# Patient Record
Sex: MALE | Race: WHITE | NOT HISPANIC OR LATINO | Employment: FULL TIME | URBAN - METROPOLITAN AREA
[De-identification: names, ages, dates, MRNs, and addresses within clinical notes are randomized per-mention and may not be internally consistent; named-entity substitution may affect disease eponyms.]

---

## 2023-08-10 ENCOUNTER — HOSPITAL ENCOUNTER (INPATIENT)
Facility: CLINIC | Age: 43
LOS: 14 days | Discharge: NURSING, REHAB OR LONG-TERM CARE | End: 2023-08-24
Attending: STUDENT IN AN ORGANIZED HEALTH CARE EDUCATION/TRAINING PROGRAM | Admitting: STUDENT IN AN ORGANIZED HEALTH CARE EDUCATION/TRAINING PROGRAM
Payer: COMMERCIAL

## 2023-08-10 DIAGNOSIS — K21.9 GASTROESOPHAGEAL REFLUX DISEASE WITHOUT ESOPHAGITIS: ICD-10-CM

## 2023-08-10 DIAGNOSIS — F51.01 PRIMARY INSOMNIA: ICD-10-CM

## 2023-08-10 DIAGNOSIS — S72.002A LEFT DISPLACED FEMORAL NECK FRACTURE (H): ICD-10-CM

## 2023-08-10 DIAGNOSIS — C40.20 OSTEOSARCOMA OF TIBIA, UNSPECIFIED LATERALITY (H): Primary | ICD-10-CM

## 2023-08-10 LAB
ABO/RH(D): NORMAL
ANION GAP SERPL CALCULATED.3IONS-SCNC: 9 MMOL/L (ref 7–15)
ANTIBODY SCREEN: NEGATIVE
BUN SERPL-MCNC: 21.3 MG/DL (ref 6–20)
CALCIUM SERPL-MCNC: 9.5 MG/DL (ref 8.6–10)
CHLORIDE SERPL-SCNC: 99 MMOL/L (ref 98–107)
CREAT SERPL-MCNC: 1.05 MG/DL (ref 0.67–1.17)
DEPRECATED HCO3 PLAS-SCNC: 30 MMOL/L (ref 22–29)
ERYTHROCYTE [DISTWIDTH] IN BLOOD BY AUTOMATED COUNT: 13.7 % (ref 10–15)
GFR SERPL CREATININE-BSD FRML MDRD: 89 ML/MIN/1.73M2
GLUCOSE SERPL-MCNC: 93 MG/DL (ref 70–99)
HCT VFR BLD AUTO: 43.9 % (ref 40–53)
HGB BLD-MCNC: 14.6 G/DL (ref 13.3–17.7)
INR PPP: 1.1 (ref 0.85–1.15)
MCH RBC QN AUTO: 28.6 PG (ref 26.5–33)
MCHC RBC AUTO-ENTMCNC: 33.3 G/DL (ref 31.5–36.5)
MCV RBC AUTO: 86 FL (ref 78–100)
PLATELET # BLD AUTO: 212 10E3/UL (ref 150–450)
POTASSIUM SERPL-SCNC: 4.1 MMOL/L (ref 3.4–5.3)
RBC # BLD AUTO: 5.1 10E6/UL (ref 4.4–5.9)
SODIUM SERPL-SCNC: 138 MMOL/L (ref 136–145)
SPECIMEN EXPIRATION DATE: NORMAL
WBC # BLD AUTO: 11.1 10E3/UL (ref 4–11)

## 2023-08-10 PROCEDURE — 36415 COLL VENOUS BLD VENIPUNCTURE: CPT | Performed by: STUDENT IN AN ORGANIZED HEALTH CARE EDUCATION/TRAINING PROGRAM

## 2023-08-10 PROCEDURE — 86850 RBC ANTIBODY SCREEN: CPT | Performed by: STUDENT IN AN ORGANIZED HEALTH CARE EDUCATION/TRAINING PROGRAM

## 2023-08-10 PROCEDURE — 120N000002 HC R&B MED SURG/OB UMMC

## 2023-08-10 PROCEDURE — 250N000011 HC RX IP 250 OP 636: Performed by: STUDENT IN AN ORGANIZED HEALTH CARE EDUCATION/TRAINING PROGRAM

## 2023-08-10 PROCEDURE — 82310 ASSAY OF CALCIUM: CPT | Performed by: STUDENT IN AN ORGANIZED HEALTH CARE EDUCATION/TRAINING PROGRAM

## 2023-08-10 PROCEDURE — 99254 IP/OBS CNSLTJ NEW/EST MOD 60: CPT | Performed by: PHYSICIAN ASSISTANT

## 2023-08-10 PROCEDURE — 85027 COMPLETE CBC AUTOMATED: CPT | Performed by: STUDENT IN AN ORGANIZED HEALTH CARE EDUCATION/TRAINING PROGRAM

## 2023-08-10 PROCEDURE — 85610 PROTHROMBIN TIME: CPT | Performed by: STUDENT IN AN ORGANIZED HEALTH CARE EDUCATION/TRAINING PROGRAM

## 2023-08-10 PROCEDURE — 250N000013 HC RX MED GY IP 250 OP 250 PS 637: Performed by: STUDENT IN AN ORGANIZED HEALTH CARE EDUCATION/TRAINING PROGRAM

## 2023-08-10 PROCEDURE — 86901 BLOOD TYPING SEROLOGIC RH(D): CPT | Performed by: STUDENT IN AN ORGANIZED HEALTH CARE EDUCATION/TRAINING PROGRAM

## 2023-08-10 RX ORDER — FENTANYL 50 UG/1
50 PATCH TRANSDERMAL
Status: DISCONTINUED | OUTPATIENT
Start: 2023-08-09 | End: 2023-08-24 | Stop reason: HOSPADM

## 2023-08-10 RX ORDER — PANTOPRAZOLE SODIUM 40 MG/1
40 TABLET, DELAYED RELEASE ORAL DAILY
Status: DISCONTINUED | OUTPATIENT
Start: 2023-08-11 | End: 2023-08-24 | Stop reason: HOSPADM

## 2023-08-10 RX ORDER — ACETAMINOPHEN 325 MG/1
650 TABLET ORAL EVERY 4 HOURS PRN
Status: DISCONTINUED | OUTPATIENT
Start: 2023-08-13 | End: 2023-08-24 | Stop reason: HOSPADM

## 2023-08-10 RX ORDER — LORATADINE 10 MG/1
10 TABLET ORAL DAILY PRN
COMMUNITY

## 2023-08-10 RX ORDER — ONDANSETRON 2 MG/ML
4 INJECTION INTRAMUSCULAR; INTRAVENOUS EVERY 6 HOURS PRN
Status: DISCONTINUED | OUTPATIENT
Start: 2023-08-10 | End: 2023-08-24 | Stop reason: HOSPADM

## 2023-08-10 RX ORDER — METHOCARBAMOL 750 MG/1
750 TABLET, FILM COATED ORAL EVERY 6 HOURS PRN
Status: DISCONTINUED | OUTPATIENT
Start: 2023-08-10 | End: 2023-08-24 | Stop reason: HOSPADM

## 2023-08-10 RX ORDER — OXYCODONE AND ACETAMINOPHEN 10; 325 MG/1; MG/1
1 TABLET ORAL EVERY 4 HOURS PRN
Status: ON HOLD | COMMUNITY
End: 2023-08-24

## 2023-08-10 RX ORDER — FENTANYL 50 UG/1
1 PATCH TRANSDERMAL
Status: ON HOLD | COMMUNITY
End: 2023-08-24

## 2023-08-10 RX ORDER — NALOXONE HYDROCHLORIDE 0.4 MG/ML
0.4 INJECTION, SOLUTION INTRAMUSCULAR; INTRAVENOUS; SUBCUTANEOUS
Status: DISCONTINUED | OUTPATIENT
Start: 2023-08-10 | End: 2023-08-24 | Stop reason: HOSPADM

## 2023-08-10 RX ORDER — NALOXONE HYDROCHLORIDE 0.4 MG/ML
0.2 INJECTION, SOLUTION INTRAMUSCULAR; INTRAVENOUS; SUBCUTANEOUS
Status: DISCONTINUED | OUTPATIENT
Start: 2023-08-10 | End: 2023-08-24 | Stop reason: HOSPADM

## 2023-08-10 RX ORDER — DEXAMETHASONE 4 MG/1
8 TABLET ORAL
Status: ON HOLD | COMMUNITY
End: 2023-08-24

## 2023-08-10 RX ORDER — LIDOCAINE 40 MG/G
CREAM TOPICAL
Status: DISCONTINUED | OUTPATIENT
Start: 2023-08-10 | End: 2023-08-24 | Stop reason: HOSPADM

## 2023-08-10 RX ORDER — AMOXICILLIN 250 MG
1 CAPSULE ORAL 2 TIMES DAILY
Status: DISCONTINUED | OUTPATIENT
Start: 2023-08-10 | End: 2023-08-24 | Stop reason: HOSPADM

## 2023-08-10 RX ORDER — OXYCODONE HYDROCHLORIDE 10 MG/1
10 TABLET ORAL EVERY 4 HOURS PRN
Status: DISCONTINUED | OUTPATIENT
Start: 2023-08-10 | End: 2023-08-24 | Stop reason: HOSPADM

## 2023-08-10 RX ORDER — ONDANSETRON 4 MG/1
4 TABLET, ORALLY DISINTEGRATING ORAL EVERY 6 HOURS PRN
Status: DISCONTINUED | OUTPATIENT
Start: 2023-08-10 | End: 2023-08-24 | Stop reason: HOSPADM

## 2023-08-10 RX ORDER — PROCHLORPERAZINE MALEATE 5 MG
10 TABLET ORAL EVERY 6 HOURS PRN
Status: DISCONTINUED | OUTPATIENT
Start: 2023-08-10 | End: 2023-08-24 | Stop reason: HOSPADM

## 2023-08-10 RX ORDER — POLYETHYLENE GLYCOL 3350 17 G/17G
17 POWDER, FOR SOLUTION ORAL DAILY
Status: DISCONTINUED | OUTPATIENT
Start: 2023-08-11 | End: 2023-08-24 | Stop reason: HOSPADM

## 2023-08-10 RX ORDER — BISACODYL 10 MG
10 SUPPOSITORY, RECTAL RECTAL DAILY PRN
Status: DISCONTINUED | OUTPATIENT
Start: 2023-08-10 | End: 2023-08-24 | Stop reason: HOSPADM

## 2023-08-10 RX ORDER — CALCIUM CARBONATE 500 MG/1
500 TABLET, CHEWABLE ORAL 4 TIMES DAILY PRN
Status: DISCONTINUED | OUTPATIENT
Start: 2023-08-10 | End: 2023-08-24 | Stop reason: HOSPADM

## 2023-08-10 RX ORDER — ACETAMINOPHEN 325 MG/1
325-650 TABLET ORAL EVERY 6 HOURS PRN
Status: ON HOLD | COMMUNITY
End: 2023-08-24

## 2023-08-10 RX ORDER — ACETAMINOPHEN 325 MG/1
975 TABLET ORAL EVERY 8 HOURS
Status: COMPLETED | OUTPATIENT
Start: 2023-08-10 | End: 2023-08-13

## 2023-08-10 RX ORDER — HYDROMORPHONE HYDROCHLORIDE 1 MG/ML
0.5 INJECTION, SOLUTION INTRAMUSCULAR; INTRAVENOUS; SUBCUTANEOUS
Status: DISCONTINUED | OUTPATIENT
Start: 2023-08-10 | End: 2023-08-16

## 2023-08-10 RX ORDER — HYDROMORPHONE HCL IN WATER/PF 6 MG/30 ML
0.2 PATIENT CONTROLLED ANALGESIA SYRINGE INTRAVENOUS
Status: DISCONTINUED | OUTPATIENT
Start: 2023-08-10 | End: 2023-08-16

## 2023-08-10 RX ADMIN — ACETAMINOPHEN 975 MG: 325 TABLET, FILM COATED ORAL at 20:01

## 2023-08-10 RX ADMIN — HYDROMORPHONE HYDROCHLORIDE 0.5 MG: 1 INJECTION, SOLUTION INTRAMUSCULAR; INTRAVENOUS; SUBCUTANEOUS at 20:00

## 2023-08-10 RX ADMIN — SENNOSIDES AND DOCUSATE SODIUM 1 TABLET: 8.6; 5 TABLET ORAL at 20:14

## 2023-08-10 RX ADMIN — METHOCARBAMOL 750 MG: 750 TABLET ORAL at 20:02

## 2023-08-10 ASSESSMENT — ACTIVITIES OF DAILY LIVING (ADL)
ADLS_ACUITY_SCORE: 35

## 2023-08-10 NOTE — PROGRESS NOTES
Transfer Type: [facility]  Transfer Triage Note    Patient is being admitted to [facility] under Ortho Primary     Date of call: 08/09/23  Time of call: 8:46 PM    Current Patient Location:  [facility] ED  Current Level of Care: Med Surg    Vitals: afebrile and HDS  Diagnosis: Pathologic Femoral Neck fracture  Is COVID-19 a concern? No  Reason for requested transfer: Procedure can be done here and not at referring hospital   Isolation Needs: None    Outside Records: Not available  Additional records may be faxed to [phone].    Transfer accepted: Yes  Stability of Patient: Patient is vitally stable, with no critical labs, and will likely remain stable throughout the transfer process  Level of Care Needed: Med Surg  Telemetry Needed:  None  Expected Time of Arrival for Transfer: 8-24 hours  Arrival Location:  [facility]    Recommendations for Management and Stabilization: Not needed    Additional Comments: Patient with rectal cancer, brain cancer who is in ED for pathologic framoralp neck fracture. Ortho would like the patient to come to [facility] [place] [place] under their care.     Shubham Mclaughlin MD

## 2023-08-10 NOTE — PHARMACY-ADMISSION MEDICATION HISTORY
Pharmacist Admission Medication History    Admission medication history is complete. The information provided in this note is only as accurate as the sources available at the time of the update.    Medication reconciliation/reorder completed by provider prior to medication history? No    Information Source(s): Patient and CareEverywhere/SureScripts via in-person    Pertinent Information:   Spoke with patient and utilized chart review to update patient's medication list.   Fentanyl patch - prescribed 50 mcg transdermal patch every 72 hours - patient states that he placed one on yesterday (is currently wearing).  Tramadol - patient did not mention this when asked if he takes any additional prescription medications to the medications listed below, however, appears there was a recent fill for this. Upon further chart review, patient indicates that tramadol was not working for pain management. Did not add this to prior to admission medication list.    Changes made to PTA medication list:  Added: All medications below were added   Deleted: None  Changed: None    Allergies reviewed with patient and updates made in EHR: no    Medication History Completed By: Taras Rivas RPH 8/10/2023 4:27 PM    Prior to Admission medications    Medication Sig Last Dose Taking? Auth Provider Long Term End Date   acetaminophen (TYLENOL) 325 MG tablet Take 325-650 mg by mouth every 6 hours as needed for mild pain PRN at PRN Yes Unknown, Entered By History     dexAMETHasone (DECADRON) 4 MG tablet Take 8 mg by mouth daily (with breakfast) 8/9/2023 at 0800 Yes Unknown, Entered By History Yes    fentaNYL (DURAGESIC) 50 mcg/hr 72 hr patch Place 1 patch onto the skin every 72 hours remove old patch. 8/9/2023 at 0800 Yes Unknown, Entered By History     loratadine (CLARITIN) 10 MG tablet Take 10 mg by mouth daily as needed for allergies PRN at PRN Yes Unknown, Entered By History     omeprazole (PRILOSEC) 20 MG DR capsule Take 20 mg by mouth daily  8/9/2023 at 0800 Yes Unknown, Entered By History     oxyCODONE-acetaminophen (PERCOCET)  MG per tablet Take 1 tablet by mouth every 4 hours as needed for severe pain 8/9/2023 at 2000 Yes Unknown, Entered By History

## 2023-08-10 NOTE — PROVIDER NOTIFICATION
Also paged orthopedics, [place], regarding lack of orders at this time. Pt requesting stool softeners and pain medications.

## 2023-08-10 NOTE — CONSULTS
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  He was accepted for transfer by Dr. Harley last night from [place].    He notes that he is had a left sided hip pain starting from April.  He was initially seen by a general orthopedist and diagnosed with trochanteric bursitis, prescribed steroids.  When this did not improve, he was referred for an MRI of the left hip that showed very likely metastasis.  He was referred to orthopedic oncologist Dr. Sr at [place], has received that CT and subsequent needle biopsy of the left ilium.  They recommended that he maintain nonweightbearing left lower extremity.    His treatment team includes:  Medical oncologist (Kee Palma)  Orthopedic oncologist (Nikhil Sr), [facility] orthopedics provider (Ludin Ibarra PA)    His vitals are stable, on exam he is neurovascular intact distally in the bilateral lower extremities.  There is no obvious bruising over his left hip area.  He notes some tenderness on the lateral aspect that feels deep.    Overall, this is a complicated fracture pattern with underlying tumor. We are in discussions with our treatment team to find the best surgical plan for this individual case.     RECOMMENDATIONS:   -  Admit to: Ortho, appreciate medical comanagement  - Plan for OR: Possibly tomorrow 8/11/2023, TBD   -Consent: TBD   -Pre-op labs: CBC, BMP, INR, type and screen   -Medicine clearance: Appreciated  - Anticoagulation/DVT Prophylaxis: Not currently taking, hold for OR  - Antibiotics/Tetanus: Ancef perioperatively  - X-rays/Imaging: Tried to have all images pushed from [facility], only received CT scan, plain films pending  - Bracing/Splinting: None  - Elevation: None  - Activity: Bedbound  - Weight bearing: Nonweightbearing left lower extremity  - Pain control: Use all oral meds first then escalate to IV, as ordered  - Diet: NPO at midnight for OR possible OR on 8/11/2023  - Cultures: None  - Consults Appreciated: Medicine  - Disposition: TBD  - Follow-up: TBD    HISTORY OF PRESENT ILLNESS:   Sarbjit Swain is a 43 year old male with PMH significant for medulloblastoma at age 4 (treated here at [facility]), BRCA1 genotype, rectal cancer diagnosed around 2 years ago (treated with hemicolectomy and Capecitabine chemotherapy, which was discontinued due to a chest tightness reaction) and new lesion 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  He was accepted for transfer by Dr. Harley last night from [place].    He notes that he is had a left sided hip pain starting from April.  He first went to a chiropractor, and when that did not provide much relief, he was seen by a general orthopedist PA and diagnosed with trochanteric bursitis, prescribed steroids.  When this did not improve, he was referred for an MRI of the left hip that showed very likely metastasis.  He was referred to orthopedic oncologist Dr. Sr at [place], has received that CT and subsequent needle biopsy of the left ilium.  They recommended that he maintain nonweightbearing left lower  extremity.    Given that he lives in a split-level home, this was difficult for him to maintain.  He had used a wheelchair that his wife ordered online.  However in order to get around, he was trying to crawl around on the ground when this pathologic fracture occurred.    He is under the impression that he needs to have his hip replaced, and is understanding that we need to take a team approach to find the best treatment approach for his specific condition. Pain is controlled.    Denies numbness, tingling, or weakness to the affected extremities.  Denies fevers, chills, nausea, vomiting, diarrhea, constipation, chest pain, shortness of breath.    PAST MEDICAL HISTORY:  No past medical history on file.  [Patient denies any personal history of bleeding disorders, clotting disorders, or adverse reactions to anesthesia].    PAST SURGICAL HISTORY:   No past surgical history on file.    MEDICATIONS:   Prior to Admission medications    Medication Sig Last Dose Taking? Auth Provider Long Term End Date   acetaminophen (TYLENOL) 325 MG tablet Take 325-650 mg by mouth every 6 hours as needed for mild pain PRN at PRN Yes Unknown, Entered By History     dexAMETHasone (DECADRON) 4 MG tablet Take 8 mg by mouth daily (with breakfast) 8/9/2023 at 0800 Yes Unknown, Entered By History Yes    fentaNYL (DURAGESIC) 50 mcg/hr 72 hr patch Place 1 patch onto the skin every 72 hours remove old patch. 8/9/2023 at 0800 Yes Unknown, Entered By History     loratadine (CLARITIN) 10 MG tablet Take 10 mg by mouth daily as needed for allergies PRN at PRN Yes Unknown, Entered By History     omeprazole (PRILOSEC) 20 MG DR capsule Take 20 mg by mouth daily 8/9/2023 at 0800 Yes Unknown, Entered By History     oxyCODONE-acetaminophen (PERCOCET)  MG per tablet Take 1 tablet by mouth every 4 hours as needed for severe pain 8/9/2023 at 2000 Yes Unknown, Entered By History       ALLERGIES:   Believes he has an allergy to Capecitabine where he felt his  whole chest tighten  Patient has no known allergies.    SOCIAL HISTORY:   Social History     Socioeconomic History    Marital status: Not on file     Spouse name: Not on file    Number of children: Not on file    Years of education: Not on file    Highest education level: Not on file   Occupational History    Not on file   Tobacco Use    Smoking status: Not on file    Smokeless tobacco: Not on file   Substance and Sexual Activity    Alcohol use: Not on file    Drug use: Not on file    Sexual activity: Not on file   Other Topics Concern    Not on file   Social History Narrative    Not on file     Social Determinants of Health     Financial Resource Strain: Not on file   Food Insecurity: Not on file   Transportation Needs: Not on file   Physical Activity: Not on file   Stress: Not on file   Social Connections: Not on file   Intimate Partner Violence: Not on file   Housing Stability: Not on file     Living situation: Patient lives in a 3 level split house in [place], [place], which is near [place].  Occupation: Unable to work currently, was daytime  at local school  Tobacco: Never smoker  Alcohol: Rare  Illicit Drugs: None    FAMILY HISTORY:  No family history on file.    Patient denies known family history of bleeding, clotting, or anesthesia related complications.     REVIEW OF SYSTEMS:   Otherwise a 10-point reviews of systems was negative except as noted above in the HPI.     PHYSICAL EXAM:   Vitals:    08/10/23 1536   BP: 120/76   BP Location: Left arm   Pulse: 103   Resp: 16   Temp: 98.1  F (36.7  C)   TempSrc: Oral   SpO2: 95%     General: Awake, alert, appropriate, following commands, NAD.  Lungs: Breathing comfortably and nonlabored, no wheezes or stridor noted.  Nasal cannula is in place when examined  Heart/Cardiovascular: Regular pulse, no peripheral cyanosis.  Right Lower Extremity: No deformity, skin intact. No significant tenderness to palpation over thigh, knee, leg, ankle/foot. No pain with  "ROM hip/knee/ankle. Motor intact distally TA/GSC/EHL/FHL with 5/5 strength. SILT sp/dp/tibial/saph/sural nerves. DP/PT pulses palpable, 2+, toes warm and well perfused.   Left Lower Extremity: No deformity, skin intact.  Tender to palpation over the lateral hip, patient pressed on himself to find the tender area.  No significant tenderness to palpation over knee, leg, ankle/foot. Motor intact distally TA/GSC/EHL/FHL with 5/5 strength. SILT sp/dp/tibial/saph/sural nerves. DP/PT pulses palpable, 2+, toes warm and well perfused.     LABS:  No lab results found in last 7 days.    Invalid input(s): SEDRATE  No results found for: INR    No results found for: CR  No results found for: GLC    No results found for: SED  No results found for: CRPI       7-Day Micro Results       No results found for the last 168 hours.           IMAGING:  Imaging reports are visible in epic for a PET CT scan, MRI left hip, MRI brain, CT needle biopsy, CT left hip.  Discussed with [facility] to send all records to [facility].  Unfortunately, only CT hip is viewable at this time.  We have reordered plain films here.  Will continue to work on obtaining full imaging during the day.    CT of the left hip independently reviewed showing subcapital femoral neck fracture with acetabular fracture and soft tissue prominence.  Recent Results (from the past 48 hour(s))   CT HIP LEFT WO IV CONTRAST    Narrative    This document is currently in Final Status    Exam Accession# [id number]    CT SCAN OF THE LEFT HIP 08/09/2023 1454    INDICATION: \"Fracture, hip\"    TECHNIQUE: MDCT with MPR.    FINDINGS: There is a pathologic subcapital fracture of the left femur with some anterior angulation at the fracture along with slight superior displacement of the lateral fracture fragment. Permeative destructive change involves the femoral head and neck and also involves the acetabular side of the joint especially the left lateral superior pubic ramus and medial " acetabulum. Soft tissue prominence is also seen in this area.    IMPRESSION: Pathologic subcapital fracture of the left femur likely due to metastatic disease. Acetabular involvement is also noted.     Dictated By: Eyal Kim MD 8/9/2023 3:26 PM  Edited By: LEDA 8/9/2023 3:32 PM    Electronically Signed: Eyal Kim MD 8/9/2023 5:01 PM     --  Avila Daniel MD, PhD  Orthopedic Surgery PGY-1  [phone]     I signed evaluated this patient on 8/14/2023 and reviewed his imaging showing large tumor involving the hip joint and causing pathologic femoral neck fracture.  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  I counseled with the patient and family about the best next test and talk with medical oncology service.  Plan will be to do a limited femoral neck fixation of the patient may need a hemipelvectomy or other major surgery to status local control of the pelvis tumor in addition to chemotherapy.  Hopefully this measurable for him pain control he undergoes neoadjuvant chemotherapy.    Dann Avila MD

## 2023-08-11 ENCOUNTER — APPOINTMENT (OUTPATIENT)
Dept: GENERAL RADIOLOGY | Facility: CLINIC | Age: 43
End: 2023-08-11
Attending: STUDENT IN AN ORGANIZED HEALTH CARE EDUCATION/TRAINING PROGRAM
Payer: COMMERCIAL

## 2023-08-11 DIAGNOSIS — M84.454S PATHOLOGICAL FRACTURE, PELVIS, SEQUELA: Primary | ICD-10-CM

## 2023-08-11 LAB
ALBUMIN SERPL BCG-MCNC: 3.6 G/DL (ref 3.5–5.2)
ALP SERPL-CCNC: 573 U/L (ref 40–129)
ALT SERPL W P-5'-P-CCNC: 18 U/L (ref 0–70)
ANION GAP SERPL CALCULATED.3IONS-SCNC: 11 MMOL/L (ref 7–15)
AST SERPL W P-5'-P-CCNC: 65 U/L (ref 0–45)
BILIRUB SERPL-MCNC: 1.5 MG/DL
BUN SERPL-MCNC: 19.5 MG/DL (ref 6–20)
CALCIUM SERPL-MCNC: 9.7 MG/DL (ref 8.6–10)
CHLORIDE SERPL-SCNC: 99 MMOL/L (ref 98–107)
CREAT SERPL-MCNC: 1 MG/DL (ref 0.67–1.17)
DEPRECATED HCO3 PLAS-SCNC: 25 MMOL/L (ref 22–29)
ERYTHROCYTE [DISTWIDTH] IN BLOOD BY AUTOMATED COUNT: 13.7 % (ref 10–15)
GFR SERPL CREATININE-BSD FRML MDRD: >90 ML/MIN/1.73M2
GLUCOSE SERPL-MCNC: 105 MG/DL (ref 70–99)
HCT VFR BLD AUTO: 42.6 % (ref 40–53)
HGB BLD-MCNC: 14.6 G/DL (ref 13.3–17.7)
MCH RBC QN AUTO: 29.4 PG (ref 26.5–33)
MCHC RBC AUTO-ENTMCNC: 34.3 G/DL (ref 31.5–36.5)
MCV RBC AUTO: 86 FL (ref 78–100)
PLATELET # BLD AUTO: 228 10E3/UL (ref 150–450)
POTASSIUM SERPL-SCNC: 4 MMOL/L (ref 3.4–5.3)
PROT SERPL-MCNC: 6.8 G/DL (ref 6.4–8.3)
RBC # BLD AUTO: 4.97 10E6/UL (ref 4.4–5.9)
SODIUM SERPL-SCNC: 135 MMOL/L (ref 136–145)
WBC # BLD AUTO: 14.1 10E3/UL (ref 4–11)

## 2023-08-11 PROCEDURE — 85027 COMPLETE CBC AUTOMATED: CPT | Performed by: PHYSICIAN ASSISTANT

## 2023-08-11 PROCEDURE — 250N000013 HC RX MED GY IP 250 OP 250 PS 637: Performed by: STUDENT IN AN ORGANIZED HEALTH CARE EDUCATION/TRAINING PROGRAM

## 2023-08-11 PROCEDURE — 80053 COMPREHEN METABOLIC PANEL: CPT | Performed by: PHYSICIAN ASSISTANT

## 2023-08-11 PROCEDURE — 250N000013 HC RX MED GY IP 250 OP 250 PS 637: Performed by: PHYSICIAN ASSISTANT

## 2023-08-11 PROCEDURE — 73502 X-RAY EXAM HIP UNI 2-3 VIEWS: CPT

## 2023-08-11 PROCEDURE — 250N000011 HC RX IP 250 OP 636: Performed by: STUDENT IN AN ORGANIZED HEALTH CARE EDUCATION/TRAINING PROGRAM

## 2023-08-11 PROCEDURE — 99232 SBSQ HOSP IP/OBS MODERATE 35: CPT | Performed by: INTERNAL MEDICINE

## 2023-08-11 PROCEDURE — 36415 COLL VENOUS BLD VENIPUNCTURE: CPT | Performed by: PHYSICIAN ASSISTANT

## 2023-08-11 PROCEDURE — 120N000002 HC R&B MED SURG/OB UMMC

## 2023-08-11 PROCEDURE — 250N000011 HC RX IP 250 OP 636: Mod: JZ

## 2023-08-11 RX ORDER — ENOXAPARIN SODIUM 100 MG/ML
40 INJECTION SUBCUTANEOUS EVERY 24 HOURS
Status: DISCONTINUED | OUTPATIENT
Start: 2023-08-11 | End: 2023-08-24 | Stop reason: HOSPADM

## 2023-08-11 RX ADMIN — MAGNESIUM HYDROXIDE 30 ML: 400 SUSPENSION ORAL at 12:55

## 2023-08-11 RX ADMIN — METHOCARBAMOL 750 MG: 750 TABLET ORAL at 12:16

## 2023-08-11 RX ADMIN — PANTOPRAZOLE SODIUM 40 MG: 40 TABLET, DELAYED RELEASE ORAL at 08:45

## 2023-08-11 RX ADMIN — ACETAMINOPHEN 975 MG: 325 TABLET, FILM COATED ORAL at 04:09

## 2023-08-11 RX ADMIN — HYDROMORPHONE HYDROCHLORIDE 0.5 MG: 1 INJECTION, SOLUTION INTRAMUSCULAR; INTRAVENOUS; SUBCUTANEOUS at 19:52

## 2023-08-11 RX ADMIN — ENOXAPARIN SODIUM 40 MG: 40 INJECTION SUBCUTANEOUS at 14:02

## 2023-08-11 RX ADMIN — HYDROMORPHONE HYDROCHLORIDE 0.5 MG: 1 INJECTION, SOLUTION INTRAMUSCULAR; INTRAVENOUS; SUBCUTANEOUS at 17:07

## 2023-08-11 RX ADMIN — ACETAMINOPHEN 975 MG: 325 TABLET, FILM COATED ORAL at 12:16

## 2023-08-11 RX ADMIN — SENNOSIDES AND DOCUSATE SODIUM 1 TABLET: 8.6; 5 TABLET ORAL at 08:45

## 2023-08-11 RX ADMIN — OXYCODONE HYDROCHLORIDE 15 MG: 10 TABLET ORAL at 01:01

## 2023-08-11 RX ADMIN — OXYCODONE HYDROCHLORIDE 15 MG: 10 TABLET ORAL at 22:17

## 2023-08-11 RX ADMIN — BISACODYL 10 MG: 10 SUPPOSITORY RECTAL at 17:29

## 2023-08-11 RX ADMIN — SENNOSIDES AND DOCUSATE SODIUM 1 TABLET: 8.6; 5 TABLET ORAL at 19:52

## 2023-08-11 RX ADMIN — ACETAMINOPHEN 975 MG: 325 TABLET, FILM COATED ORAL at 19:52

## 2023-08-11 RX ADMIN — OXYCODONE HYDROCHLORIDE 15 MG: 10 TABLET ORAL at 17:07

## 2023-08-11 RX ADMIN — OXYCODONE HYDROCHLORIDE 15 MG: 10 TABLET ORAL at 12:55

## 2023-08-11 RX ADMIN — HYDROMORPHONE HYDROCHLORIDE 0.5 MG: 1 INJECTION, SOLUTION INTRAMUSCULAR; INTRAVENOUS; SUBCUTANEOUS at 14:48

## 2023-08-11 RX ADMIN — OXYCODONE HYDROCHLORIDE 15 MG: 10 TABLET ORAL at 08:45

## 2023-08-11 RX ADMIN — HYDROMORPHONE HYDROCHLORIDE 0.5 MG: 1 INJECTION, SOLUTION INTRAMUSCULAR; INTRAVENOUS; SUBCUTANEOUS at 09:36

## 2023-08-11 ASSESSMENT — ACTIVITIES OF DAILY LIVING (ADL)
ADLS_ACUITY_SCORE: 35
ADLS_ACUITY_SCORE: 43
ADLS_ACUITY_SCORE: 35
ADLS_ACUITY_SCORE: 43
ADLS_ACUITY_SCORE: 35

## 2023-08-11 NOTE — PROGRESS NOTES
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    Interval Events:  -NAEON, AFVSS  -Still noting pain, but better than last night, please give PRNs    Subjective:  Pain 7/10 but better than last night. Denies any new numbness or tingling. IV/Oral meds. Has been in bed.    Objective:  Temp: 98.5  F (36.9  C) Temp src: Oral BP: 112/66 Pulse: 114   Resp: 16 SpO2: 95 % O2 Device: None (Room air)      Exam:  Gen: No acute distress, resting comfortably in bed. Alert and oriented, responds to questions appropriately.  Resp: Non-labored on RA.    MSK:  Left Lower Extremity: No deformity, skin intact. Compartments soft. No significant tenderness to palpation over knee, leg, ankle/foot. Motor intact distally TA/GSC/EHL/FHL with 5/5 strength. SILT sp/dp/tibial/saph/sural nerves. DP/PT pulses palpable, 2+, toes warm and well perfused.     Recent Labs   Lab 08/11/23  0702 08/10/23  1903   WBC 14.1* 11.1*   HGB 14.6 14.6    212     No results found for: SED  No results found for: CRPI   7-Day Micro Results       No results found for the last 168 hours.          Recent Results (from the past 24 hour(s))   XR Hip Left 2-3 Views    Narrative    EXAM: XR HIP LEFT 2-3 VIEWS  LOCATION: [facility]  DATE: 8/11/2023    INDICATION: Fall, femoral neck fracture on CT scan.  COMPARISON: CT pelvis 08/09/2023 and MRI left hip 06/30/2023.      Impression    IMPRESSION: Redemonstration of a  mildly displaced left transcervical femoral neck fracture with proximal displacement of the distal femur. Ill-defined lucency involving the medial acetabular wall and the acetabular roof with obliteration of the   iliopectineal line, the pubic root, and the superior pubic ramus. This corresponds with an expansile lytic soft tissue process within the acetabulum and pubis on the prior CT, and the confluent T1 hypointense marrow replacing process demonstrated on the   prior MRI of 06/30/2023.     Mild degenerative arthritis both hips. Radiopaque suture material overlies the low pelvis.      Assessment: Sarbjit Swain is a 43 year old male with PMH significant for medulloblastoma at age 4 (treated here at [facility]), BRCA1 genotype, rectal cancer diagnosed around 2 years ago with known metastatic lesion who sustained a left pathologic femoral neck fracture and acetabulum fracture on 8/9/2023 while he was crawling at his home. Pending OR with Dr. Chand next week. Given that medulloblastoma is not expected to metastasize and that rectal cancer is not necessarily spindle celled, we will need a pathology review prior to OR.    Plan:  - Plan for OR: With Dr. Chand next week. Requires pathology review of biopsy prior to surgery.              -Consent: TBD              -Pre-op labs: CBC, BMP, INR, type and screen              -Medicine clearance: Appreciated  - Anticoagulation/DVT Prophylaxis: Lovenox q24hrs, hold for OR  - Antibiotics/Tetanus: Ancef perioperatively  - X-rays/Imaging: CT, X-rays, complete  - Bracing/Splinting: None  - Elevation: None  - Activity: Bedbound  - Weight bearing: Nonweightbearing left lower extremity  - Pain control: Use all oral meds first then escalate to IV, as ordered  - Diet: Regular diet over weekend  - Cultures: None  - Consults Appreciated: Medicine, Pathology  - Disposition: Pending OR  - Follow-up: TBD    Orthopedic surgery staff for this patient is   [place].    --  Avila Daniel MD, PhD  Orthopedic Surgery PGY-1  [phone]    Please page me directly with any questions/concerns during regular weekday hours before 5pm. If there is no response, if it is a weekend, or if it is during evening hours, then please page the orthopedic surgery resident on call.    Future Appointments   Date Time Provider Department [place]   8/11/2023  7:00 PM UR PT WAITLIST [facility] [place]   8/11/2023  7:00 PM Yoly Navas, OTR [facility] [place]   8/12/2023  8:15 AM UR PT WAITLIST [facility] [place]

## 2023-08-11 NOTE — PLAN OF CARE
Goal Outcome Evaluation:         VS: Temp: 98.1  F (36.7  C) Temp src: Oral BP: 120/76 Pulse: 103   Resp: 16 SpO2: 95 % O2 Device: None (Room air)      O2: RA   Output: 200   Last BM: States a couple days ago. Requested stool softeners, provider notified.   Activity: Bedrest   Skin: Scattered bruising   Pain: Moderate to high, received dilaudid x2   Neuro: A&O x4   Dressing: NA   Diet: Regular, NPO at midnight   LDA: PIV L    Equipment:    Plan: Surgery likely tomorrow   Additional Info: Pt arrived at 1500. Fell, witnessed by spouse- see prior notes.

## 2023-08-11 NOTE — PLAN OF CARE
Goal Outcome Evaluation:      Plan of Care Reviewed With: patient    VS: VSS   O2: SpO2 > 90% and stable on RA. LS clear and equal bilaterally. Denies chest pain and SOB.    Output: Voids using beside urinal   Last BM: States a couple days ago, denies abdominal discomfort.     Activity: Bedrest   Skin: WDL except, scattered bruising    Pain: Pain managed with Oxycodone, scheduled tylenol, IV dilaudid available    CMS: AOx4. Denies numbness and tingling.   Dressing: None    Diet: NPO   LDA: L PIV SL    Equipment: IV pole, personal belongings,    Plan: Continue with plan of care. Call light within reach, pt able to make needs known.    Additional Info: L hip surgery likely today?

## 2023-08-11 NOTE — PROVIDER NOTIFICATION
Paged orthopedics regarding witnessed fall without injury. Pt was attempting to use urinal and sit on side of bed. Denies hitting head, additional pain.

## 2023-08-11 NOTE — PROGRESS NOTES
[facility]    Medicine Progress Note - Hospitalist Service, GOLD TEAM 19    Date of Admission:  8/10/2023    Assessment & Plan   Sarbjit Swain is a 43 year old male admitted on 8/10/2023. He has a past medical history significant for Greene's esophagus, BPH, medulloblastoma s/p  shunt, colorectal carcinoma. He has been following in [place] with Oncology with new finding of spindle cell tumor of left ilium and had been non-weightbearing on this extremity. Suffered pathologic fracture while crawling around his home. Internal Medicine consulted for medical co-management.      Left pathologic femoral neck fracture, acetabulum fracture  Spindle cell tumor of left ilium  ---   Recently diagnosed lesion in left ilium with biopsy showing spindle cell tumor.   ---   Suffered pathologic fracture on 8/9 while crawling around his home.   ---   Transferred from [place] for Orthopedics team consultation and likely procedure.   ---   From pre-operative risk standpoint, appears to be relatively low risk without underlying cardiac or pulmonary risk factors.   Perioperative Risk Assessment  Cardiovascular Risk. Surgery is not urgent/emergent and is not high risk. Patient does not have any active cardiac conditions. Functional capacity is >4 METs without symptoms. Patient without underlying cardiac risk factors.      RCRI:  3.9% risk of perioperative MI, pulmonary edema, VF, cardiac arrest, or complete AVB  Watson Risk Assessment:  0.1% of perioperative MI or cardiac arrest     Pulmonary Risk. does not have known underlying pulmonary disease. Risk for any post op pulmonary complications is low at 1.6% based on Canet Risk Index (age, pre op O2 sat, resp inf past month, pre op hgb < 10, surgical incision site, duration of surgery, emergency surgery).      Acute on chronic cancer related pain  ---   PTA on fentanyl patch plus oxycodone as needed.  ---   Continue fentanyl patch - would  remove prior to surgery  ---   Continue Tylenol, robaxin, oxycodone, IV dilaudid     Stage II colorectal cancer s/p hemicolectomy now s/p re-anastomosis   ---   Follows with Dr. Palma at , last seen 8/7. Dx in 7/2021.   ---   Underwent hemicolectomy 9/2021 followed by re-anastomosis in 12/2021.  ---   Follow up outpatient      Hx of medulloblastoma s/p  shunt   ---   At age 4.   ---   Underwent craniotomy with resection follow by radiation and chemotherapy.   ---   Has stable right sided brain masses c/w meningiomas being followed by Neurosurgery.      BRCA1 positive   ---   Being followed by Oncology for hx of cancer.      Hx of hypothyroidism  ---   Noted in chart. Per review, no abnormal TSH/T4 to fit with diagnosis.   ---   Not on pta medication        Diet: Regular Diet Adult    DVT Prophylaxis:  Enoxaparin  Bran Catheter: Not present  Lines: None     Cardiac Monitoring: None  Code Status: Full Code    Expected Discharge Date:  TBD                  Rebecca Killian MD  Hospitalist Service, GOLD TEAM [address] [place] [address]  Securely message with [employer] (more info)  Text page via [facility] Paging/Directory   See signed in provider for up to date coverage information  ______________________________________________________________________    Interval History   No complaints  Awaiting for ortho team input  Heading down for xray when seen  Wife at bedside    Physical Exam   Vital Signs: Temp: 98.5  F (36.9  C) Temp src: Oral BP: 112/66 Pulse: 114   Resp: 16 SpO2: 95 % O2 Device: None (Room air)    Weight: 175 lbs 0 oz  General Appearance: Awake. Alert and oriented x4. Laying in bed. Appears uncomfortable, no acute distress.  Eyes: Normal lids. Anicteric sclera. Pupils equal and round.  HEENT: Normocephalic. Nares patent. Mucous membranes moist.  Respiratory: Normal work of breathing on room air. Lungs CTAB. No wheezes.  Cardiovascular: RRR. S1, S2. No murmurs. Pedal  pulses 2+  GI: Non-distended abdomen. Normoactive. Soft.  Lymph/Hematologic: No abnormal or excessive bruising on exposed skin.  Skin: Warm, dry. No rashes on exposed skin.   Musculoskeletal: Moves upper extremities freely.  Neurologic: CN II-XII grossly intact.         Data     I have personally reviewed the following data over the past 24 hrs:    14.1 (H)  \   14.6   / 228     135 (L) 99 19.5 /  105 (H)   4.0 25 1.00 \     ALT: 18 AST: 65 (H) AP: 573 (H) TBILI: 1.5 (H)   ALB: 3.6 TOT PROTEIN: 6.8 LIPASE: N/A     INR:  1.10 PTT:  N/A   D-dimer:  N/A Fibrinogen:  N/A       Imaging results reviewed over the past 24 hrs:   Recent Results (from the past 24 hour(s))   XR Hip Left 2-3 Views    Narrative    EXAM: XR HIP LEFT 2-3 VIEWS  LOCATION: [facility]  DATE: 8/11/2023    INDICATION: Fall, femoral neck fracture on CT scan.  COMPARISON: CT pelvis 08/09/2023 and MRI left hip 06/30/2023.      Impression    IMPRESSION: Redemonstration of a mildly displaced left transcervical femoral neck fracture with proximal displacement of the distal femur. Ill-defined lucency involving the medial acetabular wall and the acetabular roof with obliteration of the   iliopectineal line, the pubic root, and the superior pubic ramus. This corresponds with an expansile lytic soft tissue process within the acetabulum and pubis on the prior CT, and the confluent T1 hypointense marrow replacing process demonstrated on the   prior MRI of 06/30/2023.     Mild degenerative arthritis both hips. Radiopaque suture material overlies the low pelvis.

## 2023-08-11 NOTE — PLAN OF CARE
VS: Temp: 98.1  F (36.7  C) Temp src: Oral BP: (!) 123/93 Pulse: (!) 128   Resp: 16 SpO2: 93 % O2 Device: None (Room air)       O2: Pt.'s O2 sats are above 90 on room air, denies shortness of breath, denies chest pain   Output: Voiding independently, adequate amounts in bedside urinal   Last BM: 8/11/2023   Activity: Strict bedrest, per order. Is able to reposition self and boost self up in bed.  pt. Has L hip fx    Skin: WDL except scattered bruising   Pain: Managed with IV dilaudid, oxy, tylenol   CMS: Intact, a&O x4, denies numbness and tingling   Dressing: none   Diet: Regular diet, tolerating well. Pt. Denies nausea/vomiting   LDA: L PIV SL, patent   Equipment: IV pole, personal belongings   Plan: Pain management, surgery next week on Monday or Tuesday, potentially Thursday   Additional Info:

## 2023-08-11 NOTE — PLAN OF CARE
Pt is A&Ox4. VSS. LS clear, on RA. BS active, LBM 8/8/2023. Voiding well. Pain managed with 15mg oxycodone. Possible surgery today. NPO since midnight. L PIV is patent and SL. Pt has not been oob. Continue to monitor.

## 2023-08-11 NOTE — PLAN OF CARE
Patient is alert and oriented and able to make needs and wants known, pain is managed with current interventions on hand  No definite surgery planned date as of yet, family is aware, Resident Ortho MD was able to speak with the patient and his wife today.   Pt states he is constipated and given Milk of Magnesia PRN, shortly assisted In using commode, unable to have a BM - offered Bisacodyl - refused at this time, agreed to take it at a later time   Has a Fentanyl Patch on Right Inner Arm due to be changed 8/12/2023 at 0800 AM  No further issues this shift

## 2023-08-11 NOTE — PROVIDER NOTIFICATION
"This fall was unwitnessed by staff but was witnessed by a visitor in the patient's room. Per the patient's visitor and the patient, the patient was trying to sit up and over closer to the edge of the bed to use the urinal. The patient then ended up falling forwards out of bed and landing on his hands and knees. Patient and visitor stated that the patient did not hit his head and he had no pain that was different from what he was experiencing before the fall. Staff found out about the fall when the visitor called and asked staff to help get the patient back into bed. The patient has orders for strict bedrest, and it is possible that the patient and visitor did not receive that education or have it explained well enough once the orders were placed by the provider. The patient and visitor also may not have been properly educated on call light use. Per report, the patient has been unable to walk at home for \"a while\" and gets around at home by crawling. Writer conducted a fall debrief with the patient's nurse, nursing assistance, and 2 RNs that came to assist getting the patient back into bed. Ortho resident on call (Bon Leach) was notified of patient's fall and given all information pertaining to the fall. Received a call back from the ortho resident that no orders are needed at this time, but to notify ortho if pt starts reporting new pain or develops any other new signs/symptoms.  "

## 2023-08-11 NOTE — CONSULTS
[facility]  Consult Note - Hospitalist Service, GOLD TEAM   Date of Admission:  8/10/2023  Consult Requested by: Dr. Harley  Reason for Consult: Pre-operative risk assessment     Assessment & Plan   Sarbjit Swain is a 43 year old male admitted on 8/10/2023. He has a past medical history significant for Greene's esophagus, BPH, medulloblastoma s/p  shunt, colorectal carcinoma. He has been following in [place] with Oncology with new finding of spindle cell tumor of left ilium and had been non-weightbearing on this extremity. Suffered pathologic fracture while crawling around his home. Internal Medicine consulted for medical co-management.     Left pathologic femoral neck fracture, acetabulum fracture  Spindle cell tumor of left ilium  Recently diagnosed lesion in left ilium with biopsy showing spindle cell tumor. Suffered pathologic fracture on 8/9 while crawling around his home. Transferred from [place] for Orthopedics team consultation and likely procedure. From pre-operative risk standpoint, appears to be relatively low risk without underlying cardiac or pulmonary risk factors.   - Further management per Orthopedics team    Perioperative Risk Assessment  Cardiovascular Risk. Surgery is not urgent/emergent and is not high risk. Patient does not have any active cardiac conditions. Functional capacity is >4 METs without symptoms. Patient without underlying cardiac risk factors.     RCRI:  3.9% risk of perioperative MI, pulmonary edema, VF, cardiac arrest, or complete AVB  Watson Risk Assessment:  0.1% of perioperative MI or cardiac arrest     Pulmonary Risk. does not have known underlying pulmonary disease. Risk for any post op pulmonary complications is low at 1.6% based on Canet Risk Index (age, pre op O2 sat, resp inf past month, pre op hgb < 10, surgical incision site, duration of surgery, emergency surgery).     Acute on chronic cancer related pain  PTA on  fentanyl patch plus oxycodone as needed. Struggling with pain control currently.  - Continue fentanyl patch - would remove prior to surgery  - Tylenol, robaxin, oxycodone, IV dilaudid    Stage II colorectal cancer s/p hemicolectomy now s/p re-anastomosis   Follows with Dr. Palma at [facility], last seen 8/7. Dx in 7/2021. Underwent hemicolectomy 9/2021 followed by re-anastomosis in 12/2021.  - Follow up outpatient     Hx of medulloblastoma s/p  shunt   At age 4. Underwent craniotomy with resection follow by radiation and chemotherapy. Has stable right sided brain masses c/w meningiomas being followed by Neurosurgery.     BRCA1 positive   Being followed by Oncology for hx of cancer.     Hx of hypothyroidism  Noted in chart. Per review, no abnormal TSH/T4 to fit with diagnosis. Not on pta medications.      The patient's care was discussed with the Patient, Patient's Family, and Primary team via this note .    Clinically Significant Risk Factors Present on Admission                                Marylou Rivas PA-C  Hospitalist Service, GOLD TEAM   Securely message with [employer] info)  Text page via [facility] Paging/Directory   See signed in provider for up to date coverage information  ______________________________________________________________________    Chief Complaint   Hip pain    History is obtained from the patient    History of Present Illness   Sarbjit Swain is a 43 year old male who is [place] [facility] [place] [facility] evaluation after he suffered a pathologic fracture while crawling around his home yesterday. Had been non-weight bearing on this leg due to findings concerning for a malignancy with biopsy showing spindle cell tumor. Had been started on dexamethasone in addition to fentanyl patch and oxycodone for pain. Still struggling with pain. Recently underwent pre-operative risk assessment with no changes in his history. Verified the following information:    Cardiovascular: does  not have underlying cardiac disease. At baseline, patient is able to perform daily activity without limitations due to chest pain or dyspnea. Currently, denies any chest pain or palpitations.      Pulmonary: does not have underlying lung disease. Currently, denies any cough or SOB.      Prior Anesthesia: Yes, without complications.   History of DVT/PE: No     VIKY: No     History of stroke - No, seizure - No, kidney disease - No, liver disease - No, thyroid disease - Yes, in chart but not on medication, will verify TSH level. diabetes - No    Past Medical History    No past medical history on file.    Past Surgical History   No past surgical history on file.    Medications   I have reviewed this patient's current medications       Review of Systems    The 10 point Review of Systems is negative other than noted in the HPI or here.      Physical Exam   Vital Signs: Temp: 98.1  F (36.7  C) Temp src: Oral BP: 120/76 Pulse: 103   Resp: 16 SpO2: 95 % O2 Device: None (Room air)    Weight: 0 lbs 0 oz    General Appearance: Awake. Alert and oriented x4. Laying in bed. Appears uncomfortable, no acute distress.  Eyes: Normal lids. Anicteric sclera. Pupils equal and round.  HEENT: Normocephalic. Nares patent. Mucous membranes moist.  Respiratory: Normal work of breathing on room air. Lungs CTAB. No wheezes.  Cardiovascular: RRR. S1, S2. No murmurs. Pedal pulses 2+  GI: Non-distended abdomen. Normoactive. Soft.  Lymph/Hematologic: No abnormal or excessive bruising on exposed skin.  Skin: Warm, dry. No rashes on exposed skin.   Musculoskeletal: Moves upper extremities freely.  Neurologic: CN II-XII grossly intact.     Medical Decision Making       65 MINUTES SPENT BY ME on the date of service doing chart review, history, exam, documentation & further activities per the note.      Data     I have personally reviewed the following data over the past 24 hrs:    11.1 (H)  \   14.6   / 212     138 99 21.3 (H) /  93   4.1 30 (H) 1.05 \      INR:  1.10 PTT:  N/A   D-dimer:  N/A Fibrinogen:  N/A       Imaging results reviewed over the past 24 hrs:   No results found for this or any previous visit (from the past 24 hour(s)).

## 2023-08-12 LAB
ERYTHROCYTE [DISTWIDTH] IN BLOOD BY AUTOMATED COUNT: 13.4 % (ref 10–15)
HCT VFR BLD AUTO: 43 % (ref 40–53)
HGB BLD-MCNC: 14.6 G/DL (ref 13.3–17.7)
MCH RBC QN AUTO: 29.3 PG (ref 26.5–33)
MCHC RBC AUTO-ENTMCNC: 34 G/DL (ref 31.5–36.5)
MCV RBC AUTO: 86 FL (ref 78–100)
PLATELET # BLD AUTO: 218 10E3/UL (ref 150–450)
RBC # BLD AUTO: 4.99 10E6/UL (ref 4.4–5.9)
WBC # BLD AUTO: 13.5 10E3/UL (ref 4–11)

## 2023-08-12 PROCEDURE — 99231 SBSQ HOSP IP/OBS SF/LOW 25: CPT | Performed by: INTERNAL MEDICINE

## 2023-08-12 PROCEDURE — 93010 ELECTROCARDIOGRAM REPORT: CPT | Performed by: INTERNAL MEDICINE

## 2023-08-12 PROCEDURE — 250N000013 HC RX MED GY IP 250 OP 250 PS 637: Performed by: STUDENT IN AN ORGANIZED HEALTH CARE EDUCATION/TRAINING PROGRAM

## 2023-08-12 PROCEDURE — 36415 COLL VENOUS BLD VENIPUNCTURE: CPT | Performed by: PHYSICIAN ASSISTANT

## 2023-08-12 PROCEDURE — 250N000011 HC RX IP 250 OP 636: Mod: JZ

## 2023-08-12 PROCEDURE — 85027 COMPLETE CBC AUTOMATED: CPT | Performed by: PHYSICIAN ASSISTANT

## 2023-08-12 PROCEDURE — 93005 ELECTROCARDIOGRAM TRACING: CPT

## 2023-08-12 PROCEDURE — 258N000003 HC RX IP 258 OP 636: Performed by: PHYSICIAN ASSISTANT

## 2023-08-12 PROCEDURE — 250N000011 HC RX IP 250 OP 636: Mod: JZ | Performed by: STUDENT IN AN ORGANIZED HEALTH CARE EDUCATION/TRAINING PROGRAM

## 2023-08-12 PROCEDURE — 250N000013 HC RX MED GY IP 250 OP 250 PS 637: Performed by: PHYSICIAN ASSISTANT

## 2023-08-12 PROCEDURE — 120N000002 HC R&B MED SURG/OB UMMC

## 2023-08-12 RX ADMIN — HYDROMORPHONE HYDROCHLORIDE 0.5 MG: 1 INJECTION, SOLUTION INTRAMUSCULAR; INTRAVENOUS; SUBCUTANEOUS at 21:47

## 2023-08-12 RX ADMIN — OXYCODONE HYDROCHLORIDE 15 MG: 10 TABLET ORAL at 08:29

## 2023-08-12 RX ADMIN — ACETAMINOPHEN 975 MG: 325 TABLET, FILM COATED ORAL at 19:51

## 2023-08-12 RX ADMIN — OXYCODONE HYDROCHLORIDE 15 MG: 10 TABLET ORAL at 20:11

## 2023-08-12 RX ADMIN — OXYCODONE HYDROCHLORIDE 15 MG: 10 TABLET ORAL at 04:36

## 2023-08-12 RX ADMIN — FENTANYL 1 PATCH: 50 PATCH TRANSDERMAL at 08:02

## 2023-08-12 RX ADMIN — HYDROMORPHONE HYDROCHLORIDE 0.2 MG: 0.2 INJECTION, SOLUTION INTRAMUSCULAR; INTRAVENOUS; SUBCUTANEOUS at 17:31

## 2023-08-12 RX ADMIN — SODIUM CHLORIDE, POTASSIUM CHLORIDE, SODIUM LACTATE AND CALCIUM CHLORIDE 1000 ML: 600; 310; 30; 20 INJECTION, SOLUTION INTRAVENOUS at 21:47

## 2023-08-12 RX ADMIN — PANTOPRAZOLE SODIUM 40 MG: 40 TABLET, DELAYED RELEASE ORAL at 08:02

## 2023-08-12 RX ADMIN — SENNOSIDES AND DOCUSATE SODIUM 1 TABLET: 8.6; 5 TABLET ORAL at 08:02

## 2023-08-12 RX ADMIN — OXYCODONE HYDROCHLORIDE 15 MG: 10 TABLET ORAL at 12:24

## 2023-08-12 RX ADMIN — ENOXAPARIN SODIUM 40 MG: 40 INJECTION SUBCUTANEOUS at 11:52

## 2023-08-12 RX ADMIN — ACETAMINOPHEN 975 MG: 325 TABLET, FILM COATED ORAL at 11:52

## 2023-08-12 RX ADMIN — OXYCODONE HYDROCHLORIDE 15 MG: 10 TABLET ORAL at 16:35

## 2023-08-12 RX ADMIN — SENNOSIDES AND DOCUSATE SODIUM 1 TABLET: 8.6; 5 TABLET ORAL at 19:51

## 2023-08-12 RX ADMIN — METHOCARBAMOL 750 MG: 750 TABLET ORAL at 11:52

## 2023-08-12 RX ADMIN — ACETAMINOPHEN 975 MG: 325 TABLET, FILM COATED ORAL at 04:36

## 2023-08-12 ASSESSMENT — ACTIVITIES OF DAILY LIVING (ADL)
ADLS_ACUITY_SCORE: 43

## 2023-08-12 NOTE — PROGRESS NOTES
Orthopedic Surgery Progress Note 08/12/2023    Sarbjit Swain is a 43 year old male with PMH significant for medulloblastoma at age 4 (treated here at [facility]), BRCA1 genotype, rectal cancer diagnosed around 2 years ago (treated with hemicolectomy and Capecitabine chemotherapy, which was discontinued due to an chest tightness reaction) and new lesion 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    Interval Events:  -NAEON, AFVSS  -Still noting pain, but better than last night, please give PRNs    Subjective:  Pain 7/10 but better than last night. Denies any new numbness or tingling. IV/Oral meds. Has been in bed.    Objective:  Temp: 98  F (36.7  C) Temp src: Oral BP: 101/62 Pulse: 102   Resp: 16 SpO2: 97 % O2 Device: None (Room air)      Exam:  Gen: No acute distress, resting comfortably in bed. Alert and oriented, responds to questions appropriately.  Resp: Non-labored on RA.    MSK:  Left Lower Extremity: SILT sp/dp/tibial/saph/sural nerves. DP/PT pulses palpable, 2+, toes warm and well perfused.     Recent Labs   Lab 08/12/23  0643 08/11/23  0702 08/10/23  1903   WBC 13.5* 14.1* 11.1*   HGB 14.6 14.6 14.6    228 212        Assessment: Sarbjit Swain is a 43 year old male with PMH significant for medulloblastoma at age 4 (treated here at [facility]), BRCA1 genotype, rectal cancer diagnosed around 2 years ago with known metastatic lesion who sustained a left pathologic femoral neck fracture and acetabulum fracture on 8/9/2023 while he was crawling at his home. Pending OR with Dr. Chand next week. Given that medulloblastoma is not expected to metastasize and that rectal cancer is not necessarily spindle celled, we will need a pathology review prior to OR.    Plan:  - Plan for OR: With Dr. Chand next week. Requires pathology  review of biopsy prior to surgery.              -Consent: TBD              -Pre-op labs: CBC, BMP, INR, type and screen              -Medicine clearance: Appreciated  - Anticoagulation/DVT Prophylaxis: Lovenox q24hrs, hold for OR once surgical timing determined   - Antibiotics/Tetanus: Ancef perioperatively  - X-rays/Imaging: CT, X-rays, complete  - Bracing/Splinting: None  - Elevation: None  - Activity: Bedbound  - Weight bearing: Nonweightbearing left lower extremity  - Pain control: Use all oral meds first then escalate to IV, as ordered  - Diet: Regular diet over weekend  - Cultures: None  - Consults Appreciated: Medicine, Pathology  - Disposition: Pending OR  - Follow-up: D    Orthopedic surgery staff for this patient is Dr. Avila.    --  Zhanna Alvarado MD     Future Appointments   Date Time Provider Department [place]   8/11/2023  7:00 PM [facility]   8/11/2023  7:00 PM Yoly Navas, OTR [facility] [place]   8/12/2023  8:15 AM [facility]

## 2023-08-12 NOTE — PLAN OF CARE
"  VS: BP (!) 147/82 (BP Location: Left arm, Patient Position: Supine)   Pulse 117   Temp 98.6  F (37  C) (Oral)   Resp 16   Ht 1.6 m (5' 3\")   Wt 79.4 kg (175 lb)   SpO2 93%   BMI 31.00 kg/m    -tachycardic, EKG ordered awaiting result   O2: Stable at room air    Output: Voids without difficulty using urinal    Last BM: 08/11/23   Activity: Strict Bedrest. Able to repositioned self in bed.    Skin: WDL with scattered bruising.   Pain: Managed by prn oxycodone and scheduled tylenol.   CMS: Intact. Denies numbness and tingling.   Dressing: none   Diet: Regular diet. Denies nausea and vomiting    LDA: PIV on left, saline locked.   Equipment: IV pole and personal belongings.   Plan: TBD   Additional Info: Call light within reach, able to make needs known.                         "

## 2023-08-12 NOTE — PROGRESS NOTES
[facility]    Medicine Progress Note - Hospitalist Service, GOLD TEAM 19    Date of Admission:  8/10/2023    Assessment & Plan   Sarbjit Swain is a 43 year old male admitted on 8/10/2023. He has a past medical history significant for Greene's esophagus, BPH, medulloblastoma s/p  shunt, colorectal carcinoma. He has been following in [place] with Oncology with new finding of spindle cell tumor of left ilium and had been non-weightbearing on this extremity. Suffered pathologic fracture while crawling around his home. Internal Medicine consulted for medical co-management.      Pathologic left femoral neck and acetabulum fracture  Spindle cell tumor of left ilium  ---   Recently diagnosed lesion in left ilium with biopsy showing spindle cell tumor.   ---   Suffered pathologic fracture on 8/9 while crawling around his home.   ---   Transferred from [place] for Orthopedics team consultation and likely procedure.   ---   From pre-operative risk standpoint, appears to be relatively low risk without underlying cardiac or pulmonary risk factors.   Perioperative Risk Assessment  Cardiovascular Risk. Surgery is not urgent/emergent and is not high risk. Patient does not have any active cardiac conditions. Functional capacity is >4 METs without symptoms. Patient without underlying cardiac risk factors.      RCRI:  3.9% risk of perioperative MI, pulmonary edema, VF, cardiac arrest, or complete AVB  Watson Risk Assessment:  0.1% of perioperative MI or cardiac arrest     Pulmonary Risk. does not have known underlying pulmonary disease. Risk for any post op pulmonary complications is low at 1.6% based on Canet Risk Index (age, pre op O2 sat, resp inf past month, pre op hgb < 10, surgical incision site, duration of surgery, emergency surgery).      Acute on chronic cancer related pain  ---   PTA on fentanyl patch plus oxycodone as needed.  ---   Continue fentanyl patch - would remove  prior to surgery  ---   Continue Tylenol, robaxin, oxycodone, IV dilaudid     Stage II colorectal cancer s/p hemicolectomy now s/p re-anastomosis   ---   Follows with Dr. Palma at [facility], last seen 8/7. Dx in 7/2021.   ---   Underwent hemicolectomy 9/2021 followed by re-anastomosis in 12/2021.  ---   Follow up outpatient      Hx of medulloblastoma s/p  shunt   ---   At age 4.   ---   Underwent craniotomy with resection follow by radiation and chemotherapy.   ---   Has stable right sided brain masses c/w meningiomas being followed by Neurosurgery.      BRCA1 positive   ---   Being followed by Oncology for hx of cancer.      Hx of hypothyroidism  ---   Noted in chart. Per review, no abnormal TSH/T4 to fit with diagnosis.   ---   Not on pta medication        Diet: Regular Diet Adult    DVT Prophylaxis:  Enoxaparin  Bran Catheter: Not present  Lines: None     Cardiac Monitoring: None  Code Status: Full Code    Expected Discharge Date:  TBD                  Rebecca Killian MD  Hospitalist Service, GOLD TEAM [address] [place] [address]  Securely message with [employer] (more info)  Text page via [facility] Paging/Directory   See signed in provider for up to date coverage information  ______________________________________________________________________    Interval History   No complaints  Awaiting to go to OR   Pain is well controlled    Physical Exam   Vital Signs: Temp: 98  F (36.7  C) Temp src: Oral BP: 101/62 Pulse: 102   Resp: 16 SpO2: 97 % O2 Device: None (Room air)    Weight: 175 lbs 0 oz  General Appearance: Awake. Alert and oriented x4. Laying in bed. Appears uncomfortable, no acute distress.  Eyes: Normal lids. Anicteric sclera. Pupils equal and round.  HEENT: Normocephalic. Nares patent. Mucous membranes moist.  Respiratory: Normal work of breathing on room air. Lungs CTAB. No wheezes.  Cardiovascular: RRR. S1, S2. No murmurs. Pedal pulses 2+  GI: Non-distended abdomen.  Normoactive. Soft.  Lymph/Hematologic: No abnormal or excessive bruising on exposed skin.  Skin: Warm, dry. No rashes on exposed skin.   Musculoskeletal: Moves upper extremities freely.  Neurologic: CN II-XII grossly intact.         Data     I have personally reviewed the following data over the past 24 hrs:    13.5 (H)  \   14.6   / 218     N/A N/A N/A /  N/A   N/A N/A N/A \       Imaging results reviewed over the past 24 hrs:   No results found for this or any previous visit (from the past 24 hour(s)).

## 2023-08-12 NOTE — PLAN OF CARE
Text paged send to hospitalist re: patient tachycardic. Asymptomatic, no further complain except for pain which is managed by prn pain medication, HR ranging from 112-133, irregular. HR increases with movement. Awaiting order from the doctor.    Update:   EKG has been ordered.

## 2023-08-13 LAB
ERYTHROCYTE [DISTWIDTH] IN BLOOD BY AUTOMATED COUNT: 13.4 % (ref 10–15)
HCT VFR BLD AUTO: 38.5 % (ref 40–53)
HGB BLD-MCNC: 13.1 G/DL (ref 13.3–17.7)
MCH RBC QN AUTO: 28.6 PG (ref 26.5–33)
MCHC RBC AUTO-ENTMCNC: 34 G/DL (ref 31.5–36.5)
MCV RBC AUTO: 84 FL (ref 78–100)
PLATELET # BLD AUTO: 198 10E3/UL (ref 150–450)
RBC # BLD AUTO: 4.58 10E6/UL (ref 4.4–5.9)
WBC # BLD AUTO: 10.3 10E3/UL (ref 4–11)

## 2023-08-13 PROCEDURE — 250N000011 HC RX IP 250 OP 636: Mod: JZ

## 2023-08-13 PROCEDURE — 85027 COMPLETE CBC AUTOMATED: CPT | Performed by: PHYSICIAN ASSISTANT

## 2023-08-13 PROCEDURE — 250N000013 HC RX MED GY IP 250 OP 250 PS 637: Performed by: PHYSICIAN ASSISTANT

## 2023-08-13 PROCEDURE — 36415 COLL VENOUS BLD VENIPUNCTURE: CPT | Performed by: PHYSICIAN ASSISTANT

## 2023-08-13 PROCEDURE — 250N000011 HC RX IP 250 OP 636: Performed by: STUDENT IN AN ORGANIZED HEALTH CARE EDUCATION/TRAINING PROGRAM

## 2023-08-13 PROCEDURE — 99231 SBSQ HOSP IP/OBS SF/LOW 25: CPT | Performed by: INTERNAL MEDICINE

## 2023-08-13 PROCEDURE — 250N000013 HC RX MED GY IP 250 OP 250 PS 637: Performed by: STUDENT IN AN ORGANIZED HEALTH CARE EDUCATION/TRAINING PROGRAM

## 2023-08-13 PROCEDURE — 120N000002 HC R&B MED SURG/OB UMMC

## 2023-08-13 RX ADMIN — OXYCODONE HYDROCHLORIDE 15 MG: 10 TABLET ORAL at 09:47

## 2023-08-13 RX ADMIN — ACETAMINOPHEN 975 MG: 325 TABLET, FILM COATED ORAL at 12:16

## 2023-08-13 RX ADMIN — METHOCARBAMOL 750 MG: 750 TABLET ORAL at 01:15

## 2023-08-13 RX ADMIN — OXYCODONE HYDROCHLORIDE 15 MG: 10 TABLET ORAL at 18:50

## 2023-08-13 RX ADMIN — ACETAMINOPHEN 650 MG: 325 TABLET, FILM COATED ORAL at 18:50

## 2023-08-13 RX ADMIN — PANTOPRAZOLE SODIUM 40 MG: 40 TABLET, DELAYED RELEASE ORAL at 09:48

## 2023-08-13 RX ADMIN — OXYCODONE HYDROCHLORIDE 15 MG: 10 TABLET ORAL at 01:15

## 2023-08-13 RX ADMIN — HYDROMORPHONE HYDROCHLORIDE 0.5 MG: 1 INJECTION, SOLUTION INTRAMUSCULAR; INTRAVENOUS; SUBCUTANEOUS at 04:29

## 2023-08-13 RX ADMIN — ENOXAPARIN SODIUM 40 MG: 40 INJECTION SUBCUTANEOUS at 12:16

## 2023-08-13 RX ADMIN — SENNOSIDES AND DOCUSATE SODIUM 1 TABLET: 8.6; 5 TABLET ORAL at 20:14

## 2023-08-13 RX ADMIN — SENNOSIDES AND DOCUSATE SODIUM 1 TABLET: 8.6; 5 TABLET ORAL at 09:48

## 2023-08-13 RX ADMIN — POLYETHYLENE GLYCOL 3350 17 G: 17 POWDER, FOR SOLUTION ORAL at 09:48

## 2023-08-13 RX ADMIN — OXYCODONE HYDROCHLORIDE 15 MG: 10 TABLET ORAL at 13:51

## 2023-08-13 RX ADMIN — OXYCODONE HYDROCHLORIDE 15 MG: 10 TABLET ORAL at 22:46

## 2023-08-13 RX ADMIN — ACETAMINOPHEN 975 MG: 325 TABLET, FILM COATED ORAL at 04:29

## 2023-08-13 RX ADMIN — HYDROMORPHONE HYDROCHLORIDE 0.5 MG: 1 INJECTION, SOLUTION INTRAMUSCULAR; INTRAVENOUS; SUBCUTANEOUS at 20:14

## 2023-08-13 ASSESSMENT — ACTIVITIES OF DAILY LIVING (ADL)
ADLS_ACUITY_SCORE: 43

## 2023-08-13 NOTE — PROGRESS NOTES
Brief Orthopedic Update     Will plan for OR tomorrow, 8/14.   Plan for NPO at midnight   Hold Lovenox evening and tomorrow AM dose.     Please page with questions.     Zhanna Alvarado MD

## 2023-08-13 NOTE — PROGRESS NOTES
VS: Temp: 98.7  F (37.1  C) Temp src: Oral BP: 118/74 Pulse: (!) 130   Resp: 18 SpO2: 97 % O2 Device: None (Room air)      Tachycardic, asymptomatic, on continuous monitoring   O2: On room air, denies SOB, no cough noted, denies chest pain   Output: Voids spontaneously in urinal, sometimes incorrectly applies the urinal and spills on the bed requiring a bed change   Last BM: 8/11   Activity: Bedfast, patient able to roll from side to side on the bed for incontinence cares   Skin: Intact, redness to right elbow and Right lateral hip area. Patient has tendency to position self predominantly onto the right side    Pain: 9-10/10, IV dilaudid, robaxin, scheduled tylenol, oxycodone utilized   CMS: Alert and oriented X4, able to make needs know   Dressing: N/A   Diet: REG   LDA: PIV to left hand, SL   Equipment: IV pole/pump   Plan: TBD   Additional Info:

## 2023-08-13 NOTE — PROGRESS NOTES
Brief Medicine Note:    Patient tachycardic this afternoon with HR in 140-160s, asymptomatic. Has been taking in adequate fluids per nursing, struggling with pain control.     -Stat EKG  - Will trial 1L IVF bolus over 4 hours  - Please ensure we are maximizing pain control options available to patient      TERRY Yap-AL  Internal Medicine KIT Hospitalist  [facility]

## 2023-08-13 NOTE — PROGRESS NOTES
Orthopedic Surgery Progress Note 08/13/2023    Sarbjit Swain is a 43 year old male with PMH significant for medulloblastoma at age 4 (treated here at [facility]), BRCA1 genotype, rectal cancer diagnosed around 2 years ago (treated with hemicolectomy and Capecitabine chemotherapy, which was discontinued due to an chest tightness reaction) and new lesion 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    Subjective:  Doing okay this AM. No new concerns.     Objective:  Temp: 98.7  F (37.1  C) Temp src: Oral BP: 118/74 Pulse: (!) 130   Resp: 18 SpO2: 97 % O2 Device: None (Room air)      Exam:  Gen: No acute distress, resting comfortably in bed. Alert and oriented, responds to questions appropriately.  Resp: Non-labored on RA.    MSK:  Left Lower Extremity: SILT sp/dp/tibial/saph/sural nerves. DP/PT pulses palpable, 2+, toes warm and well perfused. Fires EHL, FHL, TA, GSC.     Recent Labs   Lab 08/12/23  0643 08/11/23  0702 08/10/23  1903   WBC 13.5* 14.1* 11.1*   HGB 14.6 14.6 14.6    228 212        Assessment: Sarbjit Swain is a 43 year old male with PMH significant for medulloblastoma at age 4 (treated here at [facility]), BRCA1 genotype, rectal cancer diagnosed around 2 years ago with known metastatic lesion who sustained a left pathologic femoral neck fracture and acetabulum fracture on 8/9/2023 while he was crawling at his home. Pending OR with Dr. Chand next week. Given that medulloblastoma is not expected to metastasize and that rectal cancer is not necessarily spindle celled, we will need a pathology review prior to OR.    Plan:  - Plan for OR: Possibly tomorrow, 8/14, will discuss with OR for timing. Pending timing, will need to be NPO and hold lovenox.   - Anticoagulation/DVT Prophylaxis: Lovenox q24hrs, hold for OR once  surgical timing determined   - Antibiotics/Tetanus: Ancef perioperatively  - X-rays/Imaging: CT, X-rays, complete  - Bracing/Splinting: None  - Elevation: None  - Activity: Bedbound  - Weight bearing: Nonweightbearing left lower extremity  - Pain control: Use all oral meds first then escalate to IV, as ordered  - Diet: Regular diet over weekend  - Cultures: None  - Consults Appreciated: Medicine, Pathology  - Disposition: Pending OR  - Follow-up: TBD    Orthopedic surgery staff for this patient is Dr. Avila.    --  Zhanna Alvarado MD     Future Appointments   Date Time Provider Department [place]   8/11/2023  7:00 PM UR PT WAITLIST [facility]   8/11/2023  7:00 PM Yoly Navas, KALEIGHR UROT [place]   8/12/2023  8:15 AM UR PT WAITLIST [facility]

## 2023-08-13 NOTE — PROGRESS NOTES
[facility]    Medicine Progress Note - Hospitalist Service, GOLD TEAM 19    Date of Admission:  8/10/2023    Assessment & Plan   Sarbjit Swain is a 43 year old male admitted on 8/10/2023. He has a past medical history significant for Greene's esophagus, BPH, medulloblastoma s/p  shunt, colorectal carcinoma. He has been following in [place] with Oncology with new finding of spindle cell tumor of left ilium and had been non-weightbearing on this extremity. Suffered pathologic fracture while crawling around his home. Internal Medicine consulted for medical co-management.      Pathologic left femoral neck and acetabulum fracture  Spindle cell tumor of left ilium  ---   Recently diagnosed lesion in left ilium with biopsy showing spindle cell tumor.   ---   Suffered pathologic fracture on 8/9 while crawling around his home.   ---   Transferred from [place] for Orthopedics team consultation and likely procedure.   ---   From pre-operative risk standpoint, appears to be relatively low risk without underlying cardiac or pulmonary risk factors.   Perioperative Risk Assessment  Cardiovascular Risk. Surgery is not urgent/emergent and is not high risk. Patient does not have any active cardiac conditions. Functional capacity is >4 METs without symptoms. Patient without underlying cardiac risk factors.      RCRI:  3.9% risk of perioperative MI, pulmonary edema, VF, cardiac arrest, or complete AVB  Watson Risk Assessment:  0.1% of perioperative MI or cardiac arrest     Pulmonary Risk. does not have known underlying pulmonary disease. Risk for any post op pulmonary complications is low at 1.6% based on Canet Risk Index (age, pre op O2 sat, resp inf past month, pre op hgb < 10, surgical incision site, duration of surgery, emergency surgery).      Acute on chronic cancer related pain  ---   PTA on fentanyl patch plus oxycodone as needed.  ---   Continue fentanyl patch - would remove  prior to surgery  ---   Continue Tylenol, robaxin, oxycodone, IV dilaudid     Stage II colorectal cancer s/p hemicolectomy now s/p re-anastomosis   ---   Follows with Dr. Palma at [facility], last seen 8/7. Dx in 7/2021.   ---   Underwent hemicolectomy 9/2021 followed by re-anastomosis in 12/2021.  ---   Follow up outpatient      Hx of medulloblastoma s/p  shunt   ---   At age 4.   ---   Underwent craniotomy with resection follow by radiation and chemotherapy.   ---   Has stable right sided brain masses c/w meningiomas being followed by Neurosurgery.      BRCA1 positive   ---   Being followed by Oncology for hx of cancer.      Hx of hypothyroidism  ---   Noted in chart. Per review, no abnormal TSH/T4 to fit with diagnosis.   ---   Not on pta medication        Diet: Regular Diet Adult    DVT Prophylaxis:  Enoxaparin  Bran Catheter: Not present  Lines: None     Cardiac Monitoring: None  Code Status: Full Code    Expected Discharge Date:  TBD                Rebecca Killian MD  Hospitalist Service, GOLD TEAM [address] [place] [address]  Securely message with [employer] (more info)  Text page via [facility] Paging/Directory   See signed in provider for up to date coverage information  ______________________________________________________________________    Interval History   No complaints  Awaiting to go to OR   Pain is well controlled    Physical Exam   Vital Signs: Temp: 98.2  F (36.8  C) Temp src: Oral BP: 100/67 Pulse: (!) 124   Resp: 17 SpO2: 93 % O2 Device: None (Room air)    Weight: 175 lbs 0 oz  General Appearance: Awake. Alert and oriented x4. Laying in bed. Appears uncomfortable, no acute distress.  Eyes: Normal lids. Anicteric sclera. Pupils equal and round.  HEENT: Normocephalic. Nares patent. Mucous membranes moist.  Respiratory: Normal work of breathing on room air. Lungs CTAB. No wheezes.  Cardiovascular: RRR. S1, S2. No murmurs. Pedal pulses 2+  GI: Non-distended abdomen.  Normoactive. Soft.  Lymph/Hematologic: No abnormal or excessive bruising on exposed skin.  Skin: Warm, dry. No rashes on exposed skin.   Musculoskeletal: Moves upper extremities freely.  Neurologic: CN II-XII grossly intact.         Data     I have personally reviewed the following data over the past 24 hrs:    10.3  \   13.1 (L)   / 198     N/A N/A N/A /  N/A   N/A N/A N/A \       Imaging results reviewed over the past 24 hrs:   No results found for this or any previous visit (from the past 24 hour(s)).

## 2023-08-13 NOTE — PLAN OF CARE
VS: VSS except some tachycardia noted pt denied CP or SOB.   O2: Room air sat. 90%.   Output: Voiding adequate amount using urinal.    Last BM: 08/12/23 per pt report.    Activity: On bed rest.    Skin: Intact.    Pain: Comfortably manageable with po PRN medication.    Neuro: CMS and neuro intact to baseline.    Dressing: None.    Diet: Regular tolerating okay, NPO after midnight surgery tomorrow.    LDA: RODGER STEVE.    Equipment: IV pole and personal belongings.    Plan: Surgery tomorrow.    Additional Info:

## 2023-08-14 ENCOUNTER — APPOINTMENT (OUTPATIENT)
Dept: GENERAL RADIOLOGY | Facility: CLINIC | Age: 43
End: 2023-08-14
Attending: STUDENT IN AN ORGANIZED HEALTH CARE EDUCATION/TRAINING PROGRAM
Payer: COMMERCIAL

## 2023-08-14 ENCOUNTER — ANESTHESIA (OUTPATIENT)
Dept: SURGERY | Facility: CLINIC | Age: 43
End: 2023-08-14
Payer: COMMERCIAL

## 2023-08-14 ENCOUNTER — ANESTHESIA EVENT (OUTPATIENT)
Dept: SURGERY | Facility: CLINIC | Age: 43
End: 2023-08-14
Payer: COMMERCIAL

## 2023-08-14 LAB
ANION GAP SERPL CALCULATED.3IONS-SCNC: 13 MMOL/L (ref 7–15)
ATRIAL RATE - MUSE: 122 BPM
ATRIAL RATE - MUSE: 122 BPM
BUN SERPL-MCNC: 13.8 MG/DL (ref 6–20)
CALCIUM SERPL-MCNC: 9.9 MG/DL (ref 8.6–10)
CHLORIDE SERPL-SCNC: 93 MMOL/L (ref 98–107)
CREAT SERPL-MCNC: 0.87 MG/DL (ref 0.67–1.17)
DEPRECATED HCO3 PLAS-SCNC: 26 MMOL/L (ref 22–29)
DIASTOLIC BLOOD PRESSURE - MUSE: NORMAL MMHG
DIASTOLIC BLOOD PRESSURE - MUSE: NORMAL MMHG
ERYTHROCYTE [DISTWIDTH] IN BLOOD BY AUTOMATED COUNT: 13.4 % (ref 10–15)
GFR SERPL CREATININE-BSD FRML MDRD: >90 ML/MIN/1.73M2
GLUCOSE BLDC GLUCOMTR-MCNC: 103 MG/DL (ref 70–99)
GLUCOSE SERPL-MCNC: 97 MG/DL (ref 70–99)
HCT VFR BLD AUTO: 40.6 % (ref 40–53)
HGB BLD-MCNC: 14 G/DL (ref 13.3–17.7)
INTERPRETATION ECG - MUSE: NORMAL
INTERPRETATION ECG - MUSE: NORMAL
MAGNESIUM SERPL-MCNC: 2.1 MG/DL (ref 1.7–2.3)
MCH RBC QN AUTO: 29.2 PG (ref 26.5–33)
MCHC RBC AUTO-ENTMCNC: 34.5 G/DL (ref 31.5–36.5)
MCV RBC AUTO: 85 FL (ref 78–100)
P AXIS - MUSE: 47 DEGREES
P AXIS - MUSE: 62 DEGREES
PLATELET # BLD AUTO: 226 10E3/UL (ref 150–450)
POTASSIUM SERPL-SCNC: 4.2 MMOL/L (ref 3.4–5.3)
PR INTERVAL - MUSE: 118 MS
PR INTERVAL - MUSE: 118 MS
QRS DURATION - MUSE: 76 MS
QRS DURATION - MUSE: 78 MS
QT - MUSE: 288 MS
QT - MUSE: 308 MS
QTC - MUSE: 410 MS
QTC - MUSE: 438 MS
R AXIS - MUSE: 59 DEGREES
R AXIS - MUSE: 66 DEGREES
RBC # BLD AUTO: 4.79 10E6/UL (ref 4.4–5.9)
SODIUM SERPL-SCNC: 132 MMOL/L (ref 136–145)
SYSTOLIC BLOOD PRESSURE - MUSE: NORMAL MMHG
SYSTOLIC BLOOD PRESSURE - MUSE: NORMAL MMHG
T AXIS - MUSE: 24 DEGREES
T AXIS - MUSE: 36 DEGREES
TSH SERPL DL<=0.005 MIU/L-ACNC: 2.76 UIU/ML (ref 0.3–4.2)
VENTRICULAR RATE- MUSE: 122 BPM
VENTRICULAR RATE- MUSE: 122 BPM
WBC # BLD AUTO: 9.6 10E3/UL (ref 4–11)

## 2023-08-14 PROCEDURE — 36415 COLL VENOUS BLD VENIPUNCTURE: CPT | Performed by: INTERNAL MEDICINE

## 2023-08-14 PROCEDURE — 250N000025 HC SEVOFLURANE, PER MIN: Performed by: ORTHOPAEDIC SURGERY

## 2023-08-14 PROCEDURE — 258N000003 HC RX IP 258 OP 636: Performed by: INTERNAL MEDICINE

## 2023-08-14 PROCEDURE — 258N000003 HC RX IP 258 OP 636: Performed by: NURSE ANESTHETIST, CERTIFIED REGISTERED

## 2023-08-14 PROCEDURE — 99207 PR SERVICE NOT STAFFED W/SUPERV PROV: CPT | Performed by: STUDENT IN AN ORGANIZED HEALTH CARE EDUCATION/TRAINING PROGRAM

## 2023-08-14 PROCEDURE — 999N000180 XR SURGERY CARM FLUORO LESS THAN 5 MIN: Mod: TC

## 2023-08-14 PROCEDURE — 250N000011 HC RX IP 250 OP 636: Performed by: NURSE ANESTHETIST, CERTIFIED REGISTERED

## 2023-08-14 PROCEDURE — 999N000141 HC STATISTIC PRE-PROCEDURE NURSING ASSESSMENT: Performed by: ORTHOPAEDIC SURGERY

## 2023-08-14 PROCEDURE — 27236 TREAT THIGH FRACTURE: CPT | Mod: LT | Performed by: ORTHOPAEDIC SURGERY

## 2023-08-14 PROCEDURE — 250N000011 HC RX IP 250 OP 636: Mod: JZ | Performed by: NURSE ANESTHETIST, CERTIFIED REGISTERED

## 2023-08-14 PROCEDURE — 80048 BASIC METABOLIC PNL TOTAL CA: CPT | Performed by: INTERNAL MEDICINE

## 2023-08-14 PROCEDURE — 250N000011 HC RX IP 250 OP 636: Performed by: ANESTHESIOLOGY

## 2023-08-14 PROCEDURE — C1713 ANCHOR/SCREW BN/BN,TIS/BN: HCPCS | Performed by: ORTHOPAEDIC SURGERY

## 2023-08-14 PROCEDURE — 250N000013 HC RX MED GY IP 250 OP 250 PS 637: Performed by: ANESTHESIOLOGY

## 2023-08-14 PROCEDURE — 370N000017 HC ANESTHESIA TECHNICAL FEE, PER MIN: Performed by: ORTHOPAEDIC SURGERY

## 2023-08-14 PROCEDURE — 120N000002 HC R&B MED SURG/OB UMMC

## 2023-08-14 PROCEDURE — 0QS734Z REPOSITION LEFT UPPER FEMUR WITH INTERNAL FIXATION DEVICE, PERCUTANEOUS APPROACH: ICD-10-PCS | Performed by: ORTHOPAEDIC SURGERY

## 2023-08-14 PROCEDURE — 250N000013 HC RX MED GY IP 250 OP 250 PS 637: Performed by: STUDENT IN AN ORGANIZED HEALTH CARE EDUCATION/TRAINING PROGRAM

## 2023-08-14 PROCEDURE — 250N000009 HC RX 250: Performed by: NURSE ANESTHETIST, CERTIFIED REGISTERED

## 2023-08-14 PROCEDURE — 93005 ELECTROCARDIOGRAM TRACING: CPT

## 2023-08-14 PROCEDURE — 250N000011 HC RX IP 250 OP 636: Mod: JZ

## 2023-08-14 PROCEDURE — 710N000009 HC RECOVERY PHASE 1, LEVEL 1, PER MIN: Performed by: ORTHOPAEDIC SURGERY

## 2023-08-14 PROCEDURE — 250N000011 HC RX IP 250 OP 636: Performed by: STUDENT IN AN ORGANIZED HEALTH CARE EDUCATION/TRAINING PROGRAM

## 2023-08-14 PROCEDURE — 83735 ASSAY OF MAGNESIUM: CPT | Performed by: INTERNAL MEDICINE

## 2023-08-14 PROCEDURE — 360N000076 HC SURGERY LEVEL 3, PER MIN: Performed by: ORTHOPAEDIC SURGERY

## 2023-08-14 PROCEDURE — C1894 INTRO/SHEATH, NON-LASER: HCPCS | Performed by: ORTHOPAEDIC SURGERY

## 2023-08-14 PROCEDURE — 85027 COMPLETE CBC AUTOMATED: CPT | Performed by: INTERNAL MEDICINE

## 2023-08-14 PROCEDURE — 93010 ELECTROCARDIOGRAM REPORT: CPT | Performed by: INTERNAL MEDICINE

## 2023-08-14 PROCEDURE — 250N000013 HC RX MED GY IP 250 OP 250 PS 637: Performed by: PHYSICIAN ASSISTANT

## 2023-08-14 PROCEDURE — 0QP734Z REMOVAL OF INTERNAL FIXATION DEVICE FROM LEFT UPPER FEMUR, PERCUTANEOUS APPROACH: ICD-10-PCS | Performed by: ORTHOPAEDIC SURGERY

## 2023-08-14 PROCEDURE — 99232 SBSQ HOSP IP/OBS MODERATE 35: CPT | Performed by: INTERNAL MEDICINE

## 2023-08-14 PROCEDURE — 84443 ASSAY THYROID STIM HORMONE: CPT | Performed by: INTERNAL MEDICINE

## 2023-08-14 PROCEDURE — 272N000001 HC OR GENERAL SUPPLY STERILE: Performed by: ORTHOPAEDIC SURGERY

## 2023-08-14 DEVICE — IMPLANTABLE DEVICE: Type: IMPLANTABLE DEVICE | Site: FEMUR | Status: FUNCTIONAL

## 2023-08-14 RX ORDER — PROPOFOL 10 MG/ML
INJECTION, EMULSION INTRAVENOUS PRN
Status: DISCONTINUED | OUTPATIENT
Start: 2023-08-14 | End: 2023-08-14

## 2023-08-14 RX ORDER — ONDANSETRON 4 MG/1
4 TABLET, ORALLY DISINTEGRATING ORAL EVERY 30 MIN PRN
Status: DISCONTINUED | OUTPATIENT
Start: 2023-08-14 | End: 2023-08-14 | Stop reason: HOSPADM

## 2023-08-14 RX ORDER — HYDROMORPHONE HYDROCHLORIDE 1 MG/ML
0.2 INJECTION, SOLUTION INTRAMUSCULAR; INTRAVENOUS; SUBCUTANEOUS EVERY 5 MIN PRN
Status: DISCONTINUED | OUTPATIENT
Start: 2023-08-14 | End: 2023-08-14 | Stop reason: HOSPADM

## 2023-08-14 RX ORDER — SODIUM CHLORIDE, SODIUM LACTATE, POTASSIUM CHLORIDE, CALCIUM CHLORIDE 600; 310; 30; 20 MG/100ML; MG/100ML; MG/100ML; MG/100ML
INJECTION, SOLUTION INTRAVENOUS CONTINUOUS
Status: DISCONTINUED | OUTPATIENT
Start: 2023-08-14 | End: 2023-08-14 | Stop reason: HOSPADM

## 2023-08-14 RX ORDER — DEXAMETHASONE SODIUM PHOSPHATE 4 MG/ML
INJECTION, SOLUTION INTRA-ARTICULAR; INTRALESIONAL; INTRAMUSCULAR; INTRAVENOUS; SOFT TISSUE PRN
Status: DISCONTINUED | OUTPATIENT
Start: 2023-08-14 | End: 2023-08-14

## 2023-08-14 RX ORDER — OXYCODONE HYDROCHLORIDE 10 MG/1
10 TABLET ORAL
Status: COMPLETED | OUTPATIENT
Start: 2023-08-14 | End: 2023-08-14

## 2023-08-14 RX ORDER — CEFAZOLIN SODIUM 1 G/3ML
INJECTION, POWDER, FOR SOLUTION INTRAMUSCULAR; INTRAVENOUS PRN
Status: DISCONTINUED | OUTPATIENT
Start: 2023-08-14 | End: 2023-08-14

## 2023-08-14 RX ORDER — OXYCODONE HYDROCHLORIDE 5 MG/1
5 TABLET ORAL
Status: DISCONTINUED | OUTPATIENT
Start: 2023-08-14 | End: 2023-08-14 | Stop reason: HOSPADM

## 2023-08-14 RX ORDER — LIDOCAINE HYDROCHLORIDE 20 MG/ML
INJECTION, SOLUTION INFILTRATION; PERINEURAL PRN
Status: DISCONTINUED | OUTPATIENT
Start: 2023-08-14 | End: 2023-08-14

## 2023-08-14 RX ORDER — LABETALOL HYDROCHLORIDE 5 MG/ML
5 INJECTION, SOLUTION INTRAVENOUS
Status: DISCONTINUED | OUTPATIENT
Start: 2023-08-14 | End: 2023-08-14 | Stop reason: HOSPADM

## 2023-08-14 RX ORDER — HYDROMORPHONE HYDROCHLORIDE 1 MG/ML
0.4 INJECTION, SOLUTION INTRAMUSCULAR; INTRAVENOUS; SUBCUTANEOUS EVERY 5 MIN PRN
Status: DISCONTINUED | OUTPATIENT
Start: 2023-08-14 | End: 2023-08-14 | Stop reason: HOSPADM

## 2023-08-14 RX ORDER — FENTANYL CITRATE 50 UG/ML
25 INJECTION, SOLUTION INTRAMUSCULAR; INTRAVENOUS EVERY 5 MIN PRN
Status: DISCONTINUED | OUTPATIENT
Start: 2023-08-14 | End: 2023-08-14 | Stop reason: HOSPADM

## 2023-08-14 RX ORDER — ONDANSETRON 2 MG/ML
INJECTION INTRAMUSCULAR; INTRAVENOUS PRN
Status: DISCONTINUED | OUTPATIENT
Start: 2023-08-14 | End: 2023-08-14

## 2023-08-14 RX ORDER — ONDANSETRON 2 MG/ML
4 INJECTION INTRAMUSCULAR; INTRAVENOUS EVERY 30 MIN PRN
Status: DISCONTINUED | OUTPATIENT
Start: 2023-08-14 | End: 2023-08-14 | Stop reason: HOSPADM

## 2023-08-14 RX ORDER — SODIUM CHLORIDE, SODIUM LACTATE, POTASSIUM CHLORIDE, CALCIUM CHLORIDE 600; 310; 30; 20 MG/100ML; MG/100ML; MG/100ML; MG/100ML
INJECTION, SOLUTION INTRAVENOUS CONTINUOUS PRN
Status: DISCONTINUED | OUTPATIENT
Start: 2023-08-14 | End: 2023-08-14

## 2023-08-14 RX ORDER — FENTANYL CITRATE 50 UG/ML
50 INJECTION, SOLUTION INTRAMUSCULAR; INTRAVENOUS EVERY 5 MIN PRN
Status: DISCONTINUED | OUTPATIENT
Start: 2023-08-14 | End: 2023-08-14 | Stop reason: HOSPADM

## 2023-08-14 RX ORDER — SODIUM CHLORIDE, SODIUM LACTATE, POTASSIUM CHLORIDE, CALCIUM CHLORIDE 600; 310; 30; 20 MG/100ML; MG/100ML; MG/100ML; MG/100ML
INJECTION, SOLUTION INTRAVENOUS CONTINUOUS
Status: DISCONTINUED | OUTPATIENT
Start: 2023-08-14 | End: 2023-08-17

## 2023-08-14 RX ORDER — LIDOCAINE 40 MG/G
CREAM TOPICAL
Status: DISCONTINUED | OUTPATIENT
Start: 2023-08-14 | End: 2023-08-14 | Stop reason: HOSPADM

## 2023-08-14 RX ORDER — FENTANYL CITRATE 50 UG/ML
INJECTION, SOLUTION INTRAMUSCULAR; INTRAVENOUS PRN
Status: DISCONTINUED | OUTPATIENT
Start: 2023-08-14 | End: 2023-08-14

## 2023-08-14 RX ORDER — CEFAZOLIN SODIUM 1 G/3ML
1 INJECTION, POWDER, FOR SOLUTION INTRAMUSCULAR; INTRAVENOUS EVERY 8 HOURS
Status: COMPLETED | OUTPATIENT
Start: 2023-08-14 | End: 2023-08-15

## 2023-08-14 RX ADMIN — DEXAMETHASONE SODIUM PHOSPHATE 4 MG: 4 INJECTION, SOLUTION INTRA-ARTICULAR; INTRALESIONAL; INTRAMUSCULAR; INTRAVENOUS; SOFT TISSUE at 15:13

## 2023-08-14 RX ADMIN — SENNOSIDES AND DOCUSATE SODIUM 1 TABLET: 8.6; 5 TABLET ORAL at 20:33

## 2023-08-14 RX ADMIN — MIDAZOLAM 2 MG: 1 INJECTION INTRAMUSCULAR; INTRAVENOUS at 14:51

## 2023-08-14 RX ADMIN — OXYCODONE HYDROCHLORIDE 15 MG: 10 TABLET ORAL at 05:28

## 2023-08-14 RX ADMIN — PROPOFOL 150 MG: 10 INJECTION, EMULSION INTRAVENOUS at 15:03

## 2023-08-14 RX ADMIN — HYDROMORPHONE HYDROCHLORIDE 0.4 MG: 1 INJECTION, SOLUTION INTRAMUSCULAR; INTRAVENOUS; SUBCUTANEOUS at 17:49

## 2023-08-14 RX ADMIN — OXYCODONE HYDROCHLORIDE 10 MG: 10 TABLET ORAL at 21:51

## 2023-08-14 RX ADMIN — HYDROMORPHONE HYDROCHLORIDE 0.5 MG: 1 INJECTION, SOLUTION INTRAMUSCULAR; INTRAVENOUS; SUBCUTANEOUS at 07:04

## 2023-08-14 RX ADMIN — FENTANYL CITRATE 50 MCG: 50 INJECTION, SOLUTION INTRAMUSCULAR; INTRAVENOUS at 17:32

## 2023-08-14 RX ADMIN — HYDROMORPHONE HYDROCHLORIDE 0.5 MG: 1 INJECTION, SOLUTION INTRAMUSCULAR; INTRAVENOUS; SUBCUTANEOUS at 16:41

## 2023-08-14 RX ADMIN — OXYCODONE HYDROCHLORIDE 15 MG: 10 TABLET ORAL at 09:59

## 2023-08-14 RX ADMIN — Medication 50 MG: at 15:05

## 2023-08-14 RX ADMIN — LIDOCAINE HYDROCHLORIDE 80 MG: 20 INJECTION, SOLUTION INFILTRATION; PERINEURAL at 15:03

## 2023-08-14 RX ADMIN — LABETALOL HYDROCHLORIDE 5 MG: 5 INJECTION, SOLUTION INTRAVENOUS at 17:08

## 2023-08-14 RX ADMIN — SENNOSIDES AND DOCUSATE SODIUM 1 TABLET: 8.6; 5 TABLET ORAL at 08:36

## 2023-08-14 RX ADMIN — SODIUM CHLORIDE, POTASSIUM CHLORIDE, SODIUM LACTATE AND CALCIUM CHLORIDE 1000 ML: 600; 310; 30; 20 INJECTION, SOLUTION INTRAVENOUS at 11:26

## 2023-08-14 RX ADMIN — HYDROMORPHONE HYDROCHLORIDE 0.4 MG: 1 INJECTION, SOLUTION INTRAMUSCULAR; INTRAVENOUS; SUBCUTANEOUS at 17:37

## 2023-08-14 RX ADMIN — ONDANSETRON 4 MG: 2 INJECTION INTRAMUSCULAR; INTRAVENOUS at 16:26

## 2023-08-14 RX ADMIN — FENTANYL CITRATE 100 MCG: 50 INJECTION, SOLUTION INTRAMUSCULAR; INTRAVENOUS at 15:03

## 2023-08-14 RX ADMIN — SUGAMMADEX 200 MG: 100 INJECTION, SOLUTION INTRAVENOUS at 16:26

## 2023-08-14 RX ADMIN — PANTOPRAZOLE SODIUM 40 MG: 40 TABLET, DELAYED RELEASE ORAL at 08:36

## 2023-08-14 RX ADMIN — CEFAZOLIN 1 G: 1 INJECTION, POWDER, FOR SOLUTION INTRAMUSCULAR; INTRAVENOUS at 23:07

## 2023-08-14 RX ADMIN — SODIUM CHLORIDE, POTASSIUM CHLORIDE, SODIUM LACTATE AND CALCIUM CHLORIDE: 600; 310; 30; 20 INJECTION, SOLUTION INTRAVENOUS at 14:51

## 2023-08-14 RX ADMIN — ENOXAPARIN SODIUM 40 MG: 40 INJECTION SUBCUTANEOUS at 20:58

## 2023-08-14 RX ADMIN — FENTANYL CITRATE 50 MCG: 50 INJECTION, SOLUTION INTRAMUSCULAR; INTRAVENOUS at 15:45

## 2023-08-14 RX ADMIN — METHOCARBAMOL 750 MG: 750 TABLET ORAL at 08:36

## 2023-08-14 RX ADMIN — CEFAZOLIN 2 G: 1 INJECTION, POWDER, FOR SOLUTION INTRAMUSCULAR; INTRAVENOUS at 14:58

## 2023-08-14 RX ADMIN — ACETAMINOPHEN 650 MG: 325 TABLET, FILM COATED ORAL at 08:36

## 2023-08-14 RX ADMIN — FENTANYL CITRATE 50 MCG: 50 INJECTION, SOLUTION INTRAMUSCULAR; INTRAVENOUS at 16:38

## 2023-08-14 RX ADMIN — OXYCODONE HYDROCHLORIDE 10 MG: 5 TABLET ORAL at 17:50

## 2023-08-14 RX ADMIN — FENTANYL CITRATE 50 MCG: 50 INJECTION, SOLUTION INTRAMUSCULAR; INTRAVENOUS at 17:26

## 2023-08-14 ASSESSMENT — ACTIVITIES OF DAILY LIVING (ADL)
ADLS_ACUITY_SCORE: 43
ADLS_ACUITY_SCORE: 45
ADLS_ACUITY_SCORE: 43
ADLS_ACUITY_SCORE: 45
ADLS_ACUITY_SCORE: 43
ADLS_ACUITY_SCORE: 45
ADLS_ACUITY_SCORE: 43
ADLS_ACUITY_SCORE: 43
ADLS_ACUITY_SCORE: 45

## 2023-08-14 NOTE — PROGRESS NOTES
Pt went down for surgery at 1245, arrived back to unit around 1815. Received general anesthesia, EBL 10.     VS: VSS. Denies CP/SOB. Alert, disoriented to date, didn't know exact date, knew it was august. Periodically forgetful/confused. Asks where wife is. Dr. Killian made aware of overnight confusion.   Upon arrival back to unit after surgery pt was A/Ox4, had some difficulty remembering but eventually did.   O2: >90% on 1 LPM while sleeping   Output: Voiding adequate amounts w/o pain or difficulty    Last BM: 8/13 per pt report    Activity: Bedrest prior to surgery, able to position self in bed independently.  TTWB LLE, not OOB yet. No hip precautions per Ortho.    Skin: R elbow blanchable redness- educated to position off elbow.   Pain: Pain in L hip managing with PRN oxycodone, robaxin and tylenol. Pt refused ice and heat packs.    CMS: Intact    Dressing: CDI   Diet: Regular, declined dinner.    LDA: PIV infusing   Equipment: IV pole,    Plan: TBD   Additional Info: Wife and family at bedside

## 2023-08-14 NOTE — BRIEF OP NOTE
[facility]    Brief Operative Note    Pre-operative diagnosis: Pathological fracture, pelvis, sequela [M84.454S]  Post-operative diagnosis Same as pre-operative diagnosis    Procedure: Procedure(s):  OPEN REDUCTION INTERNAL FIXATION RIGHT FEMORAL NECK, USING HANA TABLE  Surgeon: Surgeon(s) and Role:     * Dann Avila MD - Primary     * Fox Bethea MD - Resident - Assisting  Anesthesia: General   Estimated Blood Loss: 10mL     Drains: None  Specimens: * No specimens in log *  Findings:   See dictated op report  Complications: None.    Implants:   Implant Name Type Inv. Item Serial No.  Lot No. LRB No. Used Action   WASHER ASNIS III FOR SCREW 6.5/8.0MM [id number] - [id number] Metallic Hardware/[employer] WASHER ASNIS III FOR SCREW 6.5/8.0MM [id number]  [employer] N/A Left 3 Implanted       Assessment/Plan: Sarbjit Swain is a 43 year old male with PMH significant for medulloblastoma at age 4 (treated here at [facility]), BRCA1 genotype, rectal cancer diagnosed around 2 years ago with known metastatic lesion who sustained a left pathologic femoral neck fracture and acetabulum fracture on 8/9/2023 while he was crawling at his home. Patient now s/p CRPP L hip on 8/14/23 with Dr. Avila.    Post op patient will transfer to [place] for further onc w/u and treatment. Discussed with medicine team who will assist with patient transfer and coordinating indicated treatments.     - Primary: Ortho for now; After transferring to [place], Medicine will take over as primary team.   - Anticoagulation/DVT Prophylaxis: Lovenox 40mg qd  - Antibiotics/Tetanus: Ancef perioperatively  - X-rays/Imaging: MRI pelvis with and without contrast  - Bracing/Splinting: None  - Elevation: None  - Activity: bed to chair   - Weight bearing: TTWB LLE  - Pain control: Use all oral meds first then escalate to IV, as ordered  - Diet: ADAT  - Cultures:  None  - Consults Appreciated: Medicine, Oncology, Pathology  - Disposition: TBD  - Follow-up: TBD     Fox Bethea MD  Orthopaedic Surgery, PGY-4  Pager: [phone]

## 2023-08-14 NOTE — ANESTHESIA CARE TRANSFER NOTE
Patient: Sarbjit Swain    Procedure: Procedure(s):  OPEN REDUCTION INTERNAL FIXATION RIGHT FEMORAL NECK, USING HANA TABLE       Diagnosis: Pathological fracture, pelvis, sequela [M58.710S]  Diagnosis Additional Information: No value filed.    Anesthesia Type:   General     Note:    Oropharynx: oropharynx clear of all foreign objects and spontaneously breathing  Level of Consciousness: drowsy  Oxygen Supplementation: face mask  Level of Supplemental Oxygen (L/min / FiO2): 10    Dentition: dentition unchanged  Vital Signs Stable: post-procedure vital signs reviewed and stable  Report to RN Given: handoff report given  Patient transferred to: PACU    Handoff Report: Identifed the Patient, Identified the Reponsible Provider, Reviewed the pertinent medical history, Discussed the surgical course, Reviewed Intra-OP anesthesia mangement and issues during anesthesia, Set expectations for post-procedure period and Allowed opportunity for questions and acknowledgement of understanding      Vitals:  Vitals Value Taken Time   /116 08/14/23 1645   Temp     Pulse 135 08/14/23 1646   Resp 24 08/14/23 1646   SpO2 100 % 08/14/23 1646   Vitals shown include unvalidated device data.    Electronically Signed By: ISRAEL WESTFALL APRN CRNA  August 14, 2023  4:47 PM

## 2023-08-14 NOTE — ANESTHESIA PREPROCEDURE EVALUATION
Anesthesia Pre-Procedure Evaluation    Patient: Sarbjit Swain   MRN: [id number] : 1980        Procedure : Procedure(s):  INTERNAL FIXATION, FRACTURE, TROCHANTERIC, HIP, USING PINS OR RODS, USING HANA TABLE          No past medical history on file.   No past surgical history on file.   No Known Allergies   Social History     Tobacco Use    Smoking status: Not on file    Smokeless tobacco: Not on file   Substance Use Topics    Alcohol use: Not on file      Wt Readings from Last 1 Encounters:   23 79.4 kg (175 lb)        Anesthesia Evaluation            ROS/MED HX  ENT/Pulmonary:       Neurologic: Comment:  shunt placed at age 4 for hydrocephalus secondary to medulloblastoma      Cardiovascular:       METS/Exercise Tolerance:     Hematologic:       Musculoskeletal: Comment: Spindle cell tumor left ileum which resulted in pathologic fracture of femoral neck while crawling on ground (trying not to weight bear).      GI/Hepatic:     (+) GERD (Greene's esophagitis),                   Renal/Genitourinary:     (+)        BPH,      Endo:     (+)          thyroid problem, hypothyroidism,    Obesity,       Psychiatric/Substance Use:       Infectious Disease:       Malignancy:   (+) Malignancy, History of GI and Neuro.Neuro CA Remission status post Surgery and Chemo. GI CA  Remission status post Surgery and Chemo.      Other:            Physical Exam    Airway        Mallampati: III   TM distance: > 3 FB   Neck ROM: full   Mouth opening: < 3 cm    Respiratory Devices and Support         Dental       (+) Minor Abnormalities - some fillings, tiny chips      Cardiovascular   cardiovascular exam normal          Pulmonary   pulmonary exam normal                OUTSIDE LABS:  CBC:   Lab Results   Component Value Date    WBC 9.6 2023    WBC 10.3 2023    HGB 14.0 2023    HGB 13.1 (L) 2023    HCT 40.6 2023    HCT 38.5 (L) 2023     2023     2023     BMP:    Lab Results   Component Value Date     (L) 08/14/2023     (L) 08/11/2023    POTASSIUM 4.2 08/14/2023    POTASSIUM 4.0 08/11/2023    CHLORIDE 93 (L) 08/14/2023    CHLORIDE 99 08/11/2023    CO2 26 08/14/2023    CO2 25 08/11/2023    BUN 13.8 08/14/2023    BUN 19.5 08/11/2023    CR 0.87 08/14/2023    CR 1.00 08/11/2023    GLC 97 08/14/2023     (H) 08/14/2023     COAGS:   Lab Results   Component Value Date    INR 1.10 08/10/2023     POC: No results found for: BGM, HCG, HCGS  HEPATIC:   Lab Results   Component Value Date    ALBUMIN 3.6 08/11/2023    PROTTOTAL 6.8 08/11/2023    ALT 18 08/11/2023    AST 65 (H) 08/11/2023    ALKPHOS 573 (H) 08/11/2023    BILITOTAL 1.5 (H) 08/11/2023     OTHER:   Lab Results   Component Value Date    JESSICA 9.9 08/14/2023    MAG 2.1 08/14/2023    TSH 2.76 08/14/2023       Anesthesia Plan    ASA Status:  3       Anesthesia Type: General.     - Airway: ETT   Induction: Intravenous.   Maintenance: Balanced.   Techniques and Equipment:     - Airway: Video-Laryngoscope     - Lines/Monitors: 2nd IV     Consents    Anesthesia Plan(s) and associated risks, benefits, and realistic alternatives discussed. Questions answered and patient/representative(s) expressed understanding.     - Discussed:     - Discussed with:  Patient      - Extended Intubation/Ventilatory Support Discussed: No.      - Patient is DNR/DNI Status: No     Use of blood products discussed: Yes.     - Discussed with: Patient.     - Consented: consented to blood products            Reason for refusal: other.     Postoperative Care    Pain management: IV analgesics, Multi-modal analgesia.   PONV prophylaxis: Ondansetron (or other 5HT-3), Dexamethasone or Solumedrol     Comments:                LORETTA BEST MD

## 2023-08-14 NOTE — CONSULTS
BRIEF ONCOLOGY NOTE      This patient was not seen by the oncology team today. Chart and labs were reviewed. Patient was discussed with primary team.     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     The patient began experiencing left hip pain around April/May 2023. MRI of left hip on 6/30 revealed infiltrative metastatic lesion in the left acetabulum extending into the superior pubic ramus with associated nondisplaced acetabular fracture. Patient later had a PET on 7/21 that showed an FDG avid mass involving the left pelvis and proximal femur. PET also revealed multiple bilateral FDG avid intrapulmonary and subpleural nodules, favoring metastasis over primary pulmonary malignancy. Additionally, there was nonspecific FDG uptake along the gallbladder fundus and equivocal punctate focus of uptake in the subscapular left hepatic lobe. The patient underwent biopsy on 7/31 at [facility] and the biopsy later resulted as high-grade chondroblastic osteosarcoma. Meanwhile, the patient was transferred to the orthopedic service at [facility] from [facility] on 8/9 with a pathological subcapital fracture of the left femur with acetabular involvement. Oncology was consulted for further management of what is likely stage IV osteosarcoma.     Recommendations:   - recommend that patient proceed with surgical fixation of hip before initiating chemotherapy  - recommend biopsy of one of the thoracic metastases to confirm stage IV disease  - confirm with patient whether he prefers to receive medical oncology care at [facility] or at [facility]. Patient recently saw Dr. Kee Palma at [facility] on 8/7, who was  planning to start treatment with high-dose methotrexate, doxorubicin, cisplatin, and ifosfamide once biopsy results were finalized. If patient prefers to receive care here, our team will arrange follow up for him for chemotherapy.     This patient's case was discussed with oncology attending, Dr. Vigil.     We will continue to follow the patient peripherally and are available for any goals of care discussions. Please do not hesitate to reach out if there are questions/concerns in the mean time.     Maryan Tyler MD  Internal Medicine PGY2

## 2023-08-14 NOTE — PROGRESS NOTES
[facility]    Medicine Progress Note - Hospitalist Service, GOLD TEAM 19    Date of Admission:  8/10/2023    Assessment & Plan   Sarbjit Swain is a 43 year old male admitted on 8/10/2023. He has a past medical history significant for Greene's esophagus, BPH, medulloblastoma s/p  shunt, colorectal carcinoma. He has been following in [place] with Oncology with new finding of spindle cell tumor of left ilium and had been non-weightbearing on this extremity. Suffered pathologic fracture while crawling around his home. Internal Medicine consulted for medical co-management.      Pathologic left femoral neck and acetabulum fracture  Spindle cell tumor of left ilium  ---   Recently diagnosed lesion in left ilium with biopsy showing spindle cell tumor.   ---   Suffered pathologic fracture on 8/9 while crawling around his home.   ---   Transferred from [place] for Orthopedics team consultation and likely procedure.   ---   From pre-operative risk standpoint, appears to be relatively low risk without underlying cardiac or pulmonary risk factors.   Perioperative Risk Assessment  Cardiovascular Risk. Surgery is not urgent/emergent and is not high risk. Patient does not have any active cardiac conditions. Functional capacity is >4 METs without symptoms. Patient without underlying cardiac risk factors.      RCRI:  3.9% risk of perioperative MI, pulmonary edema, VF, cardiac arrest, or complete AVB  Watson Risk Assessment:  0.1% of perioperative MI or cardiac arrest     Pulmonary Risk. does not have known underlying pulmonary disease. Risk for any post op pulmonary complications is low at 1.6% based on Canet Risk Index (age, pre op O2 sat, resp inf past month, pre op hgb < 10, surgical incision site, duration of surgery, emergency surgery).      Sinus tachycardia  ---   Probably physiologic response to uncontrolled pain  ---   May use BBC or CCB intra-op if needed  ---   Pt is  currently NPO  ---   LR one liter bolus followed by 125 ml/hr ordered  ---   Pt denied CP, palpitation or SOB    Hyponatremia  ---   Na level 132  ---   Suspect due to hypovolemia  ---   LR one liter bolus followed by 125 ml/hr ordered    Acute on chronic cancer related pain  ---   PTA on fentanyl patch plus oxycodone as needed.  ---   Continue fentanyl patch - would remove prior to surgery  ---   Continue Tylenol, robaxin, oxycodone, IV dilaudid     Stage II colorectal cancer s/p hemicolectomy now s/p re-anastomosis   ---   Follows with Dr. Palma at [facility], last seen 8/7. Dx in 7/2021.   ---   Underwent hemicolectomy 9/2021 followed by re-anastomosis in 12/2021.  ---   Follow up outpatient      Hx of medulloblastoma s/p  shunt   ---   At age 4.   ---   Underwent craniotomy with resection follow by radiation and chemotherapy.   ---   Has stable right sided brain masses c/w meningiomas being followed by Neurosurgery.      BRCA1 positive   ---   Being followed by Oncology for hx of cancer.      Hx of hypothyroidism  ---   Noted in chart. Per review, no abnormal TSH/T4 to fit with diagnosis.   ---   Not on pta medication        Diet: NPO per Anesthesia Guidelines for Procedure/Surgery Except for: Meds    DVT Prophylaxis:  Enoxaparin  Bran Catheter: Not present  Lines: None     Cardiac Monitoring: None  Code Status: Full Code    Expected Discharge Date:  TBD                Rebecca Killian MD  Hospitalist Service, GOLD TEAM 19  [facility]  Securely message with [employer] (more info)  Text page via [employer] Paging/Directory   See signed in provider for up to date coverage information  ______________________________________________________________________    Interval History   No complaints  Awaiting to go to OR   Pain not optimally controlled    Physical Exam   Vital Signs: Temp: 99.3  F (37.4  C) Temp src: Oral BP: 104/69 Pulse: 112   Resp: 16 SpO2: 97 % O2 Device:  Nasal cannula Oxygen Delivery: 1 LPM  Weight: 175 lbs 0 oz  General Appearance: Awake. Alert and oriented x4. Laying in bed. Appears uncomfortable, no acute distress.  Eyes: Normal lids. Anicteric sclera. Pupils equal and round.  HEENT: Normocephalic. Nares patent. Mucous membranes moist.  Respiratory: Normal work of breathing on room air. Lungs CTAB. No wheezes.  Cardiovascular: Tachycardic, nl s1 and s2, no m/r/g. Pedal pulses 2+  GI: Non-distended abdomen. Normoactive. Soft.  Lymph/Hematologic: No abnormal or excessive bruising on exposed skin.  Skin: Warm, dry. No rashes on exposed skin.   Musculoskeletal: Moves upper extremities freely.  Neurologic: CN II-XII grossly intact.         Data     I have personally reviewed the following data over the past 24 hrs:    9.6  \   14.0   / 226     132 (L) 93 (L) 13.8 /  97   4.2 26 0.87 \     TSH: 2.76 T4: N/A A1C: N/A       Imaging results reviewed over the past 24 hrs:   No results found for this or any previous visit (from the past 24 hour(s)).

## 2023-08-14 NOTE — OP NOTE
DATE OF SURGERY: 8/14/2023    PREOPERATIVE DIAGNOSIS: Pathologic left femoral neck fracture    POSTOPERATIVE DIAGNOSIS: Pathologic left femoral neck fracture    PROCEDURE: Internal fixation left femoral neck    SURGEON: Dann Avila MD     ASSISTANT: Christian Saleh MD this patient is transferred in from outside facility after    PATIENT HISTORY: Being found to have a pathologic fracture in his left pelvis from the tumor and also subsequent left femoral neck fracture related to that same tumor.  We recently received the pathology report and this is been described as a complex chondrosarcoma.  The patient also has some concerning lesions in the lungs and likely had multiple lung metastases.  The family understands that pinning of the neck may allow him to be more comfortable in bed.  Its not likely that this fracture will heal and in fact it is very possible that excision may eventually fail.  There is risk of lead infection pain numbness tingling.  This would not allow him to reliably weight-bear in fact we will counseled that he is only touchdown weightbearing after this procedure.  We are hopeful that this will improve his pain enough that he can sleep better and then we will proceed with doing some chemotherapy before we revisit his primary tumor.    DESCRIPTION OF PROCEDURE: The patient underwent successful induction of general anesthesia.  We positioned the patient on the fracture table and we applied some traction until the neck was optimally reduced.  The patient's left leg and hip area were washed and sterilely prepped and draped.  We made a small incision over the lateral hip under C-arm guidance and placed a guidewire into the inferior and superior aspect of the femoral neck.  We placed screws across the fracture and compressed the superior screw first followed by the screw placed inferiorly.  These were both partially-threaded screws with washers on them.  We then placed additional 2 screws in the  mid neck area, 1 with a washer and 1 without.  Good purchase was obtained from the screws.  We noticed that the screw in the dorsum of the neck was now protruding slightly and was too long as it would go into the joint if we retightened it.  The screw was removed and exchanged one 5 mm shorter again with a washer.  This time we placed longer threads on screw and got a good purchase.  All screws seem to have a good purchase.  We are happy with the final configuration of the screws and position of the fracture.  We then copiously irrigated and closed with 2-0 Vicryl in the subcutaneous tissue Monocryl inin the skin Steri-Strips and a sterile dress.  The patient was then extubated and taken to the recovery room in stable condition.  The estimated blood loss is 10 mL.  There were no complications.  I was present for all critical portions of the procedure.    Dann Avila MD

## 2023-08-14 NOTE — PROGRESS NOTES
Orthopedic Surgery Progress Note 08/14/2023     Sarbjit Swain is a 43 year old male with PMH significant for medulloblastoma at age 4 (treated here at [facility]), BRCA1 genotype, rectal cancer diagnosed around 2 years ago (treated with hemicolectomy and Capecitabine chemotherapy, which was discontinued due to an chest tightness reaction) and new lesion in the left ilium (showing showing high grade chondroblastic osteosarcoma per 8/11 pathology read) who sustained a left pathologic femoral neck fracture and acetabulum fracture on 8/9/2023 while he was crawling at his home.     Interval Events:  -NAEON, AFVSS  -Possible OR today pending discussions with medical oncology, appreciate consult given [facility] biopsy showing high grade chondroblastic osteosarcoma      Subjective:  Pain 10/10 but better than last night. Denies any new numbness or tingling. IV/Oral meds. Has been in bed.     Objective:  Temp: 97.9  F (36.6  C) Temp src: Oral BP: 109/70 Pulse: (!) 125   Resp: 17 SpO2: 98 % O2 Device: None (Room air)       Exam:  Gen: No acute distress, resting comfortably in bed. Alert and oriented, responds to questions appropriately.  Resp: Non-labored on RA.     MSK:  Left Lower Extremity: SILT sp/dp/tibial/saph/sural nerves. DP/PT pulses palpable, 2+, toes warm and well perfused.            Recent Labs   Lab 08/13/23  0907 08/12/23  0643 08/11/23  0702   WBC 10.3 13.5* 14.1*   HGB 13.1* 14.6 14.6    218 228         Assessment: Sarbjti Swain is a 43 year old male with PMH significant for medulloblastoma at age 4 (treated here at [facility]), BRCA1 genotype, rectal cancer diagnosed around 2 years ago with known metastatic lesion who sustained a left pathologic femoral neck fracture and acetabulum fracture on 8/9/2023 while he was crawling at his home. Pending OR with Dr. Chand next week. Biopsy consultation from [facility] that resulted on 8/11 with high grade chondroblastic  osteosarcoma.     Plan:  - Plan for OR: With Dr. Chand this week. Requires oncology consultation prior to any operative intervention              -Consent: TBD              -Pre-op labs: CBC, BMP, INR, type and screen              -Medicine clearance: Appreciated  - Anticoagulation/DVT Prophylaxis: Lovenox q24hrs, hold for OR   - Antibiotics/Tetanus: Ancef perioperatively  - X-rays/Imaging: CT, X-rays, trying to get PET CT pushed over, possibly require new MRI  - Bracing/Splinting: None  - Elevation: None  - Activity: Bedbound  - Weight bearing: Nonweightbearing left lower extremity  - Pain control: Use all oral meds first then escalate to IV, as ordered  - Diet: NPO at midnight for OR  - Cultures: None  - Consults Appreciated: Medicine, Oncology, Pathology  - Disposition: Pending OR, possible today  - Follow-up: TBD     Orthopedic surgery staff for this patient is Dr. Avila.     --  Avila Daniel MD, PhD  Orthopedic Surgery PGY-1  [phone]

## 2023-08-14 NOTE — ANESTHESIA POSTPROCEDURE EVALUATION
Patient: Sarbjit Swain    Procedure: Procedure(s):  OPEN REDUCTION INTERNAL FIXATION RIGHT FEMORAL NECK, USING HANA TABLE       Anesthesia Type:  General    Note:  Disposition: Inpatient   Postop Pain Control: Uneventful            Sign Out: Well controlled pain   PONV: No   Neuro/Psych: Uneventful            Sign Out: Acceptable/Baseline neuro status   Airway/Respiratory: Uneventful            Sign Out: Acceptable/Baseline resp. status   CV/Hemodynamics: Uneventful            Sign Out: Acceptable CV status; No obvious hypovolemia; No obvious fluid overload   Other NRE: NONE   DID A NON-ROUTINE EVENT OCCUR? No           Last vitals:  Vitals Value Taken Time   /89 08/14/23 1715   Temp 37.2  C (99  F) 08/14/23 1639   Pulse 119 08/14/23 1726   Resp 11 08/14/23 1726   SpO2 96 % 08/14/23 1726   Vitals shown include unvalidated device data.    Electronically Signed By: Bakari Dunham DO  August 14, 2023  5:27 PM

## 2023-08-14 NOTE — PLAN OF CARE
VS: VSS ex tachycardia    O2: Room air    Output: Voiding adequate amount using urinal.    Last BM: Small BM this shift 8/13   Activity: Bed rest   Skin: Intact ex redness on elbow   Pain: IV dilaudid and oxycodone given for pain   Neuro: CMS and neuro intact to baseline ex some disorientation with time, easily rediectable    Dressing: None.    Diet: Regular tolerating okay, NPO after midnight surgery tomorrow.    LDA: RODGER STEVE.    Plan: Surgery tomorrow no time yet   Additional Info:  First surgical scrub completed

## 2023-08-14 NOTE — PLAN OF CARE
"/70 (BP Location: Left arm, Patient Position: Right side, Cuff Size: Adult Regular)   Pulse (!) 125   Temp 97.9  F (36.6  C) (Oral)   Resp 17   Ht 1.6 m (5' 3\")   Wt 79.4 kg (175 lb)   SpO2 98%   BMI 31.00 kg/m    Patient alert and oriented with intermittent confusion. Did not sleep much this shift. Pain managed with PRN Oxycodone with effective result. Voiding adequately via urinal. Left  PIV SL. Remains NPO status. Resting in bed with call light within reach. Continue with current care plan.  "

## 2023-08-14 NOTE — ANESTHESIA PROCEDURE NOTES
Airway       Patient location during procedure: OR       Procedure Start/Stop Times: 8/14/2023 3:10 PM  Staff -        CRNA: Madie Rust APRN CRNA       Performed By: CRNA  Consent for Airway        Urgency: elective  Indications and Patient Condition       Indications for airway management: delmy-procedural       Induction type:intravenous       Mask difficulty assessment: 2 - vent by mask + OA or adjuvant +/- NMBA    Final Airway Details       Final airway type: endotracheal airway       Successful airway: ETT - single and Oral  Endotracheal Airway Details        ETT size (mm): 7.0       Cuffed: yes       Inital cuff pressure (cm H2O): 20       Successful intubation technique: video laryngoscopy       VL Blade Size: MAC 3       Grade View of Cords: 1       Adjucts: stylet       Position: Right       Measured from: gums/teeth       Secured at (cm): 23       Bite block used: None    Post intubation assessment        Placement verified by: capnometry, equal breath sounds and chest rise        Number of attempts at approach: 1       Secured with: silk tape       Ease of procedure: easy       Dentition: Intact and Unchanged    Medication(s) Administered   Medication Administration Time: 8/14/2023 3:10 PM    Additional Comments       Recommend glidescope with lower profile

## 2023-08-14 NOTE — PROGRESS NOTES
PACU to Inpatient Nursing Handoff    Patient Sarbjit Swain is a 43 year old male who speaks English.   Procedure Procedure(s):  OPEN REDUCTION INTERNAL FIXATION RIGHT FEMORAL NECK, USING HANA TABLE   Surgeon(s) Primary: Dann Avila MD  Resident - Assisting: Fox Bethea MD     Allergies   Allergen Reactions    Capecitabine Other (See Comments)     Severe chest pain, lasted 5 days of taking med plus infusion    Seasonal Allergies Itching       Isolation  No active isolations     Past Medical History   has no past medical history on file.    Anesthesia General   Dermatome Level     Preop Meds Not applicable   Nerve block Not applicable   Intraop Meds dexamethasone (Decadron)  fentanyl (Sublimaze): 200 mcg total  hydromorphone (Dilaudid): 0.5 mg total  ondansetron (Zofran): last given at 1626  Versed 2mg    Local Meds No   Antibiotics cefazolin (Ancef) - last given at 1458     Pain Patient Currently in Pain: yes   PACU meds  fentanyl (Sublimaze): 100 mcg (total dose) last given at 1732   hydromorphone (Dilaudid): 0.4 mg (total dose) last given at 1737   labetalol (Normodyne/Trandate): 5 mg (total dose) last given at 1708    PCA / epidural No   Capnography Respiratory Monitoring (EtCO2): 41 mmHg   Telemetry ECG Rhythm: Sinus tachycardia   Inpatient Telemetry Monitor Ordered? No        Labs Glucose Lab Results   Component Value Date    GLC 97 08/14/2023     08/14/2023       Hgb Lab Results   Component Value Date    HGB 14.0 08/14/2023       INR Lab Results   Component Value Date    INR 1.10 08/10/2023      PACU Imaging Not applicable     Wound/Incision Incision/Surgical Site 08/14/23 Left Hip (Active)   Incision Assessment UTV 08/14/23 1730   Maren-Incision Assessment UTV 08/14/23 1639   Closure Adhesive strip(s);Sutures 08/14/23 1730   Incision Drainage Amount None 08/14/23 1730   Incision Care Ice applied 08/14/23 1730   Dressing Intervention Clean, dry, intact 08/14/23 1730   Number of  days: 0      CMS        Equipment ice pack   Other LDA       IV Access Peripheral IV 08/10/23 Left;Dorsal Hand (Active)   Site Assessment WDL 08/14/23 1639   Line Status Infusing 08/14/23 1639   Dressing Transparent 08/14/23 1639   Dressing Status clean;dry;intact 08/14/23 1639   Line Intervention Flushed 08/14/23 0800   Phlebitis Scale 0-->no symptoms 08/14/23 1639   Infiltration? no 08/13/23 0100   Number of days: 4      Blood Products Not applicable EBL 10mL   Intake/Output Date 08/14/23 0700 - 08/15/23 0659   Shift 2100-6981 4763-6530 5578-9512 24 Hour Total   INTAKE   I.V.  900  900   Shift Total(mL/kg)  900(11.34)  900(11.34)   OUTPUT   Urine 500   500   Shift Total(mL/kg) 500(6.3)   500(6.3)   Weight (kg) 79.38 79.38 79.38 79.38      Drains / Bran     Time of void PreOp Time of Void Prior to Procedure: 1255 (08/14/23 1256)    PostOp Voided (mL): 150 mL (08/14/23 1400)  Urine Occurrence: 1 (08/11/23 0400)    Diapered? No   Bladder Scan   VOIDED Post op 225ml @1700   PO    ice chips     Vitals    B/P: (!) 125/97  T: 99  F (37.2  C)    Temp src: Axillary  P:  Pulse: 119 (08/14/23 1730)          R: 19  O2:  SpO2: 96 %    O2 Device: Nasal cannula (08/14/23 1700)    Oxygen Delivery: 2 LPM (08/14/23 1700)         Family/support present significant other   Patient belongings     Patient transported on bed   DC meds/scripts (obs/outpt) Not applicable   Inpatient Pain Meds Released? Yes       Special needs/considerations None   Tasks needing completion None       Isabell Toney, RN  [facility] [id number]

## 2023-08-15 ENCOUNTER — APPOINTMENT (OUTPATIENT)
Dept: MRI IMAGING | Facility: CLINIC | Age: 43
End: 2023-08-15
Attending: STUDENT IN AN ORGANIZED HEALTH CARE EDUCATION/TRAINING PROGRAM
Payer: COMMERCIAL

## 2023-08-15 ENCOUNTER — APPOINTMENT (OUTPATIENT)
Dept: PHYSICAL THERAPY | Facility: CLINIC | Age: 43
End: 2023-08-15
Attending: STUDENT IN AN ORGANIZED HEALTH CARE EDUCATION/TRAINING PROGRAM
Payer: COMMERCIAL

## 2023-08-15 LAB
ALBUMIN UR-MCNC: 10 MG/DL
APPEARANCE UR: CLEAR
BILIRUB UR QL STRIP: NEGATIVE
COLOR UR AUTO: YELLOW
ERYTHROCYTE [DISTWIDTH] IN BLOOD BY AUTOMATED COUNT: 13.7 % (ref 10–15)
GLUCOSE UR STRIP-MCNC: NEGATIVE MG/DL
HCT VFR BLD AUTO: 33.2 % (ref 40–53)
HGB BLD-MCNC: 11.2 G/DL (ref 13.3–17.7)
HGB UR QL STRIP: NEGATIVE
HOLD SPECIMEN: NORMAL
HYALINE CASTS: 1 /LPF
KETONES UR STRIP-MCNC: NEGATIVE MG/DL
LEUKOCYTE ESTERASE UR QL STRIP: NEGATIVE
MCH RBC QN AUTO: 28.6 PG (ref 26.5–33)
MCHC RBC AUTO-ENTMCNC: 33.7 G/DL (ref 31.5–36.5)
MCV RBC AUTO: 85 FL (ref 78–100)
MUCOUS THREADS #/AREA URNS LPF: PRESENT /LPF
NITRATE UR QL: NEGATIVE
PH UR STRIP: 5.5 [PH] (ref 5–7)
PLATELET # BLD AUTO: 215 10E3/UL (ref 150–450)
RBC # BLD AUTO: 3.91 10E6/UL (ref 4.4–5.9)
RBC URINE: <1 /HPF
SP GR UR STRIP: 1.02 (ref 1–1.03)
UROBILINOGEN UR STRIP-MCNC: NORMAL MG/DL
WBC # BLD AUTO: 8.6 10E3/UL (ref 4–11)
WBC URINE: 1 /HPF

## 2023-08-15 PROCEDURE — 255N000002 HC RX 255 OP 636: Mod: JZ | Performed by: STUDENT IN AN ORGANIZED HEALTH CARE EDUCATION/TRAINING PROGRAM

## 2023-08-15 PROCEDURE — 81001 URINALYSIS AUTO W/SCOPE: CPT | Performed by: INTERNAL MEDICINE

## 2023-08-15 PROCEDURE — 72197 MRI PELVIS W/O & W/DYE: CPT

## 2023-08-15 PROCEDURE — 258N000003 HC RX IP 258 OP 636: Performed by: INTERNAL MEDICINE

## 2023-08-15 PROCEDURE — A9585 GADOBUTROL INJECTION: HCPCS | Mod: JZ | Performed by: STUDENT IN AN ORGANIZED HEALTH CARE EDUCATION/TRAINING PROGRAM

## 2023-08-15 PROCEDURE — 250N000011 HC RX IP 250 OP 636: Performed by: STUDENT IN AN ORGANIZED HEALTH CARE EDUCATION/TRAINING PROGRAM

## 2023-08-15 PROCEDURE — 85027 COMPLETE CBC AUTOMATED: CPT | Performed by: PHYSICIAN ASSISTANT

## 2023-08-15 PROCEDURE — 97161 PT EVAL LOW COMPLEX 20 MIN: CPT | Mod: GP

## 2023-08-15 PROCEDURE — 250N000011 HC RX IP 250 OP 636: Mod: JZ

## 2023-08-15 PROCEDURE — 99207 PR SERVICE NOT STAFFED W/SUPERV PROV: CPT | Performed by: STUDENT IN AN ORGANIZED HEALTH CARE EDUCATION/TRAINING PROGRAM

## 2023-08-15 PROCEDURE — 97110 THERAPEUTIC EXERCISES: CPT | Mod: GP

## 2023-08-15 PROCEDURE — 99231 SBSQ HOSP IP/OBS SF/LOW 25: CPT | Performed by: INTERNAL MEDICINE

## 2023-08-15 PROCEDURE — 97530 THERAPEUTIC ACTIVITIES: CPT | Mod: GP

## 2023-08-15 PROCEDURE — 250N000013 HC RX MED GY IP 250 OP 250 PS 637: Performed by: STUDENT IN AN ORGANIZED HEALTH CARE EDUCATION/TRAINING PROGRAM

## 2023-08-15 PROCEDURE — 120N000005 HC R&B MS OVERFLOW UMMC

## 2023-08-15 PROCEDURE — 36415 COLL VENOUS BLD VENIPUNCTURE: CPT | Performed by: PHYSICIAN ASSISTANT

## 2023-08-15 PROCEDURE — 250N000013 HC RX MED GY IP 250 OP 250 PS 637: Performed by: PHYSICIAN ASSISTANT

## 2023-08-15 RX ORDER — GADOBUTROL 604.72 MG/ML
0.1 INJECTION INTRAVENOUS ONCE
Status: COMPLETED | OUTPATIENT
Start: 2023-08-15 | End: 2023-08-15

## 2023-08-15 RX ADMIN — OXYCODONE HYDROCHLORIDE 15 MG: 10 TABLET ORAL at 08:50

## 2023-08-15 RX ADMIN — ACETAMINOPHEN 650 MG: 325 TABLET, FILM COATED ORAL at 15:07

## 2023-08-15 RX ADMIN — OXYCODONE HYDROCHLORIDE 10 MG: 10 TABLET ORAL at 15:07

## 2023-08-15 RX ADMIN — POLYETHYLENE GLYCOL 3350 17 G: 17 POWDER, FOR SOLUTION ORAL at 08:18

## 2023-08-15 RX ADMIN — MAGNESIUM HYDROXIDE 30 ML: 400 SUSPENSION ORAL at 15:08

## 2023-08-15 RX ADMIN — SENNOSIDES AND DOCUSATE SODIUM 1 TABLET: 8.6; 5 TABLET ORAL at 08:18

## 2023-08-15 RX ADMIN — GADOBUTROL 7.94 ML: 604.72 INJECTION INTRAVENOUS at 20:15

## 2023-08-15 RX ADMIN — FENTANYL 1 PATCH: 50 PATCH TRANSDERMAL at 08:16

## 2023-08-15 RX ADMIN — ENOXAPARIN SODIUM 40 MG: 40 INJECTION SUBCUTANEOUS at 21:53

## 2023-08-15 RX ADMIN — OXYCODONE HYDROCHLORIDE 15 MG: 10 TABLET ORAL at 04:01

## 2023-08-15 RX ADMIN — CEFAZOLIN 1 G: 1 INJECTION, POWDER, FOR SOLUTION INTRAMUSCULAR; INTRAVENOUS at 08:17

## 2023-08-15 RX ADMIN — PANTOPRAZOLE SODIUM 40 MG: 40 TABLET, DELAYED RELEASE ORAL at 08:18

## 2023-08-15 RX ADMIN — SODIUM CHLORIDE, POTASSIUM CHLORIDE, SODIUM LACTATE AND CALCIUM CHLORIDE: 600; 310; 30; 20 INJECTION, SOLUTION INTRAVENOUS at 02:48

## 2023-08-15 ASSESSMENT — ACTIVITIES OF DAILY LIVING (ADL)
ADLS_ACUITY_SCORE: 45
ADLS_ACUITY_SCORE: 47
ADLS_ACUITY_SCORE: 45

## 2023-08-15 NOTE — PROGRESS NOTES
Orthopaedic Surgery Progress Note 08/15/2023    S: OR yesterday. Intermittently confused overnight. Otherwise ROXIE. Pain well controlled. Tolerating diet. POC reviewed.    O:  Temp: 98.4  F (36.9  C) Temp src: Oral BP: 138/66 Pulse: 93   Resp: 16 SpO2: 95 % O2 Device: Nasal cannula Oxygen Delivery: 2 LPM    Exam:  Gen: No acute distress, resting comfortably in bed.  Resp: Non-labored breathing  MSK:  LLE:  - Dressings c/d/i  - SILT femoral/tibial/sural/saphenous/DP/SP nerves  - Fires Quad, TA, EHL, FHL, GaSC  - PT/DP pulses 2+, foot wwp    Recent Labs   Lab 08/15/23  0612 08/14/23  0830 08/13/23  0907   WBC 8.6 9.6 10.3   HGB 11.2* 14.0 13.1*    226 198       Assessment/Plan: Sarbjit Swain is a 43 year old male with PMH significant for BRCA1 genotype, medulloblastoma at age 4 s/p  shunt (treated here at [facility]),  rectal cancer diagnosed around 2 years ago s/p hemicolectomy, who presents with pathologic left femoral neck and acetabulum fracture with workup revealing 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 Avila.     Patient discussed with medicine and oncology teams. Patient does have an oncologist  in [place]. Will discuss whether patient prefers oncologic treatment here or in [place]. If he elects to pursue treatment at [facility], will transfer to [place] for further onc w/u and treatment. Otherwise could dc home for cancer treatment in [place] in coming days.     - Primary: Ortho for now; if transfers to [place], Medicine will take over as primary team.   - Anticoagulation/DVT Prophylaxis: Lovenox 40mg qd  - Antibiotics/Tetanus: Ancef perioperatively  - X-rays/Imaging: MRI pelvis with and without contrast  - Bracing/Splinting: None  - Elevation: None  - Activity: bed to chair  - Weight bearing: TTWB LLE for transfers  - Pain control: Use all oral meds first then escalate to IV, as ordered  - Diet: ADAT  - Cultures: None  -  Consults Appreciated: Medicine, Oncology, Pathology  - Disposition: TBD  - Follow-up: TBKRISTYN Bethea MD  Orthopaedic Surgery, PGY-4  Pager: [phone]

## 2023-08-15 NOTE — PLAN OF CARE
"Goal Outcome Evaluation:  VS: /59 (BP Location: Left arm)   Pulse (!) 123   Temp 98.3  F (36.8  C) (Oral)   Resp 18   Ht 1.6 m (5' 3\")   Wt 79.4 kg (175 lb)   SpO2 95%   BMI 31.00 kg/m      O2: O2 SATS >90% denies chest pain,  or SBO   Output: Voids adequately without difficulty using urinal    Last BM: 8/12/23  BS present all x4 quadrants    Activity: Not oob this shift  repositions self in bed    Up for meals? N/A    Skin: Intact except  L hip surgical incision    Pain: Pain managed with prn oxycodone    CMS: Intermittent confusion Denies numbness and tingling   Dressing: CDI   Diet: Regular diet    LDA: L PIV infusing LR  125ml/hr    Equipment: IV Pole personal belongings   Plan: Continue to monitor  and update,    Additional Info:                              "

## 2023-08-15 NOTE — PROGRESS NOTES
08/15/23 0953   Appointment Info   Signing Clinician's Name / Credentials (PT) Cindy Regalado DPT       Present no   Language English   Living Environment   People in Home spouse;child(pauline), dependent   Current Living Arrangements house   Home Accessibility stairs to enter home;stairs within home   Number of Stairs, Main Entrance 2   Stair Railings, Main Entrance none   Number of Stairs, Within Home, Primary four   Stair Railings, Within Home, Primary railings safe and in good condition;railing on left side (ascending)   Transportation Anticipated family or friend will provide   Self-Care   Usual Activity Tolerance good   Current Activity Tolerance fair   Regular Exercise No   Equipment Currently Used at Home none  (owns crutches and w/c)   Fall history within last six months yes   Number of times patient has fallen within last six months 2   General Information   Onset of Illness/Injury or Date of Surgery 08/14/23   Referring Physician Bon Leach MD   Patient/Family Therapy Goals Statement (PT) To get back to work at some point   Pertinent History of Current Problem (include personal factors and/or comorbidities that impact the POC) 43 year old male with PMH significant for BRCA1 genotype, medulloblastoma at age 4 s/p  shunt (treated here at [facility]),  rectal cancer diagnosed around 2 years ago s/p hemicolectomy, who presents with pathologic left femoral neck and acetabulum fracture with workup revealing 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 Avila.   Existing Precautions/Restrictions fall   Weight-Bearing Status - LUE full weight-bearing   Weight-Bearing Status - RUE full weight-bearing   Weight-Bearing Status - LLE weight-bearing as tolerated   Weight-Bearing Status - RLE full weight-bearing   General Observations Supine in bed upon arrival, agreeable to PT   Cognition   Affect/Mental Status (Cognition) WNL    Orientation Status (Cognition) oriented x 3   Follows Commands (Cognition) WNL   Pain Assessment   Patient Currently in Pain Yes, see Vital Sign flowsheet   Integumentary/Edema   Integumentary/Edema Comments Patient with normal post-surgical swelling present to L LE   Posture    Posture Forward head position;Protracted shoulders;Kyphosis   Posture Comments Significant sitting EOB and standing   Range of Motion (ROM)   ROM Comment Did not formally assess, demonstrates functional ROM with mobility   Strength (Manual Muscle Testing)   Strength Comments Did not formally assess, demonstrates functional strength with mobility   Bed Mobility   Comment, (Bed Mobility) Completes supine<>sit transfer with modA to L LE only   Transfers   Comment, (Transfers) Completes sit<>stand transfer with Nila and use of walker, significant cues for TTWB provided   Gait/Stairs (Locomotion)   Comment, (Gait/Stairs) Not safe to attempt given inability to maintain TTWB L LE with transfers   Balance   Balance Comments Supervision sitting balance, Nila for standing balance with UE support from walker   Sensory Examination   Sensory Perception WNL   Clinical Impression   Criteria for Skilled Therapeutic Intervention Yes, treatment indicated   PT Diagnosis (PT) impaired functional mobility   Influenced by the following impairments increased post-op pain, precautions, decreased L LE strength and ROM   Functional limitations due to impairments impaired bed mobility, transfers and ambulation   Clinical Presentation (PT Evaluation Complexity) Stable/Uncomplicated   Clinical Presentation Rationale Per clinical judgment   Clinical Decision Making (Complexity) low complexity   Planned Therapy Interventions (PT) balance training;bed mobility training;gait training;home exercise program;stair training;strengthening;transfer training;progressive activity/exercise;risk factor education;home program guidelines   Anticipated Equipment Needs at Discharge  (PT)   (will likely need walker)   Risk & Benefits of therapy have been explained evaluation/treatment results reviewed;care plan/treatment goals reviewed;risks/benefits reviewed;current/potential barriers reviewed   PT Total Evaluation Time   PT Eval, Low Complexity Minutes (80471) 7   Physical Therapy Goals   PT Frequency Daily   PT Predicted Duration/Target Date for Goal Attainment 08/22/23   PT Goals Bed Mobility;Transfers;Gait;Stairs   PT: Bed Mobility Supervision/stand-by assist;Supine to/from sit   PT: Transfers Supervision/stand-by assist;Sit to/from stand;Bed to/from chair;Assistive device;Within precautions   PT: Gait Supervision/stand-by assist;Assistive device;100 feet;Within precautions   PT: Stairs Minimal assist;Assistive device;Within precautions;4 stairs;Rail on left  (descending)   Therapeutic Procedure/Exercise   Ther. Procedure: strength, endurance, ROM, flexibillity Minutes (94703) 10   Symptoms Noted During/After Treatment fatigue;increased pain   Treatment Detail/Skilled Intervention PT: Completed supine exercises on L x 10 for strength and circulation including AP, QS, GS, HS, heelslides (assist). Fair tolerance overall with moderate quality contractions   Therapeutic Activity   Therapeutic Activities: dynamic activities to improve functional performance Minutes (03464) 17   Symptoms Noted During/After Treatment Fatigue;Increased pain   Treatment Detail/Skilled Intervention PT: Supine in bed upon arrival, agreeable to PT. Educated on TTWB L LE prior to mobility. Increased time needed for set up and line management. Patient then completes supine to sit transfer with Nila to L LE only. Sat EOB with fairly good tolerance, BP stable. Patient agreeable to try to stand. Cues for scooting forward. Patient then completes sit<>stand transfer from slightly elevated bed with Nila using walker, significant difficulty maintaining TTWB throughout. Initially completed static standing with near constant  cues. Attempted to take a few steps up towards HOB with Nila using walker, again significant difficulty maintaining TTWB throughout. Decision made to return to bed to avoid increased pressure through L LE. Patient then completes sit to supine transfer with maxA to L LE only. Ended in bed with needs in reach.   PT Discharge Planning   PT Plan PT: Continue functional mobility, attempt transfer up to chair (with chair alarm if family not present), safety with standing at walker, LE strengthening as able   PT Discharge Recommendation (DC Rec) Transitional Care Facility   PT Rationale for DC Rec PT: patient below baseline level of function and increased difficulty noted with TTWB precaution. Recommend d/c to rehab to progress safety and independence with functional mobility prior to returning home   PT Brief overview of current status PT: Assist to L LE for bed mobility, Nila for sit<>stands with walker. Significant difficulty maintaining TTWB throughout   Total Session Time   Timed Code Treatment Minutes 27   Total Session Time (sum of timed and untimed services) 34

## 2023-08-15 NOTE — PROGRESS NOTES
"[facility]    Medicine Progress Note - Hospitalist Service, GOLD TEAM 18    Date of Admission:  8/10/2023    Assessment & Plan   Sarbjit Swain is a 42 yo gentleman admitted on 8/10/2023.  He has a past medical history significant for Greene's esophagus, BPH, medulloblastoma s/p  shunt, rectal cancer diagnosed 2021 s/p hemicolectomy and capecitabine partial therapy (discontinued due to an chest tightness reaction) and new lesion in the left ilium (showing spindle cell tumor type on biopsy).  Per oncology consult note, \"recent path showing osteosarcoma and PET CT concerning for metastatic disease to lungs.  Mr. Swain sustained pathologic left femoral neck and acetabulum fracture on 8/9/23 while crawling at his home.  Transferred from [place] to [facility] ED for ortho consultation.  Admitted to Ortho service and hospitalist medicine service consulted for medical co management     #  Acute pathologic left femoral neck and acetabulum fracture  ---   Recently diagnosed osteosarcoma and PET CT concerning for metastatic disease to lungs   ---   Suffered pathologic fracture on 8/9 while crawling around his home.   ---   Transferred from [place] for Orthopedics team consultation and likely procedure.   ---   S/p closed reduction percutaneous pinning (CRPP) L hip on 8/14/23 with Dr. Avila.   ---   TTWB LLE for transfers   ---   Ortho ordered MRI pelvis with and without contrast   ---   Transfer pt to [place] for inpatient chemotherapy initiation and to consult interventional pulmonology/radiology for biopsy of pulmonary lesions  ---   Pt is accepted by [facility] to be transferred to medicine team on 8/14 w/ ortho and oncology services following as a consult    Sinus tachycardia  ---   Probably physiologic response to uncontrolled pain  ---   Better today  ---   May use BBC or CCB as needed    Acute encephalopathy  ---   Intermittently confused, mostly at " night  ---   No evidence of infection  ---   Will consider obtaining CNS imaging study to rule out intracranial metastasis if confusion persists  ---   MS clear this am  ---   Continue close monitoring    Hyponatremia  ---   Na level 132  ---   Suspect due to hypovolemia  ---   S/p LR one liter bolus followed by 125 ml/hr     Acute on chronic cancer related pain  ---   PTA on fentanyl patch plus oxycodone as needed.  ---   Continue fentanyl patch - would remove prior to surgery  ---   Continue Tylenol, robaxin, oxycodone, IV dilaudid     Stage II colorectal cancer s/p hemicolectomy now s/p re-anastomosis   ---   Follows with Dr. Palma at [facility], last seen 8/7. Dx in 7/2021.   ---   Underwent hemicolectomy 9/2021 followed by re-anastomosis in 12/2021.  ---   Follow up outpatient      Hx of medulloblastoma s/p  shunt   ---   At age 4.   ---   Underwent craniotomy with resection follow by radiation and chemotherapy.   ---   Has stable right sided brain masses c/w meningiomas being followed by Neurosurgery.      BRCA1 positive   ---   Being followed by Oncology for hx of cancer.      Hx of hypothyroidism  ---   Noted in chart. Per review, no abnormal TSH/T4 to fit with diagnosis.   ---   Not on pta medication        Diet: Regular Diet Adult    DVT Prophylaxis:  Enoxaparin  Bran Catheter: Not present  Lines: None     Cardiac Monitoring: None  Code Status: Full Code    Expected Discharge Date:  TBD                Rebecca Killian MD  Hospitalist Service, GOLD TEAM 18  [facility]  Securely message with [employer] (more info)  Text page via [employer] Paging/Directory   See signed in provider for up to date coverage information  ______________________________________________________________________    Interval History   No complaints  Wife and parents at bedside  Pain under better control today  MS clear when seen this am    Physical Exam   Vital Signs: Temp: 98.4  F (36.9   C) Temp src: Oral BP: 138/66 Pulse: 93   Resp: 16 SpO2: 95 % O2 Device: Nasal cannula Oxygen Delivery: 2 LPM  Weight: 175 lbs 0 oz  General Appearance: Awake. Alert and oriented x4. Laying in bed. Appears uncomfortable, no acute distress.  Eyes: Normal lids. Anicteric sclera. Pupils equal and round.  HEENT: Normocephalic. Nares patent. Mucous membranes moist.  Respiratory: Normal work of breathing on room air. Lungs CTAB. No wheezes.  Cardiovascular: Tachycardic, nl s1 and s2, no m/r/g. Pedal pulses 2+  GI: Non-distended abdomen. Normoactive. Soft.  Lymph/Hematologic: No abnormal or excessive bruising on exposed skin.  Skin: Warm, dry. No rashes on exposed skin.   Musculoskeletal: Moves upper extremities freely.  Neurologic: CN II-XII grossly intact.         Data     I have personally reviewed the following data over the past 24 hrs:    8.6  \   11.2 (L)   / 215     N/A N/A N/A /  N/A   N/A N/A N/A \       Imaging results reviewed over the past 24 hrs:   Recent Results (from the past 24 hour(s))   XR Surgery YARELI L/T 5 Min Fluoro    Narrative    This exam was marked as non-reportable because it will not be read by a   radiologist or a [facility] non-radiologist provider.

## 2023-08-15 NOTE — PROGRESS NOTES
BRIEF ONCOLOGY NOTE      This patient was not seen by the oncology team today. Chart and labs were reviewed. Patient was discussed with primary team.     Patient discussed with orthopedic surgery team and primary hospitalist team. Patient underwent surgical fixation of left hip with orthopedic surgery on 8/14. He and his family prefer to initiate chemotherapy here rather than in [place]. A biopsy of his metastatic pulmonary lesions will also be required.     Recommendations  - transfer to [place] when bed available for inpatient chemotherapy initiation  - after transfer, primary team should consult interventional pulmonology/radiology for biopsy of pulmonary lesions    Patient discussed with staff oncologist, Dr. Vigil.    We will continue to follow the patient peripherally and are available for any goals of care discussions. Please do not hesitate to reach out if there are questions/concerns in the mean time.     Maryan Tyler MD  Internal Medicine PGY2

## 2023-08-15 NOTE — PLAN OF CARE
VS: VSS, pt denied CP or SOB.   O2: Room air sat. > 90%.   Output: Voiding adequate amount using urinal.    Last BM: 08/15/21   Activity: Up at the age of bed with PT.    Skin: Intact except surgical incision.    Pain: Comfortably manageable with PRN pain medication.   Neuro: CMS and neuro intact to baseline, pt confused verblized some visual and auditory hallucination MD aware.   Dressing: CDI.    Diet: Regular tolerating okay.    LDA: PIV SL. .   Equipment: IV pole, walker and personal belongings.    Plan: Pt is going to transfer to [facility] [address] oncology services this evening per provide order.   Additional Info: On bed alarm for safety.

## 2023-08-16 ENCOUNTER — APPOINTMENT (OUTPATIENT)
Dept: CARDIOLOGY | Facility: CLINIC | Age: 43
End: 2023-08-16
Attending: STUDENT IN AN ORGANIZED HEALTH CARE EDUCATION/TRAINING PROGRAM
Payer: COMMERCIAL

## 2023-08-16 ENCOUNTER — APPOINTMENT (OUTPATIENT)
Dept: CT IMAGING | Facility: CLINIC | Age: 43
End: 2023-08-16
Attending: INTERNAL MEDICINE
Payer: COMMERCIAL

## 2023-08-16 ENCOUNTER — APPOINTMENT (OUTPATIENT)
Dept: PHYSICAL THERAPY | Facility: CLINIC | Age: 43
End: 2023-08-16
Attending: STUDENT IN AN ORGANIZED HEALTH CARE EDUCATION/TRAINING PROGRAM
Payer: COMMERCIAL

## 2023-08-16 ENCOUNTER — APPOINTMENT (OUTPATIENT)
Dept: CT IMAGING | Facility: CLINIC | Age: 43
End: 2023-08-16
Attending: STUDENT IN AN ORGANIZED HEALTH CARE EDUCATION/TRAINING PROGRAM
Payer: COMMERCIAL

## 2023-08-16 ENCOUNTER — PREP FOR PROCEDURE (OUTPATIENT)
Dept: PULMONOLOGY | Facility: CLINIC | Age: 43
End: 2023-08-16
Payer: COMMERCIAL

## 2023-08-16 DIAGNOSIS — R91.8 PULMONARY NODULES: Primary | ICD-10-CM

## 2023-08-16 LAB
ATRIAL RATE - MUSE: 142 BPM
BI-PLANE LVEF ECHO: NORMAL
DIASTOLIC BLOOD PRESSURE - MUSE: NORMAL MMHG
ERYTHROCYTE [DISTWIDTH] IN BLOOD BY AUTOMATED COUNT: 14.1 % (ref 10–15)
HCT VFR BLD AUTO: 34.9 % (ref 40–53)
HGB BLD-MCNC: 11.3 G/DL (ref 13.3–17.7)
HOLD SPECIMEN: NORMAL
INTERPRETATION ECG - MUSE: NORMAL
MCH RBC QN AUTO: 28 PG (ref 26.5–33)
MCHC RBC AUTO-ENTMCNC: 32.4 G/DL (ref 31.5–36.5)
MCV RBC AUTO: 87 FL (ref 78–100)
P AXIS - MUSE: 50 DEGREES
PLATELET # BLD AUTO: 253 10E3/UL (ref 150–450)
PR INTERVAL - MUSE: 124 MS
QRS DURATION - MUSE: 74 MS
QT - MUSE: 276 MS
QTC - MUSE: 424 MS
R AXIS - MUSE: 76 DEGREES
RBC # BLD AUTO: 4.03 10E6/UL (ref 4.4–5.9)
SYSTOLIC BLOOD PRESSURE - MUSE: NORMAL MMHG
T AXIS - MUSE: 49 DEGREES
VENTRICULAR RATE- MUSE: 142 BPM
WBC # BLD AUTO: 6.9 10E3/UL (ref 4–11)

## 2023-08-16 PROCEDURE — 99223 1ST HOSP IP/OBS HIGH 75: CPT | Mod: GC | Performed by: INTERNAL MEDICINE

## 2023-08-16 PROCEDURE — 250N000013 HC RX MED GY IP 250 OP 250 PS 637: Performed by: STUDENT IN AN ORGANIZED HEALTH CARE EDUCATION/TRAINING PROGRAM

## 2023-08-16 PROCEDURE — 93306 TTE W/DOPPLER COMPLETE: CPT | Mod: 26 | Performed by: INTERNAL MEDICINE

## 2023-08-16 PROCEDURE — 71250 CT THORAX DX C-: CPT

## 2023-08-16 PROCEDURE — 71250 CT THORAX DX C-: CPT | Mod: 26 | Performed by: RADIOLOGY

## 2023-08-16 PROCEDURE — 70450 CT HEAD/BRAIN W/O DYE: CPT | Mod: 26 | Performed by: RADIOLOGY

## 2023-08-16 PROCEDURE — 97530 THERAPEUTIC ACTIVITIES: CPT | Mod: GP

## 2023-08-16 PROCEDURE — 99253 IP/OBS CNSLTJ NEW/EST LOW 45: CPT | Mod: GC | Performed by: PHYSICAL MEDICINE & REHABILITATION

## 2023-08-16 PROCEDURE — 250N000013 HC RX MED GY IP 250 OP 250 PS 637: Performed by: PHYSICIAN ASSISTANT

## 2023-08-16 PROCEDURE — 70450 CT HEAD/BRAIN W/O DYE: CPT

## 2023-08-16 PROCEDURE — 99223 1ST HOSP IP/OBS HIGH 75: CPT | Mod: 24 | Performed by: INTERNAL MEDICINE

## 2023-08-16 PROCEDURE — 255N000002 HC RX 255 OP 636: Performed by: INTERNAL MEDICINE

## 2023-08-16 PROCEDURE — 36415 COLL VENOUS BLD VENIPUNCTURE: CPT | Performed by: PHYSICIAN ASSISTANT

## 2023-08-16 PROCEDURE — 999N000208 ECHOCARDIOGRAM COMPLETE

## 2023-08-16 PROCEDURE — 120N000005 HC R&B MS OVERFLOW UMMC

## 2023-08-16 PROCEDURE — 99233 SBSQ HOSP IP/OBS HIGH 50: CPT | Mod: GC | Performed by: STUDENT IN AN ORGANIZED HEALTH CARE EDUCATION/TRAINING PROGRAM

## 2023-08-16 PROCEDURE — 85027 COMPLETE CBC AUTOMATED: CPT | Performed by: PHYSICIAN ASSISTANT

## 2023-08-16 RX ADMIN — ACETAMINOPHEN 650 MG: 325 TABLET, FILM COATED ORAL at 03:27

## 2023-08-16 RX ADMIN — SENNOSIDES AND DOCUSATE SODIUM 1 TABLET: 8.6; 5 TABLET ORAL at 19:36

## 2023-08-16 RX ADMIN — OXYCODONE HYDROCHLORIDE 10 MG: 10 TABLET ORAL at 10:14

## 2023-08-16 RX ADMIN — PANTOPRAZOLE SODIUM 40 MG: 40 TABLET, DELAYED RELEASE ORAL at 08:34

## 2023-08-16 RX ADMIN — METHOCARBAMOL 750 MG: 750 TABLET ORAL at 20:43

## 2023-08-16 RX ADMIN — HUMAN ALBUMIN MICROSPHERES AND PERFLUTREN 5 ML: 10; .22 INJECTION, SOLUTION INTRAVENOUS at 15:50

## 2023-08-16 RX ADMIN — ACETAMINOPHEN 650 MG: 325 TABLET, FILM COATED ORAL at 16:49

## 2023-08-16 ASSESSMENT — ACTIVITIES OF DAILY LIVING (ADL)
ADLS_ACUITY_SCORE: 47

## 2023-08-16 NOTE — PROGRESS NOTES
On call provider (Cherri GILLETTE) notified via [employer] about pt being tachycardic 120's  Apical count correlates with pulse oximetry

## 2023-08-16 NOTE — CONSULTS
[facility]     PM&R CONSULT        Consulting Provider: Ree Mckay MD  Reason for Consult: Assessment of rehabilitation   Location of Patient: 5401  Date of Encounter: 8/16/2023   Date of Admission: 8/10/2023    ASSESSMENT/PLAN:    Mr. 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     Deficits include:   - Impairments in ADLs/iADLs  - Impairments in mobility, bed mobility, gait, coordination & endurance  - Impairments in safety awareness  - Impairments in cognition, processing with recent hallucinations  - All related to patient's debility from recent cancer diagnosis and pathologic fracture and associated recent surgery     Physical Medicine and Rehabilitation have been consulted to evaluate patient for rehabilitation needs/discharge planning.    Recommendations:  - Disposition: TCU  - Estimated length of stay:  3-4 weeks.  - Rehab prognosis:  Fair  - Additional Comments:    - Precautions clarifications: none   - Medical therapy clarifications: see below   - Follow up clarifications: none    - Recommend completing biopsy of metastatic lesion and determining definitive chemotherapy plan. (update: tomorrow = biopsy). Recommend completing all other workup such as TTE prior to potential chemotherapy as noted by oncology note on 8/16 prior to discharge.    - Possible mets to lung. Per pulm consult note on 8/16 - biopsy planned for outpatient.     - New onset confusion/hallucinations since Monday. CT head ordered for 8/16, pending results-> update: negative for acute process.   Agree with  delirium precautions. Likely multi-factorial - medication related, hospital delirium, and most significantly poor sleep as of late. Recommend initiating trazodone 25 mg at bedtime and can increase to 50 mg in coming days as tolerated to help with sleep.     - Currently on PTA fentanyl patch w/ PRN Tylenol + PRN Oxycodone for acute on chronic cancer related pain. Feels pain is slowly improving.    Concern that patient will not tolerate intensity of acute inpatient rehabilitation with chemotherapy likely starting after biopsy is completed tomorrow-already with very limited active participation in therapies and feel patient will be more fatigued with initiation of chemotherapy.    At this time patient is not appropriate for acute inpatient rehabilitation as current level of function and functional needs can be met at a lower level of care. Recommend TCU for patient, as patient still is below functional baseline in regards to ADLs, gait, mobility.    Thank you for this consult. Please call for any questions.     Hakan Cooney DO  PGY3 - PM&R Resident   Physical Medicine & Rehabilitation  Pager: [phone]    Patient was seen and discussed with staff attending, Dr. Inocencia Valentine MD     HPI:    Mr. 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     Mr. Swain sustained pathologic left femoral neck and acetabulum fracture on 8/9/23 while crawling at his home. Transferred from [place] to [facility] ED for ortho evaluation, now status post ORIF of L hip by ortho on 8/14.     Patient's hospital course complicated by newly  found pulmonary lesions concerning for mets- Per pulm consult note on 8/16 - biopsy planned for outpatient. Also complicated by new onset acute encephalopathy with new auditory and visual hallucinations since Monday.     Patient seen at the bedside today. Patient accompanied by wife and family member. Patient states he is doing okay, states he is very fatigued. States he has not been sleeping well due to frequent interruptions and pain. Patient states he has not had any significant hallucinations today- family feels these have improved today and feels he is a little more oriented than he has been in last few days. Still with 7/10 pain in his L leg. No concerns for bowel or bladder.     NURSING REPORT:  Per nursing on 8/16 - Pt noted to be intermittently confused throughout the night. Good PO intake. LLE pain, managed with PRNs.     CURRENT PRECAUTIONS/RESTRICTIONS:  TTWB LLE    DVT PPX:  LVX 40 qd    PREVIOUS LEVEL OF FUNCTION:  Prior to start of leg pain, patient was independent with mobility, ADLs/IADLs, gait, transfers with no assistive device ,then was using crutches in June, then progressed to wheelchair due to pain by July.       CURRENT FUNCTION: Per most recent therapy note:     PT: Per PT note on 8/15;  Nila for sit<>stands with walker. Significant difficulty maintaining TTWB throughout (poor safety awareness)     OT: Per OT note on 8/15: Sat EOB with fairly good tolerance, BP stable. Patient agreeable to try to stand. Cues for scooting forward. Patient then completes sit<>stand transfer from slightly elevated bed with Nila using walker, significant difficulty maintaining TTWB throughout. Initially completed static standing with near constant cues. Attempted to take a few steps up towards HOB with Nila using walker, again significant difficulty maintaining TTWB throughout     SLP: none    LIVING SITUATION/SUPPORT:    Living Situation: Lives with wife in 3 level split home at [address] to get to  "basement where he would be able to live.     Vocational History: not currently working    Support: wife, a lot of family nearby.        EXAMINATION:  Vital signs:  Temp: 98.2  F (36.8  C) Temp src: Axillary BP: (!) 141/89 Pulse: (!) 122   Resp: 18 SpO2: 95 % O2 Device: None (Room air) Oxygen Delivery: 1.5 LPM Height: 160 cm (5' 3\") Weight: 74.7 kg (164 lb 11.2 oz)  Estimated body mass index is 29.18 kg/m  as calculated from the following:    Height as of this encounter: 1.6 m (5' 3\").    Weight as of this encounter: 74.7 kg (164 lb 11.2 oz).    Gen: No acute distress, cooperative, fatigued.   CV: Regular rate, rhythm, no murmurs.  Respiratory: CTAB, no wheezing, crackles or rhonchi. Breathing comfortably on room air.  Abd: Bowel sounds present. Soft, non-distended, non-tender to palpation in four quadrants.  Extremities: Calves warm, soft, non-tender bilaterally.  Skin: No signs of trauma / abrasions on exposed skin.  Neuromuscular: Speech fluent & comprehensible.    Cranial Nerves: grossly normal    Sensory: Normal to light touch in bilateral upper and lower extremities     Motor:                                 Right                          Left  Shoulder abd                   5/5                          5/5  Elbow Flexion                  5/5                          5/5  Elbow Extension              5/5                          5/5  Wrist Extension               5/5                          5/5  Wrist Flexion                    5/5                          5/5  FDP                                  5/5                          5/5  Abd dig. Minimi                 5/5                          5/5    Hip Flexion                        5/5                          2/5  Knee Extension               5/5                          2/5  Knee Flexion                   5/5                          2/5  Dorsiflexion                     5/5                          4/5  EHL                                 5/5                "           4/5  Plantarflexion                   5/5                          4/5     Reflexes: Normal bilateral reflexes for B,BR,T, P, A reflexes    Brock's test: negative bilaterally    Babinski reflex: normal downgoing bilaterally    Tone per Modified Aurelio Scale: no concerns   Abnormal movements: None    Coordination: No dysmetria on finger to nose b/l       LABS AND IMAGING:  Results for orders placed or performed during the hospital encounter of 08/10/23 (from the past 24 hour(s))   UA with Microscopic reflex to Culture    Specimen: Urine, Clean Catch   Result Value Ref Range    Color Urine Yellow Colorless, Straw, Light Yellow, Yellow    Appearance Urine Clear Clear    Glucose Urine Negative Negative mg/dL    Bilirubin Urine Negative Negative    Ketones Urine Negative Negative mg/dL    Specific Gravity Urine 1.021 1.003 - 1.035    Blood Urine Negative Negative    pH Urine 5.5 5.0 - 7.0    Protein Albumin Urine 10 (A) Negative mg/dL    Urobilinogen Urine Normal Normal, 2.0 mg/dL    Nitrite Urine Negative Negative    Leukocyte Esterase Urine Negative Negative    Mucus Urine Present (A) None Seen /LPF    RBC Urine <1 <=2 /HPF    WBC Urine 1 <=5 /HPF    Hyaline Casts Urine 1 <=2 /LPF    Narrative    Urine Culture not indicated   MR Pelvis Bone wo & w Contrast    Narrative    EXAM: MRI PELVIS WITHOUT AND WITH IV CONTRAST  LOCATION: [facility]  DATE: 8/15/2023    INDICATION: Chondrosarcoma involving pelvis and has rapidly expanded into left hip; evaluate extent of spread   COMPARISON: CT dated 08/09/2023..    TECHNIQUE: Routine MRI pelvis without and with IV contrast. Axial, sagittal, and coronal high-resolution T2 and post gadolinium T1 with fat saturation.   CONTRAST: 7.94 ml Gadavist    FINDINGS:     -There is a fracture of the left femoral neck status post fixation with multiple cannulated screws, susceptibility artifact from which degrades evaluation of  adjacent structures.    There is a large osseous lesion centered in the left acetabulum extending into the superior pubic ramus and into the left iliac wing, likely representing the reported biopsy-proven sarcoma. There is an associated soft tissue component to this lesion   which demonstrates cortical breakthrough along the acetabulum and superior pubic ramus extending into the adjacent iliopsoas, gluteus minimus, and adductor musculature.     There is additional soft tissue edema and enhancement surrounding the proximal left femur fracture extending into the adjacent quadriceps and gluteus prince muscle, and it is difficult to determine to what degree this represents postoperative change   versus additional tumor.    There is a 2.8 x 2.3 cm soft tissue mass between the rectus femoris and iliacus, just lateral to the femoral artery, likely a metastatic inguinal lymph node. There is an additional bulky metastatic left external iliac lymph node.    Left femoral neck is not well evaluated on this study due to artifact from the fixation screws but given the appearance on the CT from 08/09/2023, there is likely additional tumor within the proximal femoral head/neck.    Sacroiliac joints are intact. No pelvic free fluid is seen.      Impression    IMPRESSION:  1.  Large osseous lesion centered in the left acetabulum extending into the superior pubic ramus and into the left iliac wing, suggesting an aggressive neoplasm, likely the reported sarcoma. There is an associated soft tissue component to this lesion   which demonstrates cortical breakthrough through the acetabulum and superior pubic ramus into the adjacent iliopsoas, adductor, and gluteus minimus musculature. There are bulky inguinal and external iliac alec metastases.  2.  Status post cannulated screw fixation of a fracture of the left femoral neck, susceptibility artifact from which degrades evaluation of adjacent structures. The femoral neck is not well  evaluated due to susceptibility artifact but there is suspected   tumor within the proximal femoral head and neck given the lytic  appearance on the recent CT.   3.  There is additional soft tissue edema and enhancement surrounding proximal left femur extending into the adjacent quadriceps and gluteal musculature, and it is difficult to determine to what degree this represents postoperative change versus   additional tumor involvement.   CBC with platelets   Result Value Ref Range    WBC Count 6.9 4.0 - 11.0 10e3/uL    RBC Count 4.03 (L) 4.40 - 5.90 10e6/uL    Hemoglobin 11.3 (L) 13.3 - 17.7 g/dL    Hematocrit 34.9 (L) 40.0 - 53.0 %    MCV 87 78 - 100 fL    MCH 28.0 26.5 - 33.0 pg    MCHC 32.4 31.5 - 36.5 g/dL    RDW 14.1 10.0 - 15.0 %    Platelet Count 253 150 - 450 10e3/uL   Extra Tube    Narrative    The following orders were created for panel order Extra Tube.  Procedure                               Abnormality         Status                     ---------                               -----------         ------                     Extra Green Top (Lithium...[[id number]]                      Final result                 Please view results for these tests on the individual orders.   Extra Green Top (Lithium Heparin) Tube   Result Value Ref Range    Hold Specimen JIC            PAST MEDICAL HISTORY:  History reviewed. No pertinent past medical history.      CURRENT MEDICATIONS:  Current Facility-Administered Medications   Medication    acetaminophen (TYLENOL) tablet 650 mg    bisacodyl (DULCOLAX) suppository 10 mg    calcium carbonate (TUMS) chewable tablet 500 mg    enoxaparin ANTICOAGULANT (LOVENOX) injection 40 mg    fentaNYL (DURAGESIC) 50 mcg/hr 72 hr patch 1 patch    And    fentaNYL (DURAGESIC) Patch in Place    lactated ringers infusion    lidocaine (LMX4) cream    lidocaine 1 % 0.1-1 mL    magnesium hydroxide (MILK OF MAGNESIA) suspension 30 mL    methocarbamol (ROBAXIN) tablet 750 mg    naloxone (NARCAN)  injection 0.2 mg    Or    naloxone (NARCAN) injection 0.4 mg    Or    naloxone (NARCAN) injection 0.2 mg    Or    naloxone (NARCAN) injection 0.4 mg    ondansetron (ZOFRAN ODT) ODT tab 4 mg    Or    ondansetron (ZOFRAN) injection 4 mg    oxyCODONE IR (ROXICODONE) tablet 10 mg    Or    oxyCODONE (ROXICODONE) tablet 15 mg    pantoprazole (PROTONIX) EC tablet 40 mg    polyethylene glycol (MIRALAX) Packet 17 g    prochlorperazine (COMPAZINE) injection 10 mg    Or    prochlorperazine (COMPAZINE) tablet 10 mg    senna-docusate (SENOKOT-S/PERICOLACE) 8.6-50 MG per tablet 1 tablet    sodium chloride (PF) 0.9% PF flush 3 mL    sodium chloride (PF) 0.9% PF flush 3 mL

## 2023-08-16 NOTE — PLAN OF CARE
VSS.  Afebrile.  Bed alarm on.  Pt able to shift self in bed.  Pt noted to be intermittently confused throughout the night.  Tachycardic, on call provider aware.  Tylenol PRN for pain on LLE, noted to have received good results..  Good PO intake.  Good CMS noted on LLE.      Problem: Plan of Care - These are the overarching goals to be used throughout the patient stay.    Goal: Absence of Hospital-Acquired Illness or Injury  Intervention: Prevent Skin Injury  Recent Flowsheet Documentation  Taken 8/16/2023 0400 by Vin Jimenez RN  Body Position:   weight shifting   heels elevated  Taken 8/16/2023 0000 by Vin Jimenez RN  Body Position:   weight shifting   heels elevated     Problem: Plan of Care - These are the overarching goals to be used throughout the patient stay.    Goal: Absence of Hospital-Acquired Illness or Injury  Intervention: Identify and Manage Fall Risk  Recent Flowsheet Documentation  Taken 8/16/2023 0400 by Vin Jimenez RN  Safety Promotion/Fall Prevention:   clutter free environment maintained   increased rounding and observation   increase visualization of patient   lighting adjusted   nonskid shoes/slippers when out of bed   room near nurse's station  Taken 8/16/2023 0000 by Vin Jimenez RN  Safety Promotion/Fall Prevention:   clutter free environment maintained   increased rounding and observation   increase visualization of patient   lighting adjusted   nonskid shoes/slippers when out of bed   room near nurse's station     Problem: Plan of Care - These are the overarching goals to be used throughout the patient stay.    Goal: Absence of Hospital-Acquired Illness or Injury  Intervention: Prevent and Manage VTE (Venous Thromboembolism) Risk  Recent Flowsheet Documentation  Taken 8/16/2023 0400 by Vin Jimenez RN  VTE Prevention/Management: SCDs (sequential compression devices) on  Taken 8/16/2023 0000 by Vin Jimenez RN  VTE Prevention/Management: SCDs (sequential  compression devices) on     Problem: Plan of Care - These are the overarching goals to be used throughout the patient stay.    Goal: Optimal Comfort and Wellbeing  Intervention: Monitor Pain and Promote Comfort  Recent Flowsheet Documentation  Taken 8/16/2023 0327 by Vin Jimenez, RN  Pain Management Interventions: medication (see MAR)     Problem: Plan of Care - These are the overarching goals to be used throughout the patient stay.    Goal: Optimal Comfort and Wellbeing  Intervention: Provide Person-Centered Care  Recent Flowsheet Documentation  Taken 8/16/2023 0000 by Vin Jimenez, RN  Trust Relationship/Rapport:   care explained   emotional support provided   empathic listening provided   questions answered   reassurance provided   thoughts/feelings acknowledged   questions encouraged   choices provided

## 2023-08-16 NOTE — PLAN OF CARE
Neuro: A&Ox4.   Cardiac: Afebrile, VSS.   Respiratory: Sating above 95 on 1.5 to 2 LPM.  GI/: Adequate urine output. Small BM x1  Diet/appetite: Tolerating regular diet. Currently NPO  Activity:  Assist of 1-2, ceiling lift/pivot transfer   Pain: At acceptable level on current regimen.   Skin: No new deficits noted.  LDA's: PIV Saline locked  Plan: Continue with POC. Notify primary team with changes.  Goal Outcome Evaluation:      Plan of Care Reviewed With: patient

## 2023-08-16 NOTE — PROGRESS NOTES
Solid Tumor Oncology Consult Service  Progress Note   Date of Service: 08/16/2023  Patient: Sarbjit Swain  MRN: [id number]  Admission Date: 8/10/2023  Hospital Day # 6  Cancer Diagnosis: Chondroblastic osteosarcoma  Primary Outpatient Oncologist: Kee Palma at [facility]  Current Treatment Plan: Chemotherapy to be started pending lung biopsy     Summary & Recommendations:   - recommend obtaining biopsy of a metastatic lesion as soon as possible in order to guide chemotherapy decision. At this time, it is unclear if his metastatic disease is secondary to the osteosarcoma or one of his other malignancies  - once preliminary biopsy result is available, will plan to initiate inpatient chemotherapy  - recommend PM&R consult to evaluate if patient is appropriate for ARU. 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 He is now s/p internal fixation of his left hip with orthopedic surgery    The patient began experiencing left hip pain around April/May 2023. MRI of left hip on 6/30 revealed infiltrative metastatic lesion in the left acetabulum extending into the superior pubic ramus with associated nondisplaced acetabular fracture. Patient later had a PET on 7/21 that showed an FDG avid mass involving the left pelvis and proximal femur. PET also revealed multiple bilateral FDG avid intrapulmonary and subpleural nodules, favoring metastasis over primary pulmonary malignancy. Additionally, there was nonspecific FDG uptake along  "the gallbladder fundus and equivocal punctate focus of uptake in the subscapular left hepatic lobe. The patient underwent biopsy on 7/31 at [facility] and the biopsy later resulted as high-grade chondroblastic osteosarcoma. Meanwhile, the patient was transferred to the orthopedic service at [facility] from [facility] on 8/9 with a pathological subcapital fracture of the left femur with acetabular involvement; he is now s/p internal fixation of the hip on 8/14. Oncology was consulted for further management of what is suspected stage IV osteosarcoma.     Oncologic History:  History of medulloblastoma at age 4, treated with resection, whole brain radiation, and chemotherapy  Subsequently diagnosed with rectal cancer in 2021 with hemicolectomy and CAPOX for 3 months, which was discontinued due to side effects.  Now with new pathologic left hip fracture with biopsy showing high-grade chondroblastic osteosarcoma (biopsy 7/31/23).     Patient was seen and plan of care was discussed with attending physician Dr. Oates.    Thank you for the opportunity to partake in this patient's plan of care. Please do not hesitate to page with questions. We will continue to follow along.     Maryan Tyler MD  Internal Medicine PGY2  ___________________________________________________________________    Subjective & Interval History:    Patient transferred from [facility] to [place]. Patient's wife endorsing some confusion and auditory/visual hallucinations that have been ongoing for the past two days. Does have some baseline confusion. CT head ordered but still pending. Has some hip soreness as well, but reports that it is tolerable. Denies shortness of breath, cough, fevers, chills, abdominal pain.     Physical Exam:    Blood pressure (!) 141/89, pulse (!) 122, temperature 98.2  F (36.8  C), temperature source Axillary, resp. rate 18, height 1.6 m (5' 3\"), weight 74.7 kg (164 lb 11.2 oz), SpO2 95 %.    General: sitting up in bedside " chair, appears somewhat fatigued  HEENT: sclera anicteric, EOMI  CV: RRR, normal S1/S2, no m/r/g  Resp: CTAB, no wheezing/crackles, normal respiratory effort on ambient air  GI: soft, non-tender, non-distended, bowel sounds present and normoactive  MSK: warm and well-perfused, no lower extremity edema  Skin: no rashes on limited exam, no jaundice  Neuro: Alert and interactive, moves all extremities equally, no focal deficits    Labs & Studies: I personally reviewed the following studies:  ROUTINE LABS (Last four results):  CMP  Recent Labs   Lab 08/14/23  0830 08/14/23  0617 08/11/23  0702 08/10/23  1903   *  --  135* 138   POTASSIUM 4.2  --  4.0 4.1   CHLORIDE 93*  --  99 99   CO2 26  --  25 30*   ANIONGAP 13  --  11 9   GLC 97 103* 105* 93   BUN 13.8  --  19.5 21.3*   CR 0.87  --  1.00 1.05   GFRESTIMATED >90  --  >90 89   JESSICA 9.9  --  9.7 9.5   MAG 2.1  --   --   --    PROTTOTAL  --   --  6.8  --    ALBUMIN  --   --  3.6  --    BILITOTAL  --   --  1.5*  --    ALKPHOS  --   --  573*  --    AST  --   --  65*  --    ALT  --   --  18  --      CBC  Recent Labs   Lab 08/16/23  0647 08/15/23  0612 08/14/23  0830 08/13/23  0907   WBC 6.9 8.6 9.6 10.3   RBC 4.03* 3.91* 4.79 4.58   HGB 11.3* 11.2* 14.0 13.1*   HCT 34.9* 33.2* 40.6 38.5*   MCV 87 85 85 84   MCH 28.0 28.6 29.2 28.6   MCHC 32.4 33.7 34.5 34.0   RDW 14.1 13.7 13.4 13.4    215 226 198     INR  Recent Labs   Lab 08/10/23  1903   INR 1.10     Medications list for reference:  Current Facility-Administered Medications   Medication    acetaminophen (TYLENOL) tablet 650 mg    bisacodyl (DULCOLAX) suppository 10 mg    calcium carbonate (TUMS) chewable tablet 500 mg    enoxaparin ANTICOAGULANT (LOVENOX) injection 40 mg    fentaNYL (DURAGESIC) 50 mcg/hr 72 hr patch 1 patch    And    fentaNYL (DURAGESIC) Patch in Place    lactated ringers infusion    lidocaine (LMX4) cream    lidocaine 1 % 0.1-1 mL    magnesium hydroxide (MILK OF MAGNESIA) suspension 30 mL     methocarbamol (ROBAXIN) tablet 750 mg    naloxone (NARCAN) injection 0.2 mg    Or    naloxone (NARCAN) injection 0.4 mg    Or    naloxone (NARCAN) injection 0.2 mg    Or    naloxone (NARCAN) injection 0.4 mg    ondansetron (ZOFRAN ODT) ODT tab 4 mg    Or    ondansetron (ZOFRAN) injection 4 mg    oxyCODONE IR (ROXICODONE) tablet 10 mg    Or    oxyCODONE (ROXICODONE) tablet 15 mg    pantoprazole (PROTONIX) EC tablet 40 mg    polyethylene glycol (MIRALAX) Packet 17 g    prochlorperazine (COMPAZINE) injection 10 mg    Or    prochlorperazine (COMPAZINE) tablet 10 mg    senna-docusate (SENOKOT-S/PERICOLACE) 8.6-50 MG per tablet 1 tablet    sodium chloride (PF) 0.9% PF flush 3 mL    sodium chloride (PF) 0.9% PF flush 3 mL

## 2023-08-16 NOTE — PROGRESS NOTES
Patient admitted to: %C  Admitted from: [facility]  Arrived by: Ambulance  Reason for admission: start chemotherapy  Patient accompanied by: transport crew  Belongings: with patient  Teaching: plan of care, unit orientation  Skin double check completed by: Vin MAIER

## 2023-08-16 NOTE — PROGRESS NOTES
[facility]    Medicine Progress Note - Hospitalist Service, GOLD TEAM 7    Date of Admission:  8/10/2023    Assessment & Plan   Sarbjit Swain is a 43M with hx of Greene's esophagus, BPH, medulloblastoma s/p  shunt, rectal cancer diagnosed 2021 s/p hemicolectomy and capecitabine partial therapy (discontinued due to an chest tightness reaction) and new osteosarcoma of the left ilium who was initially admitted on 8/10 after a pathologic left femoral neck and acetabulum fracture and underwent repair with ortho on 8/14. Now transferred to [place] on medicine team for ongoing management and likely initiation of chemotherapy.    Hallucinations ( Auditory and Visual )   Acute Encephalopathy  Mentioned that he is having hallucination both auditory and visual since Monday this week, which is new for him. He has confusion which is his baseline.   DD: Delirium, Metabolic imbalance, Hypnagogic Hallucination,Infection, Brain Mets, Intracranial hemorhage, Drug  Most likely Delirium. No signs and symptoms of infection, has mild hyponatremia under treatment ( less likely). Has history of multiple cancers and stable brain mets.  --- CT head w/o contrast - Pending result  --- CTM mental status and metabolic status  --- Pain control  --- Avoid other hallucinogenic drug  --- Delirium precaution  --- PT/OT    Pulmonary Lesions with concern for METS  Recent PetCT showed he has multiple bilateral FDG avid intrapulmonary and subpleural nodules and favours met over primary pulm malignancy with intrapulmonary mets.   --- IP radiology consulted for biopsy tomorrow                  NPO overnight and Hold levonox                 CT chest   --- Oncology following: Onc wants urgent biopsy to plan for chemo.    Acute pathologic left femoral neck and acetabulum fracture  Chondroblastic Osteosarcoma  Recently diagnosed osteosarcoma and PET CT concerning for metastatic disease to lungs .Suffered  pathologic fracture on 8/9 while crawling around his home. Transferred from [place] for Orthopedics team consultation and likely procedure.  S/p closed reduction percutaneous pinning (CRPP) L hip on 8/14/23 with Dr. Avila.  TTWB LLE for transfers .Transfer pt to [facility] for inpatient chemotherapy initiation and to consult interventional pulmonology/radiology for biopsy of pulmonary lesions. Pt is accepted by SOC to be transferred to medicine team on 8/14 w/ ortho and oncology services following as a consult  --- CTM  --- Ortho following  --- Onco following: will start Chemo in this hospitalization - pending starting date from onco side                                TTE for baseline status before chemo                                 PMR for rehab consult after discharge  --- Fentanyl patch and oxycodone for pain    Sinus tachycardia  Most likely pain and stress with less sleep. No chest pain or SOB or palpitation or hypoxia. Will do further work up if continues to have sinus tachy with resolving of current concerns.  --- Pain med  --- Delirium precaution  --- CTM    Mild Hyponatremia  Most likely due to hypovolemia  Can also think of SIADH due to pain , stress, or mets, drug.  ---   S/p LR one liter bolus followed by 125 ml/hr     Acute on chronic cancer related pain  ---   PTA on fentanyl patch plus oxycodone as needed.  ---   Continue fentanyl patch and oxycodone     Hx of Multiple Cancers:      Stage II colorectal cancer s/p hemicolectomy now s/p re-anastomosis    Follows with Dr. Palma at [facility], last seen 8/7. Dx in 7/2021.  Underwent hemicolectomy 9/2021 followed by re-anastomosis in 12/2021.  ---   Follow up outpatient        Hx of medulloblastoma s/p  shunt   Diagnosed  At age 4. Underwent craniotomy with resection follow by radiation and chemotherapy.  Has stable right sided brain masses c/w meningiomas being followed by Neurosurgery.   --- will do a CT scan head today.        BRCA1 positive    Being followed by Oncology for hx of cancer.         Pulm lesions with concern for mets  As above       Chondroblastic Osteosarcoma  As above    Hx of hypothyroidism  Noted in chart. Per review, no abnormal TSH/T4 to fit with diagnosis. Not on pta medication      Diet: Regular Diet Adult    DVT Prophylaxis:  Held enoxaparin  Bran Catheter: Not present  Lines: None     Cardiac Monitoring: None  Code Status: Full Code    Expected Discharge Date:  TBD          Ree Mckay MD  Hospitalist Service, GOLD TEAM [address] [place] [address]  Securely message with [employer] (more info)  Text page via [facility] Paging/Directory   See signed in provider for up to date coverage information    Patient is seen by Dr. Steen  ______________________________________________________________________    Interval History   Mentioned that he has been having hallucinations ( both visual and auditory). He hears music. It can happen at any time. He has visual hallucinations mostly at night where he sees that he is at home. At that time, it was pitch black and he also mentioned that he needs light to orient himself. He feels confused intermittently which  is same at home, But the hallucinations are new for him. Has normal bowel and bladder. Noticed increased in frequency of urine but no dysuria and burning sensation. Wife and family at bedside. Pain under better control today.    Physical Exam   Vital Signs: Temp: 98.2  F (36.8  C) Temp src: Axillary BP: (!) 141/89 Pulse: (!) 122   Resp: 18 SpO2: 95 % O2 Device: None (Room air) Oxygen Delivery: 1.5 LPM  Weight: 164 lbs 11.2 oz  General Appearance: Awake. Alert and oriented x4. Laying in bed. Appears uncomfortable, no acute distress.  Eyes: Normal lids. Anicteric sclera. Pupils equal and round.  HEENT: Normocephalic. Nares patent. Mucous membranes moist.  Respiratory: Normal work of breathing on room air. Lungs CTAB. No wheezes.  Cardiovascular: Tachycardic, nl s1  and s2, no m/r/g. Pedal pulses 2+  GI: Non-distended abdomen. Normoactive. Soft.  Lymph/Hematologic: No abnormal or excessive bruising on exposed skin.  Skin: Warm, dry. No rashes on exposed skin.   Musculoskeletal: Moves upper extremities freely. Pain on palpation on the surgical site.  Neurologic: CN II-XII grossly intact.         Data     I have personally reviewed the following data over the past 24 hrs:    6.9  \   11.3 (L)   / 253     N/A N/A N/A /  N/A   N/A N/A N/A \       Imaging results reviewed over the past 24 hrs:   Recent Results (from the past 24 hour(s))   MR Pelvis Bone wo & w Contrast    Narrative    EXAM: MRI PELVIS WITHOUT AND WITH IV CONTRAST  LOCATION: [facility]  DATE: 8/15/2023    INDICATION: Chondrosarcoma involving pelvis and has rapidly expanded into left hip; evaluate extent of spread   COMPARISON: CT dated 08/09/2023..    TECHNIQUE: Routine MRI pelvis without and with IV contrast. Axial, sagittal, and coronal high-resolution T2 and post gadolinium T1 with fat saturation.   CONTRAST: 7.94 ml Gadavist    FINDINGS:     -There is a fracture of the left femoral neck status post fixation with multiple cannulated screws, susceptibility artifact from which degrades evaluation of adjacent structures.    There is a large osseous lesion centered in the left acetabulum extending into the superior pubic ramus and into the left iliac wing, likely representing the reported biopsy-proven sarcoma. There is an associated soft tissue component to this lesion   which demonstrates cortical breakthrough along the acetabulum and superior pubic ramus extending into the adjacent iliopsoas, gluteus minimus, and adductor musculature.     There is additional soft tissue edema and enhancement surrounding the proximal left femur fracture extending into the adjacent quadriceps and gluteus prince muscle, and it is difficult to determine to what degree this represents  postoperative change   versus additional tumor.    There is a 2.8 x 2.3 cm soft tissue mass between the rectus femoris and iliacus, just lateral to the femoral artery, likely a metastatic inguinal lymph node. There is an additional bulky metastatic left external iliac lymph node.    Left femoral neck is not well evaluated on this study due to artifact from the fixation screws but given the appearance on the CT from 08/09/2023, there is likely additional tumor within the proximal femoral head/neck.    Sacroiliac joints are intact. No pelvic free fluid is seen.      Impression    IMPRESSION:  1.  Large osseous lesion centered in the left acetabulum extending into the superior pubic ramus and into the left iliac wing, suggesting an aggressive neoplasm, likely the reported sarcoma. There is an associated soft tissue component to this lesion   which demonstrates cortical breakthrough through the acetabulum and superior pubic ramus into the adjacent iliopsoas, adductor, and gluteus minimus musculature. There are bulky inguinal and external iliac alec metastases.  2.  Status post cannulated screw fixation of a fracture of the left femoral neck, susceptibility artifact from which degrades evaluation of adjacent structures. The femoral neck is not well evaluated due to susceptibility artifact but there is suspected   tumor within the proximal femoral head and neck given the lytic  appearance on the recent CT.   3.  There is additional soft tissue edema and enhancement surrounding proximal left femur extending into the adjacent quadriceps and gluteal musculature, and it is difficult to determine to what degree this represents postoperative change versus   additional tumor involvement.

## 2023-08-16 NOTE — PROGRESS NOTES
Orthopaedic Surgery Progress Note 08/16/2023    S: Intermittently confused yesteday. Otherwise ROXIE. Pain well controlled. Tolerating diet. After discussion with patient and his wife, decision made to pursue cancer treatment here. Patient got MRI last night and was transferred to [place] for further cares. POC reviewed. Questions answered.    O:  Temp: 97.3  F (36.3  C) Temp src: Axillary BP: 124/81 Pulse: 116   Resp: 18 SpO2: 95 % O2 Device: Nasal cannula with humidification Oxygen Delivery: 1.5 LPM    Exam:  Gen: No acute distress, resting comfortably in bed.  Resp: Non-labored breathing  MSK:  LLE:  - Dressings c/d/i  - SILT femoral/tibial/sural/saphenous/DP/SP nerves  - Fires Quad, TA, EHL, FHL, GaSC  - PT/DP pulses 2+, foot wwp    Recent Labs   Lab 08/16/23  0647 08/15/23  0612 08/14/23  0830   WBC 6.9 8.6 9.6   HGB 11.3* 11.2* 14.0    215 226       Assessment/Plan: Sarbjit Swain is a 43 year old male with PMH significant for BRCA1 genotype, medulloblastoma at age 4 s/p  shunt (treated here at [facility]),  rectal cancer diagnosed around 2 years ago s/p hemicolectomy, who presents with pathologic left femoral neck and acetabulum fracture with workup revealing 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 Avila.     Patient followed by medicine and oncology teams. After discussion with different teams, patient elected to pursue treatment at [facility]. He was therefore transferred to [place] for oncologic treatment.     - Primary:  Medicine primary  - Anticoagulation/DVT Prophylaxis: Lovenox 40mg qd  - Antibiotics/Tetanus: Ancef perioperatively  - X-rays/Imaging: MRI pelvis with and without contrast complete; biopsy lung lesion per primary team  - Bracing/Splinting: None  - Elevation: None  - Activity: bed to chair  - Weight bearing: TTWB LLE for transfers  - Pain control: Use all oral meds first then escalate to IV, as ordered  - Diet:  ADAT  - Cultures: None  - Consults Appreciated: Medicine, Oncology, Pathology  - Disposition: TBD  - Follow-up: TBD     Fox Bethea MD  Orthopaedic Surgery, PGY-4  Pager: [phone]

## 2023-08-16 NOTE — CONSULTS
"         Interventional Pulmonology          Initial Inpatient Consultation Note                                     August 16, 2023            Patient: Sarbjit Swain    Date of Admission: 8/10/2023  Reason for Consultation: multiple lung nodules , possible biopsy    Requesting Physician: No referring provider defined for this encounter.      Assessment:   Sarbjit Swain is a 43 year old admitted on 8/10/2023 with left femoral fracture (pathologic). Interventional Pulmonology is consulted today for evaluation of multiple lung nodules and possible biopsy.    Multiple lung nodules FDG avid intrapulmonary and subpleural with differential of being malignant versus infection (fungal)  Pathologic left femoral fracture secondary to sarcoma  H/x of rectal cancer  Remote history of medulloblastoma     Plan:  Patient has multiple bilateral FDG avid lung nodules concerning for metastatic cancer  Patient has recent diagnosis of chondrosarcoma with pulm biopsy also remote history of medulloblastoma and also rectal cancer, status post hemicolectomy in 2021  Patient's lung nodules are amenable for robotic bronchoscopic navigation and biopsy  Oncology evaluation is important to guide the treatment as patient has multiple cancers  Lung biopsy can be done outpatient settings if needed, procedure also can be scheduled for endobronchial ultrasound guided lymph node evaluation and biopsy if needed  Patient and family agreeable for lung biopsy and lymph node biopsy   Scheduled for ION Robotic bronchoscopy and EBUS LN biopsy  Please keep NPO after midnight  Please hold subcutaneous lovenox           Patient seen and discussed with Dr. Surya Lowe  Interventional Pulmonary Fellow    Pager: [phone]            Chief Complaint: \"left femoral fracture, mutiple lung nodules\"    History of Present Illness:   Sarbjit Swain is a 43 year old male with past medical history of medulloblastoma at age 4 status post "  shunt, rectal cancer diagnosed in 2021 status post hemicolectomy who admitted to the hospital after pathologic left femoral neck and acetabular fracture.  Patient status post CT-guided biopsy of the bone which showed high-grade chondroblastic osteosarcoma.  During my evaluation patient was not in distress on 2 L/min nasal cannula oxygen.  No history of smoking.  Patient is not on any home oxygen.  Denies significant respiratory symptoms, no cough no hemoptysis no shortness of breath while at rest.           Data:   All pertinent laboratory and imaging data reviewed.           Past Medical History:   History reviewed. No pertinent past medical history.         Past Surgical History:     Past Surgical History:   Procedure Laterality Date    HIP PINNING Left 8/14/2023    Procedure: OPEN REDUCTION INTERNAL FIXATION LEFT FEMORAL NECK;  Surgeon: Dann Avila MD;  Location:  OR            Social History:     Social History     Socioeconomic History    Marital status:      Spouse name: Not on file    Number of children: Not on file    Years of education: Not on file    Highest education level: Not on file   Occupational History    Not on file   Tobacco Use    Smoking status: Not on file    Smokeless tobacco: Not on file   Substance and Sexual Activity    Alcohol use: Not on file    Drug use: Not on file    Sexual activity: Not on file   Other Topics Concern    Not on file   Social History Narrative    Not on file     Social Determinants of Health     Financial Resource Strain: Not on file   Food Insecurity: Not on file   Transportation Needs: Not on file   Physical Activity: Not on file   Stress: Not on file   Social Connections: Not on file   Intimate Partner Violence: Not on file   Housing Stability: Not on file            Family History:   History reviewed. No pertinent family history.         Allergies:     Allergies   Allergen Reactions    Capecitabine Other (See Comments)     Severe chest  pain, lasted 5 days of taking med plus infusion    Seasonal Allergies Itching            Medications:      enoxaparin ANTICOAGULANT  40 mg Subcutaneous Q24H    fentaNYL  50 mcg Transdermal Q72H    And    fentaNYL   Transdermal Q8H MARY    pantoprazole  40 mg Oral Daily    polyethylene glycol  17 g Oral Daily    senna-docusate  1 tablet Oral BID    sodium chloride (PF)  3 mL Intracatheter Q8H         Review of Systems:  Gen: negative for fever, chills, change in weight  ENT: no sore throat, new sinus pain or nasal drainage  Resp: see interval history  CV: no chest pain, no palpitations  GI: no nausea, vomiting, change in stools  : no dysuria  MSK: no myalgias, arthralgias  Lymph no new lymphadenopathy  Neuro: no numbness, weakness, headaches  Heme: no bleeding or easy bruisability  Skin: no rash or obvious new lesions  Psych: mood stable         Physical Exam:   Temp:  [97.3  F (36.3  C)-99.4  F (37.4  C)] 98.2  F (36.8  C)  Pulse:  [105-122] 122  Resp:  [16-18] 18  BP: (104-141)/(67-89) 141/89  SpO2:  [91 %-96 %] 95 %  General: Awake, alert and in no apparent distress   Neck: Trachea supple/midline  Pulm: Clear breath sounds b/l, no crackles, no wheezes  CV: RRR, no murmurs  Abdomen: Soft, non-tender, non-distended   MSK: No edema, no clubbing   Neurologic: No focal deficits  Skin: No obvious rash. Warm and dry  Psych: AAOx3, not agitated, answering to questions appropriately

## 2023-08-17 ENCOUNTER — APPOINTMENT (OUTPATIENT)
Dept: GENERAL RADIOLOGY | Facility: CLINIC | Age: 43
End: 2023-08-17
Attending: INTERNAL MEDICINE
Payer: COMMERCIAL

## 2023-08-17 ENCOUNTER — ANESTHESIA EVENT (OUTPATIENT)
Dept: SURGERY | Facility: CLINIC | Age: 43
End: 2023-08-17
Payer: COMMERCIAL

## 2023-08-17 ENCOUNTER — ANESTHESIA (OUTPATIENT)
Dept: SURGERY | Facility: CLINIC | Age: 43
End: 2023-08-17
Payer: COMMERCIAL

## 2023-08-17 PROBLEM — C40.20: Status: ACTIVE | Noted: 2023-08-17

## 2023-08-17 LAB
ANION GAP SERPL CALCULATED.3IONS-SCNC: 12 MMOL/L (ref 7–15)
BUN SERPL-MCNC: 11.7 MG/DL (ref 6–20)
CALCIUM SERPL-MCNC: 9.2 MG/DL (ref 8.6–10)
CHLORIDE SERPL-SCNC: 95 MMOL/L (ref 98–107)
CREAT SERPL-MCNC: 0.84 MG/DL (ref 0.67–1.17)
DEPRECATED HCO3 PLAS-SCNC: 27 MMOL/L (ref 22–29)
ERYTHROCYTE [DISTWIDTH] IN BLOOD BY AUTOMATED COUNT: 13.9 % (ref 10–15)
GFR SERPL CREATININE-BSD FRML MDRD: >90 ML/MIN/1.73M2
GLUCOSE BLDC GLUCOMTR-MCNC: 105 MG/DL (ref 70–99)
GLUCOSE SERPL-MCNC: 114 MG/DL (ref 70–99)
HCT VFR BLD AUTO: 32.7 % (ref 40–53)
HGB BLD-MCNC: 10.8 G/DL (ref 13.3–17.7)
MCH RBC QN AUTO: 28.5 PG (ref 26.5–33)
MCHC RBC AUTO-ENTMCNC: 33 G/DL (ref 31.5–36.5)
MCV RBC AUTO: 86 FL (ref 78–100)
PLATELET # BLD AUTO: 278 10E3/UL (ref 150–450)
POTASSIUM SERPL-SCNC: 4.4 MMOL/L (ref 3.4–5.3)
RBC # BLD AUTO: 3.79 10E6/UL (ref 4.4–5.9)
SODIUM SERPL-SCNC: 134 MMOL/L (ref 136–145)
WBC # BLD AUTO: 7.3 10E3/UL (ref 4–11)

## 2023-08-17 PROCEDURE — 370N000017 HC ANESTHESIA TECHNICAL FEE, PER MIN: Performed by: INTERNAL MEDICINE

## 2023-08-17 PROCEDURE — 710N000010 HC RECOVERY PHASE 1, LEVEL 2, PER MIN: Performed by: INTERNAL MEDICINE

## 2023-08-17 PROCEDURE — 36415 COLL VENOUS BLD VENIPUNCTURE: CPT

## 2023-08-17 PROCEDURE — 272N000451 HC KIT SHRLOCK 5FR POWER PICC DOUBLE LUMEN

## 2023-08-17 PROCEDURE — 250N000011 HC RX IP 250 OP 636: Mod: JZ | Performed by: ANESTHESIOLOGY

## 2023-08-17 PROCEDURE — 360N000083 HC SURGERY LEVEL 3 W/ FLUORO, PER MIN: Performed by: INTERNAL MEDICINE

## 2023-08-17 PROCEDURE — 88342 IMHCHEM/IMCYTCHM 1ST ANTB: CPT | Mod: 26 | Performed by: PATHOLOGY

## 2023-08-17 PROCEDURE — 999N000141 HC STATISTIC PRE-PROCEDURE NURSING ASSESSMENT: Performed by: INTERNAL MEDICINE

## 2023-08-17 PROCEDURE — 999N000065 XR CHEST PORT 1 VIEW

## 2023-08-17 PROCEDURE — 250N000013 HC RX MED GY IP 250 OP 250 PS 637

## 2023-08-17 PROCEDURE — 80048 BASIC METABOLIC PNL TOTAL CA: CPT

## 2023-08-17 PROCEDURE — 88305 TISSUE EXAM BY PATHOLOGIST: CPT | Mod: TC | Performed by: INTERNAL MEDICINE

## 2023-08-17 PROCEDURE — 85027 COMPLETE CBC AUTOMATED: CPT | Performed by: STUDENT IN AN ORGANIZED HEALTH CARE EDUCATION/TRAINING PROGRAM

## 2023-08-17 PROCEDURE — 88172 CYTP DX EVAL FNA 1ST EA SITE: CPT | Mod: 26 | Performed by: PATHOLOGY

## 2023-08-17 PROCEDURE — 258N000003 HC RX IP 258 OP 636: Performed by: ANESTHESIOLOGY

## 2023-08-17 PROCEDURE — 71045 X-RAY EXAM CHEST 1 VIEW: CPT | Mod: 26 | Performed by: RADIOLOGY

## 2023-08-17 PROCEDURE — 250N000009 HC RX 250: Performed by: STUDENT IN AN ORGANIZED HEALTH CARE EDUCATION/TRAINING PROGRAM

## 2023-08-17 PROCEDURE — 120N000005 HC R&B MS OVERFLOW UMMC

## 2023-08-17 PROCEDURE — 88341 IMHCHEM/IMCYTCHM EA ADD ANTB: CPT | Mod: 26 | Performed by: PATHOLOGY

## 2023-08-17 PROCEDURE — 36569 INSJ PICC 5 YR+ W/O IMAGING: CPT

## 2023-08-17 PROCEDURE — 250N000009 HC RX 250: Performed by: ANESTHESIOLOGY

## 2023-08-17 PROCEDURE — 250N000011 HC RX IP 250 OP 636: Mod: JZ

## 2023-08-17 PROCEDURE — 250N000011 HC RX IP 250 OP 636: Mod: JZ | Performed by: STUDENT IN AN ORGANIZED HEALTH CARE EDUCATION/TRAINING PROGRAM

## 2023-08-17 PROCEDURE — 88173 CYTOPATH EVAL FNA REPORT: CPT | Mod: 26 | Performed by: PATHOLOGY

## 2023-08-17 PROCEDURE — 99232 SBSQ HOSP IP/OBS MODERATE 35: CPT | Mod: GC | Performed by: STUDENT IN AN ORGANIZED HEALTH CARE EDUCATION/TRAINING PROGRAM

## 2023-08-17 PROCEDURE — 272N000001 HC OR GENERAL SUPPLY STERILE: Performed by: INTERNAL MEDICINE

## 2023-08-17 PROCEDURE — 31652 BRONCH EBUS SAMPLNG 1/2 NODE: CPT | Mod: GC | Performed by: INTERNAL MEDICINE

## 2023-08-17 PROCEDURE — 88344 IMHCHEM/IMCYTCHM EA MLT ANTB: CPT | Mod: 26 | Performed by: PATHOLOGY

## 2023-08-17 PROCEDURE — 258N000003 HC RX IP 258 OP 636: Performed by: INTERNAL MEDICINE

## 2023-08-17 PROCEDURE — 250N000013 HC RX MED GY IP 250 OP 250 PS 637: Performed by: STUDENT IN AN ORGANIZED HEALTH CARE EDUCATION/TRAINING PROGRAM

## 2023-08-17 PROCEDURE — 0BBC8ZX EXCISION OF RIGHT UPPER LUNG LOBE, VIA NATURAL OR ARTIFICIAL OPENING ENDOSCOPIC, DIAGNOSTIC: ICD-10-PCS | Performed by: INTERNAL MEDICINE

## 2023-08-17 PROCEDURE — 88305 TISSUE EXAM BY PATHOLOGIST: CPT | Mod: 26 | Performed by: PATHOLOGY

## 2023-08-17 PROCEDURE — 250N000013 HC RX MED GY IP 250 OP 250 PS 637: Performed by: PHYSICIAN ASSISTANT

## 2023-08-17 RX ORDER — OXYCODONE HYDROCHLORIDE 10 MG/1
10 TABLET ORAL
Status: CANCELLED | OUTPATIENT
Start: 2023-08-17

## 2023-08-17 RX ORDER — ONDANSETRON 2 MG/ML
4 INJECTION INTRAMUSCULAR; INTRAVENOUS EVERY 30 MIN PRN
Status: DISCONTINUED | OUTPATIENT
Start: 2023-08-17 | End: 2023-08-17 | Stop reason: HOSPADM

## 2023-08-17 RX ORDER — SODIUM CHLORIDE, SODIUM LACTATE, POTASSIUM CHLORIDE, CALCIUM CHLORIDE 600; 310; 30; 20 MG/100ML; MG/100ML; MG/100ML; MG/100ML
INJECTION, SOLUTION INTRAVENOUS CONTINUOUS
Status: DISCONTINUED | OUTPATIENT
Start: 2023-08-17 | End: 2023-08-17 | Stop reason: HOSPADM

## 2023-08-17 RX ORDER — PROPOFOL 10 MG/ML
INJECTION, EMULSION INTRAVENOUS PRN
Status: DISCONTINUED | OUTPATIENT
Start: 2023-08-17 | End: 2023-08-17

## 2023-08-17 RX ORDER — HYDROMORPHONE HCL IN WATER/PF 6 MG/30 ML
0.4 PATIENT CONTROLLED ANALGESIA SYRINGE INTRAVENOUS EVERY 5 MIN PRN
Status: DISCONTINUED | OUTPATIENT
Start: 2023-08-17 | End: 2023-08-17 | Stop reason: HOSPADM

## 2023-08-17 RX ORDER — HEPARIN SODIUM,PORCINE 10 UNIT/ML
5-20 VIAL (ML) INTRAVENOUS
Status: DISCONTINUED | OUTPATIENT
Start: 2023-08-17 | End: 2023-08-24 | Stop reason: HOSPADM

## 2023-08-17 RX ORDER — LIDOCAINE 40 MG/G
CREAM TOPICAL
Status: ACTIVE | OUTPATIENT
Start: 2023-08-17 | End: 2023-08-20

## 2023-08-17 RX ORDER — FENTANYL CITRATE 50 UG/ML
INJECTION, SOLUTION INTRAMUSCULAR; INTRAVENOUS PRN
Status: DISCONTINUED | OUTPATIENT
Start: 2023-08-17 | End: 2023-08-17

## 2023-08-17 RX ORDER — LIDOCAINE HYDROCHLORIDE 20 MG/ML
INJECTION, SOLUTION INFILTRATION; PERINEURAL PRN
Status: DISCONTINUED | OUTPATIENT
Start: 2023-08-17 | End: 2023-08-17

## 2023-08-17 RX ORDER — ONDANSETRON 2 MG/ML
4 INJECTION INTRAMUSCULAR; INTRAVENOUS EVERY 30 MIN PRN
Status: CANCELLED | OUTPATIENT
Start: 2023-08-17

## 2023-08-17 RX ORDER — ONDANSETRON 4 MG/1
4 TABLET, ORALLY DISINTEGRATING ORAL EVERY 30 MIN PRN
Status: CANCELLED | OUTPATIENT
Start: 2023-08-17

## 2023-08-17 RX ORDER — ONDANSETRON 4 MG/1
4 TABLET, ORALLY DISINTEGRATING ORAL EVERY 30 MIN PRN
Status: DISCONTINUED | OUTPATIENT
Start: 2023-08-17 | End: 2023-08-17 | Stop reason: HOSPADM

## 2023-08-17 RX ORDER — HEPARIN SODIUM,PORCINE 10 UNIT/ML
5-20 VIAL (ML) INTRAVENOUS EVERY 24 HOURS
Status: DISCONTINUED | OUTPATIENT
Start: 2023-08-17 | End: 2023-08-24 | Stop reason: HOSPADM

## 2023-08-17 RX ORDER — FENTANYL CITRATE 50 UG/ML
25 INJECTION, SOLUTION INTRAMUSCULAR; INTRAVENOUS EVERY 5 MIN PRN
Status: DISCONTINUED | OUTPATIENT
Start: 2023-08-17 | End: 2023-08-17 | Stop reason: HOSPADM

## 2023-08-17 RX ORDER — FENTANYL CITRATE 50 UG/ML
50 INJECTION, SOLUTION INTRAMUSCULAR; INTRAVENOUS EVERY 5 MIN PRN
Status: DISCONTINUED | OUTPATIENT
Start: 2023-08-17 | End: 2023-08-17 | Stop reason: HOSPADM

## 2023-08-17 RX ORDER — OXYCODONE HYDROCHLORIDE 5 MG/1
5 TABLET ORAL
Status: CANCELLED | OUTPATIENT
Start: 2023-08-17

## 2023-08-17 RX ORDER — ONDANSETRON 2 MG/ML
INJECTION INTRAMUSCULAR; INTRAVENOUS PRN
Status: DISCONTINUED | OUTPATIENT
Start: 2023-08-17 | End: 2023-08-17

## 2023-08-17 RX ORDER — PROPOFOL 10 MG/ML
INJECTION, EMULSION INTRAVENOUS CONTINUOUS PRN
Status: DISCONTINUED | OUTPATIENT
Start: 2023-08-17 | End: 2023-08-17

## 2023-08-17 RX ORDER — SODIUM CHLORIDE, SODIUM LACTATE, POTASSIUM CHLORIDE, CALCIUM CHLORIDE 600; 310; 30; 20 MG/100ML; MG/100ML; MG/100ML; MG/100ML
INJECTION, SOLUTION INTRAVENOUS CONTINUOUS PRN
Status: DISCONTINUED | OUTPATIENT
Start: 2023-08-17 | End: 2023-08-17

## 2023-08-17 RX ORDER — DEXAMETHASONE SODIUM PHOSPHATE 4 MG/ML
INJECTION, SOLUTION INTRA-ARTICULAR; INTRALESIONAL; INTRAMUSCULAR; INTRAVENOUS; SOFT TISSUE PRN
Status: DISCONTINUED | OUTPATIENT
Start: 2023-08-17 | End: 2023-08-17

## 2023-08-17 RX ORDER — HYDROMORPHONE HCL IN WATER/PF 6 MG/30 ML
0.2 PATIENT CONTROLLED ANALGESIA SYRINGE INTRAVENOUS EVERY 5 MIN PRN
Status: DISCONTINUED | OUTPATIENT
Start: 2023-08-17 | End: 2023-08-17 | Stop reason: HOSPADM

## 2023-08-17 RX ADMIN — PROPOFOL 200 MG: 10 INJECTION, EMULSION INTRAVENOUS at 09:51

## 2023-08-17 RX ADMIN — PROPOFOL 150 MCG/KG/MIN: 10 INJECTION, EMULSION INTRAVENOUS at 09:51

## 2023-08-17 RX ADMIN — Medication 25 MG: at 21:04

## 2023-08-17 RX ADMIN — OXYCODONE HYDROCHLORIDE 10 MG: 10 TABLET ORAL at 02:18

## 2023-08-17 RX ADMIN — FENTANYL CITRATE 100 MCG: 50 INJECTION, SOLUTION INTRAMUSCULAR; INTRAVENOUS at 09:51

## 2023-08-17 RX ADMIN — SODIUM CHLORIDE, POTASSIUM CHLORIDE, SODIUM LACTATE AND CALCIUM CHLORIDE: 600; 310; 30; 20 INJECTION, SOLUTION INTRAVENOUS at 09:37

## 2023-08-17 RX ADMIN — SUGAMMADEX 200 MG: 100 INJECTION, SOLUTION INTRAVENOUS at 10:14

## 2023-08-17 RX ADMIN — LIDOCAINE HYDROCHLORIDE ANHYDROUS 3 ML: 10 INJECTION, SOLUTION INFILTRATION at 16:38

## 2023-08-17 RX ADMIN — PANTOPRAZOLE SODIUM 40 MG: 40 TABLET, DELAYED RELEASE ORAL at 08:13

## 2023-08-17 RX ADMIN — DEXAMETHASONE SODIUM PHOSPHATE 8 MG: 4 INJECTION, SOLUTION INTRA-ARTICULAR; INTRALESIONAL; INTRAMUSCULAR; INTRAVENOUS; SOFT TISSUE at 10:02

## 2023-08-17 RX ADMIN — LIDOCAINE HYDROCHLORIDE 100 MG: 20 INJECTION, SOLUTION INFILTRATION; PERINEURAL at 09:51

## 2023-08-17 RX ADMIN — OXYCODONE HYDROCHLORIDE 10 MG: 10 TABLET ORAL at 08:13

## 2023-08-17 RX ADMIN — Medication 50 MG: at 09:51

## 2023-08-17 RX ADMIN — SODIUM CHLORIDE, POTASSIUM CHLORIDE, SODIUM LACTATE AND CALCIUM CHLORIDE: 600; 310; 30; 20 INJECTION, SOLUTION INTRAVENOUS at 08:13

## 2023-08-17 RX ADMIN — ENOXAPARIN SODIUM 40 MG: 40 INJECTION SUBCUTANEOUS at 20:33

## 2023-08-17 RX ADMIN — ONDANSETRON 4 MG: 2 INJECTION INTRAMUSCULAR; INTRAVENOUS at 10:14

## 2023-08-17 RX ADMIN — HYDROMORPHONE HYDROCHLORIDE 0.4 MG: 0.2 INJECTION, SOLUTION INTRAMUSCULAR; INTRAVENOUS; SUBCUTANEOUS at 10:55

## 2023-08-17 RX ADMIN — Medication 5 ML: at 18:23

## 2023-08-17 RX ADMIN — SODIUM CHLORIDE, POTASSIUM CHLORIDE, SODIUM LACTATE AND CALCIUM CHLORIDE: 600; 310; 30; 20 INJECTION, SOLUTION INTRAVENOUS at 10:48

## 2023-08-17 ASSESSMENT — ACTIVITIES OF DAILY LIVING (ADL)
ADLS_ACUITY_SCORE: 47
ADLS_ACUITY_SCORE: 40
ADLS_ACUITY_SCORE: 47
ADLS_ACUITY_SCORE: 40
ADLS_ACUITY_SCORE: 40
ADLS_ACUITY_SCORE: 47
ADLS_ACUITY_SCORE: 40
ADLS_ACUITY_SCORE: 47
ADLS_ACUITY_SCORE: 47

## 2023-08-17 NOTE — PROCEDURES
INTERVENTIONAL PULMONOLOGY       Procedure(s):    A flexible bronchoscopy  Airway exam  EBUS-TBNA (1 site)  Therapeutic suctioning (1 site)    Indication:  multiple lung nodules    Attending of Record:  Tomas London MD    Interventional Pulmonary Fellow   Schuyler Lowe    Trainees Present:   None     Medications:    General Anesthesia - See anesthesia flowsheet for details    Sedation Time:   Per Anesthesia Care Provider    Time Out:  Performed    The patient's medical record has been reviewed.  The indication for the procedure was reviewed.  The necessary history and physical examination was performed and reviewed.  The risks, benefits and alternatives of the procedure were discussed with the the patient in detail and he had the opportunity to ask questions.  I discussed in particular the potential complications including risks of minor or life-threatening bleeding and/or infection, respiratory failure, vocal cord trauma / paralysis, pneumothorax, and discomfort. Sedation risks were also discussed including abnormal heart rhythms, low blood pressure, and respiratory failure. All questions were answered to the best of my ability.  Verbal and written informed consent was obtained.  The proposed procedure and the patient's identification were verified prior to the procedure by the physician and the nurse.    The patient was assessed for the adequacy for the procedure and to receive medications.   Mental Status:  Alert and oriented x 3  Airway examination:  Class I (Complete visualization of soft palate)  Pulmonary:  Non labored respirations  CV:  Regular rate  ASA Grade:  (III)  severe systemic disease    After clinical evaluation and reviewing the indication, risks, alternatives and benefits of the procedure the patient was deemed to be in satisfactory condition to undergo the procedure.           A Tuberculosis risk assessment was performed:  The patient has no known RISK of Tuberculosis    The procedure  was performed in a negative airflow room: The patient could not be moved to a negative airflow room because of no risk of TB and needed OR for the procedure    Maneuvers / Procedure:      A Flexible  bronchoscope was used for the procedure. The patient was orally intubated with a # 8.5 ETT and the flexible scope was passed through.    Airway Examination: A complete airway examination was performed from the distal trachea to the subsegmental level in each lobe of both lungs.  Pertinent findings include clear airways, no endobronchial lesions.           EBUS-TBNA (Nodule/Mass): The EBUS scope was inserted and the RUL nodule/mass was visualized and biopsied. A total of 5 biopsies were performed using 22G FNA Needle. . Doretha was Present    Any disposable equipment was visually inspected and deemed to be intact immediately post procedure.      Relevant Pictures  RUL paratracheal lesion        RUL paratracheal mass with EBUS needle inside            Assessment and Recommendations:     Successful completion of FB with EBUS TBNA RUL lesion  CXR ordered  Follow up biopsy results with oncology          Schuyler Lowe  Interventional pulmonary fellow  Pager: [phone]

## 2023-08-17 NOTE — ANESTHESIA PROCEDURE NOTES
Airway       Patient location during procedure: OR       Procedure Start/Stop Times: 8/17/2023 9:54 AM  Staff -        CRNA: Sandra Talbot APRN CRNA       Performed By: CRNA  Consent for Airway        Urgency: elective  Indications and Patient Condition       Indications for airway management: delmy-procedural       Induction type:intravenous       Mask difficulty assessment: 1 - vent by mask    Final Airway Details       Final airway type: endotracheal airway       Successful airway: ETT - single and Oral  Endotracheal Airway Details        ETT size (mm): 8.5       Cuffed: yes       Successful intubation technique: video laryngoscopy       VL Blade Size: MAC 4       Grade View of Cords: 1       Adjucts: stylet       Position: Right       Measured from: lips       Secured at (cm): 23       Bite block used: None    Post intubation assessment        Placement verified by: capnometry and equal breath sounds        Number of attempts at approach: 1       Number of other approaches attempted: 0       Secured with: commercial tube polanco (Tube Tamer)       Ease of procedure: easy       Dentition: Intact and Unchanged    Medication(s) Administered   Medication Administration Time: 8/17/2023 9:54 AM

## 2023-08-17 NOTE — ANESTHESIA CARE TRANSFER NOTE
Patient: Sarbjit Swain    Procedure: Procedure(s):  BRONCHOSCOPY,  WITH ENDOBRONCHIAL ULTRASOUND GUIDANCE       Diagnosis: Pulmonary nodules [R91.8]  Diagnosis Additional Information: No value filed.    Anesthesia Type:   No value filed.     Note:    Oropharynx: oropharynx clear of all foreign objects and spontaneously breathing  Level of Consciousness: awake  Oxygen Supplementation: nasal cannula  Level of Supplemental Oxygen (L/min / FiO2): 2  Independent Airway: airway patency satisfactory and stable  Dentition: dentition unchanged  Vital Signs Stable: post-procedure vital signs reviewed and stable  Report to RN Given: handoff report given  Patient transferred to: PACU    Handoff Report: Identifed the Patient, Identified the Reponsible Provider, Reviewed the pertinent medical history, Discussed the surgical course, Reviewed Intra-OP anesthesia mangement and issues during anesthesia, Set expectations for post-procedure period and Allowed opportunity for questions and acknowledgement of understanding      Vitals:  Vitals Value Taken Time   /89 08/17/23 1030   Temp     Pulse 109 08/17/23 1035   Resp 13 08/17/23 1035   SpO2 97 % 08/17/23 1035   Vitals shown include unvalidated device data.    Electronically Signed By: MICHELLE Greer CRNA  August 17, 2023  10:37 AM

## 2023-08-17 NOTE — ANESTHESIA PREPROCEDURE EVALUATION
Anesthesia Pre-Procedure Evaluation    Patient: Sarbjit Swain   MRN: [id number] : 1980        Procedure : Procedure(s):  BRONCHOSCOPY, WITH BIOPSY OF 1 OR 2 LYMPH NODE STATIONS WITH ENDOBRONCHIAL ULTRASOUND GUIDANCE  Robot Assisted ION bronchoscopy and lung biopsy          History reviewed. No pertinent past medical history.   Past Surgical History:   Procedure Laterality Date    HIP PINNING Left 2023    Procedure: OPEN REDUCTION INTERNAL FIXATION LEFT FEMORAL NECK;  Surgeon: Dann Avila MD;  Location: [facility] OR      Allergies   Allergen Reactions    Capecitabine Other (See Comments)     Severe chest pain, lasted 5 days of taking med plus infusion    Seasonal Allergies Itching      Social History     Tobacco Use    Smoking status: Not on file    Smokeless tobacco: Not on file   Substance Use Topics    Alcohol use: Not on file      Wt Readings from Last 1 Encounters:   23 76.4 kg (168 lb 8 oz)        Anesthesia Evaluation   Pt has had prior anesthetic. Type: General.        ROS/MED HX  ENT/Pulmonary: Comment: Finding on chest Xray.      Neurologic:  - neg neurologic ROS     Cardiovascular:  - neg cardiovascular ROS     METS/Exercise Tolerance:     Hematologic:  - neg hematologic  ROS     Musculoskeletal: Comment: History of left femoral neck fracture.      GI/Hepatic:  - neg GI/hepatic ROS   (+) GERD,                   Renal/Genitourinary:  - neg Renal ROS     Endo:  - neg endo ROS   (+)          thyroid problem,            Psychiatric/Substance Use:  - neg psychiatric ROS     Infectious Disease:  - neg infectious disease ROS     Malignancy:       Other:            Physical Exam    Airway  airway exam normal      Mallampati: I   TM distance: > 3 FB   Neck ROM: full   Mouth opening: > 3 cm    Respiratory Devices and Support         Dental       (+) Minor Abnormalities - some fillings, tiny chips      Cardiovascular   cardiovascular exam normal       Rhythm and rate: regular and normal      Pulmonary   pulmonary exam normal        breath sounds clear to auscultation           OUTSIDE LABS:  CBC:   Lab Results   Component Value Date    WBC 7.3 08/17/2023    WBC 6.9 08/16/2023    HGB 10.8 (L) 08/17/2023    HGB 11.3 (L) 08/16/2023    HCT 32.7 (L) 08/17/2023    HCT 34.9 (L) 08/16/2023     08/17/2023     08/16/2023     BMP:   Lab Results   Component Value Date     (L) 08/17/2023     (L) 08/14/2023    POTASSIUM 4.4 08/17/2023    POTASSIUM 4.2 08/14/2023    CHLORIDE 95 (L) 08/17/2023    CHLORIDE 93 (L) 08/14/2023    CO2 27 08/17/2023    CO2 26 08/14/2023    BUN 11.7 08/17/2023    BUN 13.8 08/14/2023    CR 0.84 08/17/2023    CR 0.87 08/14/2023     (H) 08/17/2023     (H) 08/17/2023     COAGS:   Lab Results   Component Value Date    INR 1.10 08/10/2023     POC: No results found for: BGM, HCG, HCGS  HEPATIC:   Lab Results   Component Value Date    ALBUMIN 3.6 08/11/2023    PROTTOTAL 6.8 08/11/2023    ALT 18 08/11/2023    AST 65 (H) 08/11/2023    ALKPHOS 573 (H) 08/11/2023    BILITOTAL 1.5 (H) 08/11/2023     OTHER:   Lab Results   Component Value Date    JESSICA 9.2 08/17/2023    MAG 2.1 08/14/2023    TSH 2.76 08/14/2023       Anesthesia Plan    ASA Status:  4    NPO Status:  NPO Appropriate    Anesthesia Type: General.   Induction: Intravenous.   Maintenance: Inhalation.        Consents    Anesthesia Plan(s) and associated risks, benefits, and realistic alternatives discussed. Questions answered and patient/representative(s) expressed understanding.     - Discussed: Risks, Benefits and Alternatives for BOTH SEDATION and the PROCEDURE were discussed     - Discussed with:  Patient      - Extended Intubation/Ventilatory Support Discussed: Yes.      - Patient is DNR/DNI Status: No     Use of blood products discussed: Yes.     Postoperative Care    Pain management: IV analgesics.   PONV prophylaxis: Ondansetron (or other 5HT-3), Dexamethasone or Solumedrol     Comments:    Other  Comments: The material risks, benefits, and alternatives were discussed in detail.  The patient agrees to proceed.  The patient has no other complaints at this time.            Perry Gonzalez MD

## 2023-08-17 NOTE — PLAN OF CARE
"/77 (BP Location: Left arm)   Pulse 117   Temp 99.8  F (37.7  C) (Axillary)   Resp 18   Ht 1.6 m (5' 3\")   Wt 74.7 kg (164 lb 11.2 oz)   SpO2 96%   BMI 29.18 kg/m      Tachy (120s -140s with movement), OVSS on 1 L NC.  Denies nausea. Pain controlled with robaxin x1 and oxy x1. Alert and oriented but says illogical statements at times. Voiding well, one large, loose incontinent stool.  Bed alarm in place for safety.  No replacements needed. NPO for lung biopsy today. Continue plan of care.     Problem: Hip Fracture Medical Management  Goal: Optimal Coping with Change in Health Status  Outcome: Progressing   Goal Outcome Evaluation:                        "

## 2023-08-17 NOTE — PROGRESS NOTES
[facility]    Medicine Progress Note - Hospitalist Service, GOLD TEAM 7    Date of Admission:  8/10/2023    Assessment & Plan   Sarbjit Swain is a 43M with hx of Greene's esophagus, BPH, medulloblastoma s/p  shunt, rectal cancer diagnosed 2021 s/p hemicolectomy and capecitabine partial therapy (discontinued due to an chest tightness reaction) and new osteosarcoma of the left ilium who was initially admitted on 8/10 after a pathologic left femoral neck and acetabulum fracture and underwent repair with ortho on 8/14. Now transferred to [place] on medicine team for ongoing management and likely initiation of chemotherapy.    Hallucinations ( Auditory and Visual ) - improved  Acute Encephalopathy  Mentioned that he is having hallucination both auditory and visual after hospitalization which is new for him. He said that it has improved overall after getting better sleep. He has confusion which is his baseline.   DD: Delirium, Metabolic imbalance, Hypnagogic Hallucination,Infection, Brain Mets, Intracranial hemorhage, Drug  Most likely Delirium. No signs and symptoms of infection, has mild hyponatremia under treatment ( less likely). Has history of multiple cancers and stable brain mets.  On imaging, has no intracranial hemorrhage with multiple dural based extraaxial masses and vasogenic edema with right right parieto-occipital dural based mass grossly unchanged from previous imaging.    --- CTM mental status and metabolic status  --- Pain control  --- Avoid other hallucinogenic drug  --- Delirium precaution  --- Trazodone 25 mg HS  --- PT/OT    Pulmonary Lesions with concern for METS  Recent PetCT showed he has multiple bilateral FDG avid intrapulmonary and subpleural nodules and favours met over primary pulm malignancy with intrapulmonary mets.   --- IP radiology following                - Robotic Endobronchial Biopsy on 8/17  --- Oncology following: Biopsy report  pending for chemo plan.    Acute pathologic left femoral neck and acetabulum fracture  High grade Chondroblastic Osteosarcoma  Recently diagnosed osteosarcoma and PET CT concerning for metastatic disease to lungs .Suffered pathologic fracture on 8/9 while crawling around his home. Transferred from [place] for Orthopedics team consultation and likely procedure.  S/p closed reduction percutaneous pinning (CRPP) L hip on 8/14/23 with Dr. Avila.  [facility] for transfers .Transfer pt to [place] for inpatient chemotherapy initiation and to consult interventional pulmonology/radiology for biopsy of pulmonary lesions. Pt is accepted by [facility] to be transferred to medicine team on 8/14 w/ ortho and oncology services following as a consult  --- CTM  --- Onco following: will start Chemo in this hospitalization - pending starting date from onco side. Plan depends on Pulm Biopsy report                             PMR for rehab consulted: recommended TCU  --- Fentanyl patch, oxycodone and Tylenol for pain    Sinus tachycardia- improving  Most likely pain and stress with less sleep. No chest pain or SOB or palpitation or hypoxia. Will do further work up if continues to have sinus tachy with resolving of current concerns.  --- Pain med  --- Delirium precaution  --- CTM    Mild Hyponatremia- improving  Most likely due to hypovolemia  Can also think of SIADH due to pain , stress, or mets, drug. Managed with fluid  --- CTM    Acute on chronic cancer related pain  ---   PTA on fentanyl patch plus oxycodone as needed.  ---   Continue fentanyl patch, oxycodone PRN and Tylenol PRN     Hx of Multiple Cancers:         Stage II colorectal cancer s/p hemicolectomy now s/p re-anastomosis    Follows with Dr. Palma at [facility], last seen 8/7. Dx in 7/2021.  Underwent hemicolectomy 9/2021 followed by re-anastomosis in 12/2021.  ---   Follow up outpatient         Hx of medulloblastoma s/p  shunt   Diagnosed  At age 4. Underwent craniotomy  with resection follow by radiation and chemotherapy.  Has stable right sided brain masses c/w meningiomas being followed by Neurosurgery.   --- will do a CT scan head today.        Hx of Multiple Dural based extraaxial masses ( most lilkely meningioma) and vasogenic edema        BRCA1 positive   Being followed by Oncology for hx of cancer.         Pulm lesions with concern for mets  As above       Chondroblastic Osteosarcoma  As above    Hx of hypothyroidism  Noted in chart. Per review, no abnormal TSH/T4 to fit with diagnosis. Not on pta medication      Diet: NPO per Anesthesia Guidelines for Procedure/Surgery Except for: Meds    DVT Prophylaxis:  Held enoxaparin  Bran Catheter: Not present  Lines: None     Cardiac Monitoring: None  Code Status: Full Code    Expected Discharge Date:  TBD          Ree Mckay MD  Hospitalist Service, [facility] TEAM [address] [place] [address]  Securely message with [employer] (more info)  Text page via [employer] Paging/Directory   See signed in provider for up to date coverage information    Patient is seen by Dr. Steen  ______________________________________________________________________    Interval History   No acute events overnight. Mentioned that he got good sleep overnight. He was emotional about his hallucinations( distorted images and flying objects and feeling of left out ) that he had in [facility] [place] [place]. He said that its improved with good sleep. No SOB, chest pain , cough, abdominal pain. Mentioned that he has left hip pain and other sites pain which are manageable with medication here.     Physical Exam   Vital Signs: Temp: 98.6  F (37  C) Temp src: Oral BP: 138/80 Pulse: (!) 121   Resp: 15 SpO2: 93 % O2 Device: Nasal cannula Oxygen Delivery: 1 LPM  Weight: 168 lbs 8 oz  General Appearance: Awake. Alert and oriented x4. Laying in bed. Appears uncomfortable, no acute distress.  Eyes: Normal lids. Anicteric sclera. Pupils equal and round.  HEENT:  Normocephalic. Nares patent. Mucous membranes moist.  Respiratory: Normal work of breathing on room air. Lungs CTAB. No wheezes.  Cardiovascular: Tachycardic, nl s1 and s2, no m/r/g. Pedal pulses 2+  GI: Non-distended abdomen. Normoactive. Soft.  Lymph/Hematologic: No abnormal or excessive bruising on exposed skin.  Skin: Warm, dry. No rashes on exposed skin.   Musculoskeletal: Moves upper extremities freely. Pain on palpation on the surgical site.  Neurologic: CN II-XII grossly intact.         Data     I have personally reviewed the following data over the past 24 hrs:    7.3  \   10.8 (L)   / 278     134 (L) 95 (L) 11.7 /  105 (H)   4.4 27 0.84 \       Imaging results reviewed over the past 24 hrs:   Recent Results (from the past 24 hour(s))   CT Head w/o Contrast    Narrative    CT HEAD W/O CONTRAST 8/16/2023 12:00 PM    History: Hx of medulloblastoma a/p  shunt with continued confusion  and new hallucinations ( visual and auditory )     Comparison:  Outside MRI 4/17/2023      Technique: Using multidetector thin collimation helical acquisition  technique, axial, coronal and sagittal CT images from the skull base  to the vertex were obtained without intravenous contrast.   (topogram) image(s) also obtained and reviewed.    Findings:   Right frontal approach shunt catheter terminating in the vicinity of  foramen of Monro. Stable size of shunted ventricles.  Limited evaluation of multifocal dural based extra-axial masses  largest of which localized along the right frontal lobe. Continued  vasogenic edema associated with the right parieto-occipital dural  based mass, grossly unchanged from prior MRI.   Scattered numerous cavernomas better depicted on prior MRI; some are  visible on current study.  Status post suboccipital craniotomy. Diffuse cerebellar atrophy also  involving cerebellar peduncles; left greater than right. Dystrophic  calcifications along cerebellar folia, likely related to prior  radiotherapy.    Atherosclerotic calcifications within the carotid siphons and  vertebral arteries.  There is no intracranial hemorrhage or midline shift. Ventricles are  proportionate to the cerebral sulci. The basal cisterns are clear.  Heterogenous density of bony structures predominantly seen along the  calvarium; indeterminate. The mastoid air cells are clear. Polypoid  thickening versus retention cyst within right maxillary sinus.      Impression    Impression:  1. No acute hemorrhage. No midline shift.  2. Stable size of shunted ventricles.  3. Limited evaluation of multifocal dural based extra-axial masses.  Continued vasogenic edema associated with the right parieto-occipital  dural based mass.     I have personally reviewed the examination and initial interpretation  and I agree with the findings.    ELEUTERIO CAT MD         SYSTEM ID:  [id number]   CT Chest w/o Contrast    Narrative    Exam: CT Chest without contrast 8/16/2023 3:11 PM     History: For ION bronchoscopy, ok for day prior the procedure    Comparison: Outside PET/CT 7/21/2023, outside CT 1/30/2023    Technique: Helical acquisition of CT images from the lung apices to  the kidneys without IV contrast. Coronal images and axial MIP images  were reconstructed from the source data.    Findings:   Mediastinum: Probable ill-defined hypoattenuating left thyroid nodule  (2/50) without FDG avidity on comparison PET CT. Heart size is within  normal limits. No pericardial effusion. The great vessels are within  normal limits. Trace extra-axial calcification of the aortic arch. No  mediastinal, hilar, or axillary lymphadenopathy.    Lungs: The trachea and central airways are patent. Multiple new and  enlarging bilateral solid pulmonary nodules and mass, with the largest  measuring 3.7 x 3.5 cm in the right lower lobe (4/288) and 2.4 x 2.1  cm in the posterior right upper lobe (4/139). No pneumothorax or  pleural effusion. Calcified granulomas.    Upper Abdomen:  Limited evaluation of the upper abdomen demonstrates  bilateral renal cysts, the right hepatic lobe cyst, and splenules.    Bones and soft tissues: No acute or suspicious osseous abnormality.  Degenerative changes of the visualized spine. Unchanged L1 compression  fracture deformity. Partially visualized ventriculoperitoneal shunt  extending from the right lower neck and right anterior chest into the  peritoneum.      Impression    Impression:?Numerous new and enlarging metastatic bilateral pulmonary  nodules and masses compared to 2023.    I have personally reviewed the examination and initial interpretation  and I agree with the findings.    SIL CLAUDIO DO         SYSTEM ID:  [id number]   Echo Complete   Result Value    Biplane LVEF 64%    Narrative    [id number]  [id number]  [id number]  [id number]^COLT^RACQUEL     [facility]  Echocardiography Laboratory  [address] [place] [id number]     Name: FIONA LEAL  MRN: [id number]  : 1980  Study Date: 2023 03:41 PM  Age: 43 yrs  Gender: Male  Patient Location: [facility]  Reason For Study: Chemotherapy  Ordering Physician: RACQUEL GREGORY  Performed By: Madie Lara RDCS     BSA: 1.8 m2  Height: 63 in  Weight: 164 lb  HR: 110  BP: 141/89 mmHg  ______________________________________________________________________________  Procedure  Complete Portable Echo Adult. Contrast Optison. Technically difficult  study.Extremely poor acoustic windows. Optison (NDC #5649-4729-10) given  intravenously. Patient was given 5 ml mixture of 3 ml Optison and 6 ml saline.  4 ml wasted.  ______________________________________________________________________________  Interpretation Summary  Technically difficult study.Extremely poor acoustic windows.  Biplane LVEF is 64%.  Right ventricular function, chamber size, wall motion, and thickness are  normal.  The inferior vena cava is normal.  No pericardial effusion is present.  There is  no prior study for direct comparison.  ______________________________________________________________________________  Left Ventricle  Biplane LVEF is 64%. Left ventricular wall thickness is normal. Left  ventricular size is normal. Diastolic function not assessed due to  tachycardia. No regional wall motion abnormalities are seen.     Right Ventricle  Right ventricular function, chamber size, wall motion, and thickness are  normal.     Atria  Both atria appear normal.     Mitral Valve  The mitral valve is normal.     Aortic Valve  The valve leaflets are not well visualized. On Doppler interrogation, there is  no significant stenosis or regurgitation.     Tricuspid Valve  The tricuspid valve is normal. Pulmonary artery systolic pressure cannot be  assessed.     Pulmonic Valve  The valve leaflets are not well visualized. On Doppler interrogation, there is  no significant stenosis or regurgitation.     Vessels  The thoracic aorta cannot be assessed. The pulmonary artery and bifurcation  cannot be assessed. The inferior vena cava is normal.     Pericardium  No pericardial effusion is present.     Compared to Previous Study  There is no prior study for direct comparison.  ______________________________________________________________________________  MMode/2D Measurements & Calculations     LVOT diam: 2.0 cm  LVOT area: 3.1 cm2     EF(MOD-bp): 63.9 %  TAPSE: 1.6 cm     ______________________________________________________________________________  Report approved by: Anahi Sandoval 08/16/2023 04:15 PM

## 2023-08-17 NOTE — PROGRESS NOTES
Orthopaedic Surgery Progress Note 08/17/2023    S: Intermittently confused yesteday, more oriented today.  Feels that it was due to transfer to [place] during the night..  Acute events overnight.  Pain well controlled. Tolerating diet. After discussion with patient and his wife, decision made to pursue cancer treatment here.  Has not been up and out of bed.  Patient will be undergoing a biopsy with pulmonology today.  POC reviewed. Questions answered.    O:  Temp: 99.8  F (37.7  C) Temp src: Axillary BP: 131/77 Pulse: 117   Resp: 18 SpO2: 96 % O2 Device: Nasal cannula with humidification Oxygen Delivery: 1 LPM    Exam:  Gen: No acute distress, resting comfortably in bed.  Resp: Non-labored breathing  MSK:  LLE:  - Dressings c/d/I, slight firmness around incision area  - SILT femoral/tibial/sural/saphenous/DP/SP nerves  - Fires Quad, TA, EHL, FHL, GaSC  - PT/DP pulses 2+, foot wwp    Recent Labs   Lab 08/17/23  0430 08/16/23  0647 08/15/23  0612   WBC 7.3 6.9 8.6   HGB 10.8* 11.3* 11.2*    253 215     Assessment/Plan: Sarbjit Swain is a 43 year old male with PMH significant for BRCA1 genotype, medulloblastoma at age 4 s/p  shunt (treated here at [facility]),  rectal cancer diagnosed around 2 years ago s/p hemicolectomy, who presents with pathologic left femoral neck and acetabulum fracture with workup revealing 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 Avila.  Doing well from orthopedic perspective, now on [place] for oncological management.  Will be undergoing biopsy with pulmonology team today.    - Primary:  Medicine primary  - Anticoagulation/DVT Prophylaxis: Lovenox 40mg qd  - Antibiotics/Tetanus: Ancef perioperatively  - X-rays/Imaging: MRI pelvis with and without contrast complete; biopsy lung lesion per primary team likely current today  - Bracing/Splinting: None  - Elevation: None  - Activity: bed to chair  - Weight  bearing: TTWB LLE for transfers  - Pain control: Use all oral meds first then escalate to IV, as ordered  - Diet: ADAT  - Cultures: None  - Consults Appreciated: Medicine, Oncology, Pathology  - Disposition: Pending oncological work-up  - Follow-up: TBD     Orthopedics will follow peripherally, please do not hesitate to reach out if there are any concerns about his hip.    --  Avila Daneil MD, PhD  Orthopedic Surgery PGY-1  [phone]    Please page me directly with any questions/concerns during regular weekday hours before 5pm. If there is no response, if it is a weekend, or if it is during evening hours, then please page the orthopedic surgery resident on call.

## 2023-08-17 NOTE — OR NURSING
Chest xray completed at bedside in PACU. Dr. Lowe okayed patient to leave PACU and head back to 5C.   
Report taken from Pema VILLALPANDO on [facility] Orth[facility].  Patient brought down by transport on his own bed.  Patient's wife and parents at bedside.  IV patent and IV fluid bolus infusing upon arrival to pre-op.  Consent not signed yet, but will be signed in pre-op.  Patient not wearing gown as too warm currently, just covered with blanket.  No changes since last documented assessment by floor RN.  
Yes...

## 2023-08-17 NOTE — PLAN OF CARE
Problem: Oral Intake Inadequate  Goal: Improved Oral Intake  Outcome: Progressing   Goal Outcome Evaluation:       Pt's appetite is slightly decreased but fairly good overall. See RD note for full assessment and recommendations.

## 2023-08-17 NOTE — ANESTHESIA POSTPROCEDURE EVALUATION
Patient: Sarbjit Swain    Procedure: Procedure(s):  BRONCHOSCOPY,  WITH ENDOBRONCHIAL ULTRASOUND GUIDANCE       Anesthesia Type:  No value filed.    Note:  Disposition: Outpatient   Postop Pain Control: Uneventful            Sign Out: Well controlled pain   PONV: No   Neuro/Psych: Uneventful            Sign Out: Acceptable/Baseline neuro status   Airway/Respiratory: Uneventful            Sign Out: Acceptable/Baseline resp. status   CV/Hemodynamics: Uneventful            Sign Out: Acceptable CV status; No obvious hypovolemia; No obvious fluid overload   Other NRE: NONE   DID A NON-ROUTINE EVENT OCCUR? No    Event details/Postop Comments:  No complaints at this time.           Last vitals:  Vitals Value Taken Time   BP     Temp     Pulse     Resp     SpO2         Electronically Signed By: Perry Gonzalez MD  August 17, 2023  10:30 AM

## 2023-08-17 NOTE — PLAN OF CARE
"T max 99.9, other VSS.  Had lung biopsy today, on [place] post procedure.  Oxycodone x1 for hip/back pain.  Ate small amounts after procedure.  Stool softeners held this a.m. due to large loose stool overnight, only had smear today via bedpan.  Voiding adequately with bedside urinal.  Refused offloading repositioning, educated.  Repositions self/shifts weight in bed, encouraged to turn to R as able due to L hip pain.  Meplilex on sacrum & R elbow for reddened areas.  PICC placed this afternoon.  Continue POC.     Problem: Plan of Care - These are the overarching goals to be used throughout the patient stay.    Goal: Plan of Care Review  Description: The Plan of Care Review/Shift note should be completed every shift.  The Outcome Evaluation is a brief statement about your assessment that the patient is improving, declining, or no change.  This information will be displayed automatically on your shift note.  Outcome: Progressing     Problem: Plan of Care - These are the overarching goals to be used throughout the patient stay.    Goal: Patient-Specific Goal (Individualized)  Description: You can add care plan individualizations to a care plan. Examples of Individualization might be:  \"Parent requests to be called daily at 9am for status\", \"I have a hard time hearing out of my right ear\", or \"Do not touch me to wake me up as it startles me\".  Outcome: Progressing     Problem: Plan of Care - These are the overarching goals to be used throughout the patient stay.    Goal: Absence of Hospital-Acquired Illness or Injury  Outcome: Progressing     Problem: Plan of Care - These are the overarching goals to be used throughout the patient stay.    Goal: Absence of Hospital-Acquired Illness or Injury  Intervention: Identify and Manage Fall Risk  Recent Flowsheet Documentation  Taken 8/17/2023 0800 by Harini West, LIVIER  Safety Promotion/Fall Prevention: activity supervised     Problem: Plan of Care - These are the overarching goals " to be used throughout the patient stay.    Goal: Absence of Hospital-Acquired Illness or Injury  Intervention: Prevent Skin Injury  Recent Flowsheet Documentation  Taken 8/17/2023 1500 by Harini West RN  Body Position: (educated, family present during education) refuses positioning  Taken 8/17/2023 1300 by Harini West RN  Body Position: (educated) refuses positioning  Taken 8/17/2023 0800 by Harini West RN  Body Position: (Moves up in bed & to right.  Refused repo with pillows.  Educated) position changed independently     Problem: Plan of Care - These are the overarching goals to be used throughout the patient stay.    Goal: Absence of Hospital-Acquired Illness or Injury  Intervention: Prevent Infection  Recent Flowsheet Documentation  Taken 8/17/2023 0800 by Harini West RN  Infection Prevention: rest/sleep promoted     Problem: Plan of Care - These are the overarching goals to be used throughout the patient stay.    Goal: Optimal Comfort and Wellbeing  Outcome: Progressing     Problem: Plan of Care - These are the overarching goals to be used throughout the patient stay.    Goal: Optimal Comfort and Wellbeing  Intervention: Monitor Pain and Promote Comfort  Recent Flowsheet Documentation  Taken 8/17/2023 0752 by Harini West RN  Pain Management Interventions: medication (see MAR)     Problem: Plan of Care - These are the overarching goals to be used throughout the patient stay.    Goal: Optimal Comfort and Wellbeing  Intervention: Provide Person-Centered Care  Recent Flowsheet Documentation  Taken 8/17/2023 0800 by Harini West RN  Trust Relationship/Rapport: care explained     Problem: Hip Fracture Medical Management  Goal: Optimal Coping with Change in Health Status  Outcome: Progressing     Problem: Hip Fracture Medical Management  Goal: Absence of Bleeding  Outcome: Progressing     Problem: Hip Fracture Medical Management  Goal: Effective Bowel Elimination  Outcome: Progressing     Problem: Hip  Fracture Medical Management  Goal: Baseline Cognitive Function Maintained  Outcome: Progressing     Problem: Hip Fracture Medical Management  Goal: Absence of Embolism  Outcome: Progressing     Problem: Hip Fracture Medical Management  Goal: Fracture Stability  Outcome: Progressing     Problem: Hip Fracture Medical Management  Goal: Optimal Functional Performance  Outcome: Progressing     Problem: Hip Fracture Medical Management  Goal: Optimal Functional Performance  Intervention: Promote Optimal Functional Status  Recent Flowsheet Documentation  Taken 8/17/2023 0800 by Harini West, RN  Activity Management: activity adjusted per tolerance     Problem: Hip Fracture Medical Management  Goal: Optimal Pain Control and Function  Outcome: Progressing     Problem: Hip Fracture Medical Management  Goal: Optimal Pain Control and Function  Intervention: Manage Acute Orthopaedic-Related Pain  Recent Flowsheet Documentation  Taken 8/17/2023 0752 by Harini West, RN  Pain Management Interventions: medication (see MAR)     Problem: Hip Fracture Medical Management  Goal: Effective Urinary Elimination  Outcome: Progressing     Problem: Oral Intake Inadequate  Goal: Improved Oral Intake  Outcome: Progressing   Goal Outcome Evaluation:

## 2023-08-17 NOTE — CONSULTS
"    Interventional Radiology Consult Service Note    IR consulted for possible \"port for chemo\".     This is a ...43 y.o. male with hx of Greene's esophagus, BPH, medulloblastoma s/p  shunt, rectal cancer diagnosed 2021 s/p hemicolectomy and capecitabine partial therapy (discontinued due to an chest tightness reaction) and new osteosarcoma of the left ilium who was initially admitted on 8/10 after a pathologic left femoral neck and acetabulum fracture and underwent repair with ortho on 8/14. Patient now transferred to [facility] team for ongoing management and initiation of chemotherapy. IR has been asked to place an inpatient chest port for chemotherapy.    Recommendations were reviewed with requesting provider, Dr. Tiffanie Steen. IR does not offer inpatient port placements due to increased infection risks. Chemotherapy can still be initiated asap via a PICC. IR recommends scheduling patient for an outpatient chest port placement when patient gets discharged if needing long-term chemotherapy.     Vitals:   /66   Pulse 113   Temp 99.7  F (37.6  C) (Core)   Resp 21   Ht 1.6 m (5' 3\")   Wt 76.4 kg (168 lb 8 oz)   SpO2 95%   BMI 29.85 kg/m      Pertinent Labs:     Lab Results   Component Value Date    WBC 7.3 08/17/2023    WBC 6.9 08/16/2023    WBC 8.6 08/15/2023       Lab Results   Component Value Date    HGB 10.8 08/17/2023    HGB 11.3 08/16/2023    HGB 11.2 08/15/2023       Lab Results   Component Value Date     08/17/2023     08/16/2023     08/15/2023       Lab Results   Component Value Date    INR 1.10 08/10/2023       Lab Results   Component Value Date    POTASSIUM 4.4 08/17/2023        Cindy Cantu PA-C  Interventional Radiology   Pager: [phone]    "

## 2023-08-17 NOTE — PROCEDURES
[facility]    Double Lumen PICC Placement    Date/Time: 8/17/2023 4:29 PM    Performed by: Tiffanie Matute RN  Authorized by: Tiffanie Steen MD  Indications: chemotherapy.      UNIVERSAL PROTOCOL   Site Marked: Yes  Prior Images Obtained and Reviewed:  Yes  Required items: Required blood products, implants, devices and special equipment available    Patient identity confirmed:  Verbally with patient, arm band, provided demographic data and hospital-assigned identification number  Patient was reevaluated immediately before administering moderate or deep sedation or anesthesia  Confirmation Checklist:  Patient's identity using two indicators, relevant allergies, procedure was appropriate and matched the consent or emergent situation and correct equipment/implants were available  Time out: Immediately prior to the procedure a time out was called    Universal Protocol: the Joint Commission Universal Protocol was followed    Preparation: Patient was prepped and draped in usual sterile fashion       ANESTHESIA    Anesthesia:  See MAR for details  Local Anesthetic:  Lidocaine 1% with epinephrine  Anesthetic Total (mL):  3      SEDATION    Patient Sedated: No        Preparation: skin prepped with ChloraPrep  Skin prep agent: skin prep agent completely dried prior to procedure  Sterile barriers: maximum sterile barriers were used: cap, mask, sterile gown, sterile gloves, and large sterile sheet  Hand hygiene: hand hygiene performed prior to central venous catheter insertion  Type of line used: PICC  Catheter type: double lumen  Lumen type: non-valved and power PICC  Lumen Identification: Purple and Red  Catheter size: 5 Fr  Brand: Bard  Lot number: [id number]  Placement method: venipuncture, MST, ultrasound and tip navigation system  Number of attempts: 1  Difficulty threading catheter: no  Successful placement: yes  Orientation: right  Catheter to Vein (%): 37  Location: basilic  vein (vein diameter- 0.49cm)  Arm circumference: adults 10 cm  Extremity circumference: 29  Visible catheter length: 2  Total catheter length: 38  Dressing and securement: alcohol impregnated caps, blood cleaned with CHG, chlorhexidine disc applied, glue, site cleansed, statlock, sterile dressing applied and transparent dressing  Post procedure assessment: blood return through all ports, free fluid flow and placement verified by 3CG technology  PROCEDURE   Patient Tolerance:  Patient tolerated the procedure well with no immediate complicationsDescribe Procedure: PICC placement verified by [employer] 3CG tip confirmation system. PICC okay to use.  Disposal: sharps and needle count correct at the end of procedure, needles and guidewire disposed in sharps container

## 2023-08-17 NOTE — PROGRESS NOTES
CLINICAL NUTRITION SERVICES - ASSESSMENT NOTE     Nutrition Prescription    RECOMMENDATIONS FOR MDs/PROVIDERS TO ORDER:  Recommend minimizing interruptions to regular diet to encourage PO intake.    Malnutrition Status:    Patient does not meet two of the established criteria necessary for diagnosing malnutrition but is at risk for malnutrition    Recommendations already ordered by Registered Dietitian (RD):  Ordered Ensure Clear berry at 2 pm + additional supplements PRN.    Future/Additional Recommendations:  Monitor intake, diet advancement, supplement preferences.     REASON FOR ASSESSMENT  Sarbjit Swain is a/an 43 year old male assessed by the dietitian for LOS    NUTRITION HISTORY  Chart reviewed.  Pt with hx of Greene's esophagus, BPH, medulloblastoma s/p  shunt, rectal cancer diagnosed 2021 s/p hemicolectomy and capecitabine partial therapy (discontinued due to an chest tightness reaction) and new osteosarcoma of the left ilium who was initially admitted on 8/10 after a pathologic left femoral neck and acetabulum fracture and underwent repair with ortho on 8/14. Now transferred to [facility] on medicine team for ongoing management and likely initiation of chemotherapy.     Met with pt.  He reports that eating is going alright and his appetite is fairly good. His appetite was normal PTA, no decrease noted. He normally tries to eat 3 meals a day. He's aware that he's lost some wt. During admission his appetite has been slightly lower and he's been frequently NPO. He likes oatmeal and yogurt.  He's tried chocolate Ensure and didn't like it. He's never heard of Ensure Clear and is willing to try it (berry flavor).    CURRENT NUTRITION ORDERS  Diet: Clear Liquid (8/17 - ), intermittently Regular and NPO previously  Intake/Tolerance: Pt consuming % of meals per flowsheet. Ordering 1-2 meals a day per [employer].    LABS  Labs reviewed  Na 134 (L).  Glucose 114 (H).    MEDICATIONS  Medications  "reviewed  Protonix  Miralax, senna  PRN tums, zofran, compazine    ANTHROPOMETRICS  Height: 160 cm (5' 3\")  Most Recent Weight: 76.4 kg (168 lb 8 oz)    IBW: 56.4 kg (135% IBW)  BMI: 29.85 kg/m2 - Overweight BMI 25-29.9  Weight History:   Wt Readings from Last 15 Encounters:   08/17/23 76.4 kg (168 lb 8 oz)   77.2 kg (170 lb 3.1 oz) 07/27/2023  81.5 kg (179 lb 10.8 oz) 06/21/2023  81.5 kg (179 lb 10.8 oz) 06/06/2023  81.5 kg (179 lb 12.6 oz) 04/21/2023  82.7 kg (182 lb 5.1 oz) 01/20/2023  6.2% wt loss in 2 months     Dosing Weight: 61.4 kg (adjusted based on actual wt and IBW)    ASSESSED NUTRITION NEEDS  Estimated Energy Needs: 1535 - 1842 kcals/day (25 - 30 kcals/kg)  Justification: Maintenance  Estimated Protein Needs: 74 - 92 grams protein/day (1.2 - 1.5 grams of pro/kg)  Justification: Increased needs  Estimated Fluid Needs: 1535 - 1842 mL/day (1 mL/kcal)   Justification: Maintenance    PHYSICAL FINDINGS  See malnutrition section below.    MALNUTRITION  % Intake: < 75% for > 7 days (moderate)  % Weight Loss: Weight loss does not meet criteria  Subcutaneous Fat Loss: None observed  Muscle Loss: None observed  Fluid Accumulation/Edema: None noted  Malnutrition Diagnosis: Patient does not meet two of the established criteria necessary for diagnosing malnutrition but is at risk for malnutrition    NUTRITION DIAGNOSIS  Inadequate oral intake related to intermittent NPO and mildly decreased appetite as evidenced by pt report and record of pt ordering 1-2 meals a day.      INTERVENTIONS  Implementation  Nutrition Education: RD role in care   Medical food supplement therapy     Goals  Patient to consume % of nutritionally adequate meal trays TID, or the equivalent with supplements/snacks.     Monitoring/Evaluation  Progress toward goals will be monitored and evaluated per protocol.  Alpesh Brown, RD, LD  5C (BMT) RD pager: [phone]  Weekend/Holiday RD pager: [phone]  "

## 2023-08-18 ENCOUNTER — APPOINTMENT (OUTPATIENT)
Dept: PHYSICAL THERAPY | Facility: CLINIC | Age: 43
End: 2023-08-18
Attending: STUDENT IN AN ORGANIZED HEALTH CARE EDUCATION/TRAINING PROGRAM
Payer: COMMERCIAL

## 2023-08-18 ENCOUNTER — APPOINTMENT (OUTPATIENT)
Dept: MRI IMAGING | Facility: CLINIC | Age: 43
End: 2023-08-18
Attending: STUDENT IN AN ORGANIZED HEALTH CARE EDUCATION/TRAINING PROGRAM
Payer: COMMERCIAL

## 2023-08-18 LAB
ANION GAP SERPL CALCULATED.3IONS-SCNC: 10 MMOL/L (ref 7–15)
BUN SERPL-MCNC: 14.3 MG/DL (ref 6–20)
CALCIUM SERPL-MCNC: 9.3 MG/DL (ref 8.6–10)
CHLORIDE SERPL-SCNC: 100 MMOL/L (ref 98–107)
CREAT SERPL-MCNC: 0.82 MG/DL (ref 0.67–1.17)
DEPRECATED HCO3 PLAS-SCNC: 29 MMOL/L (ref 22–29)
ERYTHROCYTE [DISTWIDTH] IN BLOOD BY AUTOMATED COUNT: 13.7 % (ref 10–15)
GFR SERPL CREATININE-BSD FRML MDRD: >90 ML/MIN/1.73M2
GLUCOSE SERPL-MCNC: 109 MG/DL (ref 70–99)
HCT VFR BLD AUTO: 32.7 % (ref 40–53)
HGB BLD-MCNC: 10.6 G/DL (ref 13.3–17.7)
MAGNESIUM SERPL-MCNC: 2.3 MG/DL (ref 1.7–2.3)
MCH RBC QN AUTO: 28.1 PG (ref 26.5–33)
MCHC RBC AUTO-ENTMCNC: 32.4 G/DL (ref 31.5–36.5)
MCV RBC AUTO: 87 FL (ref 78–100)
PLATELET # BLD AUTO: 315 10E3/UL (ref 150–450)
POTASSIUM SERPL-SCNC: 4.2 MMOL/L (ref 3.4–5.3)
RBC # BLD AUTO: 3.77 10E6/UL (ref 4.4–5.9)
SODIUM SERPL-SCNC: 139 MMOL/L (ref 136–145)
WBC # BLD AUTO: 9.7 10E3/UL (ref 4–11)

## 2023-08-18 PROCEDURE — 80048 BASIC METABOLIC PNL TOTAL CA: CPT

## 2023-08-18 PROCEDURE — 255N000002 HC RX 255 OP 636: Mod: JZ | Performed by: STUDENT IN AN ORGANIZED HEALTH CARE EDUCATION/TRAINING PROGRAM

## 2023-08-18 PROCEDURE — 99232 SBSQ HOSP IP/OBS MODERATE 35: CPT | Mod: GC | Performed by: INTERNAL MEDICINE

## 2023-08-18 PROCEDURE — 250N000011 HC RX IP 250 OP 636: Mod: JZ

## 2023-08-18 PROCEDURE — 250N000011 HC RX IP 250 OP 636: Mod: JZ | Performed by: STUDENT IN AN ORGANIZED HEALTH CARE EDUCATION/TRAINING PROGRAM

## 2023-08-18 PROCEDURE — 99232 SBSQ HOSP IP/OBS MODERATE 35: CPT | Mod: GC | Performed by: STUDENT IN AN ORGANIZED HEALTH CARE EDUCATION/TRAINING PROGRAM

## 2023-08-18 PROCEDURE — 250N000013 HC RX MED GY IP 250 OP 250 PS 637: Performed by: STUDENT IN AN ORGANIZED HEALTH CARE EDUCATION/TRAINING PROGRAM

## 2023-08-18 PROCEDURE — 97530 THERAPEUTIC ACTIVITIES: CPT | Mod: GP

## 2023-08-18 PROCEDURE — 70553 MRI BRAIN STEM W/O & W/DYE: CPT | Mod: 26 | Performed by: RADIOLOGY

## 2023-08-18 PROCEDURE — 70553 MRI BRAIN STEM W/O & W/DYE: CPT

## 2023-08-18 PROCEDURE — 93010 ELECTROCARDIOGRAM REPORT: CPT | Performed by: INTERNAL MEDICINE

## 2023-08-18 PROCEDURE — 85027 COMPLETE CBC AUTOMATED: CPT | Performed by: STUDENT IN AN ORGANIZED HEALTH CARE EDUCATION/TRAINING PROGRAM

## 2023-08-18 PROCEDURE — 250N000013 HC RX MED GY IP 250 OP 250 PS 637: Performed by: PHYSICIAN ASSISTANT

## 2023-08-18 PROCEDURE — 93005 ELECTROCARDIOGRAM TRACING: CPT

## 2023-08-18 PROCEDURE — 250N000013 HC RX MED GY IP 250 OP 250 PS 637

## 2023-08-18 PROCEDURE — 120N000005 HC R&B MS OVERFLOW UMMC

## 2023-08-18 PROCEDURE — 83735 ASSAY OF MAGNESIUM: CPT | Performed by: STUDENT IN AN ORGANIZED HEALTH CARE EDUCATION/TRAINING PROGRAM

## 2023-08-18 PROCEDURE — A9585 GADOBUTROL INJECTION: HCPCS | Mod: JZ | Performed by: STUDENT IN AN ORGANIZED HEALTH CARE EDUCATION/TRAINING PROGRAM

## 2023-08-18 RX ORDER — HYDROXYZINE HYDROCHLORIDE 25 MG/1
25 TABLET, FILM COATED ORAL EVERY 6 HOURS PRN
Status: DISCONTINUED | OUTPATIENT
Start: 2023-08-18 | End: 2023-08-24 | Stop reason: HOSPADM

## 2023-08-18 RX ORDER — HYDROXYZINE HYDROCHLORIDE 25 MG/1
50 TABLET, FILM COATED ORAL EVERY 6 HOURS PRN
Status: DISCONTINUED | OUTPATIENT
Start: 2023-08-18 | End: 2023-08-24 | Stop reason: HOSPADM

## 2023-08-18 RX ORDER — GADOBUTROL 604.72 MG/ML
7.5 INJECTION INTRAVENOUS ONCE
Status: COMPLETED | OUTPATIENT
Start: 2023-08-18 | End: 2023-08-18

## 2023-08-18 RX ADMIN — FENTANYL 1 PATCH: 50 PATCH TRANSDERMAL at 07:46

## 2023-08-18 RX ADMIN — OXYCODONE HYDROCHLORIDE 10 MG: 10 TABLET ORAL at 15:56

## 2023-08-18 RX ADMIN — METHOCARBAMOL 750 MG: 750 TABLET ORAL at 07:44

## 2023-08-18 RX ADMIN — HYDROXYZINE HYDROCHLORIDE 50 MG: 25 TABLET, FILM COATED ORAL at 19:52

## 2023-08-18 RX ADMIN — SENNOSIDES AND DOCUSATE SODIUM 1 TABLET: 8.6; 5 TABLET ORAL at 07:44

## 2023-08-18 RX ADMIN — GADOBUTROL 7.5 ML: 604.72 INJECTION INTRAVENOUS at 22:03

## 2023-08-18 RX ADMIN — Medication 10 ML: at 18:27

## 2023-08-18 RX ADMIN — Medication 25 MG: at 22:47

## 2023-08-18 RX ADMIN — ENOXAPARIN SODIUM 40 MG: 40 INJECTION SUBCUTANEOUS at 19:52

## 2023-08-18 RX ADMIN — PANTOPRAZOLE SODIUM 40 MG: 40 TABLET, DELAYED RELEASE ORAL at 07:44

## 2023-08-18 ASSESSMENT — ACTIVITIES OF DAILY LIVING (ADL)
ADLS_ACUITY_SCORE: 40
DEPENDENT_IADLS:: INDEPENDENT
ADLS_ACUITY_SCORE: 40

## 2023-08-18 NOTE — PROGRESS NOTES
Orthopaedic Surgery Progress Note 08/18/2023    S: Seen today with wife at bedside.  Doing well status post bronchoscopy/biopsy.  Only complication is wheezing.  Hip pain feels much better.  Has needed muscle relaxants x1.  No acute events overnight.  Tolerating diet.  PICC line inserted for chemo, oncology taking the lead with care.  POC reviewed. Questions answered.    O:  Temp: 98.7  F (37.1  C) Temp src: Axillary BP: 115/79 Pulse: 115   Resp: 16 SpO2: 95 % O2 Device: None (Room air) Oxygen Delivery: 1.5 LPM    Exam:  Gen: No acute distress, resting comfortably in bed.  Resp: Non-labored breathing  MSK:  LLE:  - Dressings c/d/I, slight firmness around incision area  - SILT femoral/tibial/sural/saphenous/DP/SP nerves  - Fires Quad, TA, EHL, FHL, GaSC  - PT/DP pulses 2+, foot wwp    Recent Labs   Lab 08/18/23  0400 08/17/23  0430 08/16/23  0647   WBC 9.7 7.3 6.9   HGB 10.6* 10.8* 11.3*    278 253     Assessment/Plan: Sarbjit Swain is a 43 year old male with PMH significant for BRCA1 genotype, medulloblastoma at age 4 s/p  shunt (treated here at [facility]),  rectal cancer diagnosed around 2 years ago s/p hemicolectomy, who presents with pathologic left femoral neck and acetabulum fracture with workup revealing 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 Avila.  Doing well from orthopedic perspective, now on [place] for oncological management.  Status post biopsy with pulmonology team.  No concerns about his hip status post transfers for biopsy.  Oncology team planning treatment.  PICC line in place.    - Primary:  Medicine primary  - Anticoagulation/DVT Prophylaxis: Lovenox 40mg qd  - Antibiotics/Tetanus: Ancef perioperatively  - X-rays/Imaging: MRI pelvis with and without contrast complete; biopsy lung lesion per primary team likely current today  - Bracing/Splinting: None  - Elevation: None  - Activity: bed to chair  - Weight  bearing: TTWB LLE for transfers  - Pain control: Use all oral meds first then escalate to IV, as ordered  - Diet: ADAT  - Cultures: None  - Consults Appreciated: Medicine, Oncology, Pathology  - Disposition: Pending oncological work-up  - Follow-up: TBD     Orthopedics will follow peripherally, please do not hesitate to reach out if there are any concerns about his hip.    --  Avila Daniel MD, PhD  Orthopedic Surgery PGY-1  [phone]    Please page me directly with any questions/concerns during regular weekday hours before 5pm. If there is no response, if it is a weekend, or if it is during evening hours, then please page the orthopedic surgery resident on call.

## 2023-08-18 NOTE — PROVIDER NOTIFICATION
Brooke Sanches MD notified of HR of 126 (paramaters to notify if >100)    Response: call back with EKG ordered

## 2023-08-18 NOTE — PLAN OF CARE
"A&OX4, tachycardic (126, MD notified, EKG ordered, per report: sinus tach), asymptomatic, OVSS, weaned to RA, tolerating well. Expiratory wheezes to right anterior lung throughout.  in place. Tolerating regular diet. Denies N/V. BM today, voids adequately. Up Ax2 with gait belt, walker, pivots. Received PRN robaxin x1 with relief, later oxycodone x1 with relief. Instructed on IS use, using appropriately. Fentanyl patch to left arm. Ice to left hip. PICC HL in right arm. CHG done. Good appetite. CMS intact. Turned, repositioned Q2H or more frequently. Up to chair x1. Worked with therapy. Resting between cares.     Problem: Plan of Care - These are the overarching goals to be used throughout the patient stay.    Goal: Plan of Care Review  Description: The Plan of Care Review/Shift note should be completed every shift.  The Outcome Evaluation is a brief statement about your assessment that the patient is improving, declining, or no change.  This information will be displayed automatically on your shift note.  Outcome: Progressing  Goal: Patient-Specific Goal (Individualized)  Description: You can add care plan individualizations to a care plan. Examples of Individualization might be:  \"Parent requests to be called daily at 9am for status\", \"I have a hard time hearing out of my right ear\", or \"Do not touch me to wake me up as it startles me\".  Outcome: Progressing  Goal: Absence of Hospital-Acquired Illness or Injury  Outcome: Progressing  Intervention: Identify and Manage Fall Risk  Recent Flowsheet Documentation  Taken 8/18/2023 1200 by Yani Plasencia RN  Safety Promotion/Fall Prevention:   activity supervised   assistive device/personal items within reach   clutter free environment maintained   increased rounding and observation   increase visualization of patient   lighting adjusted   nonskid shoes/slippers when out of bed   patient and family education   room organization consistent   safety round/check " completed   supervised activity  Taken 8/18/2023 1100 by Yani Plasencia RN  Safety Promotion/Fall Prevention: safety round/check completed  Taken 8/18/2023 0900 by Yani Plasencia RN  Safety Promotion/Fall Prevention:   activity supervised   assistive device/personal items within reach   clutter free environment maintained   increased rounding and observation   increase visualization of patient   lighting adjusted   nonskid shoes/slippers when out of bed   patient and family education   room organization consistent   safety round/check completed   supervised activity  Taken 8/18/2023 0715 by Yani Plasencia RN  Safety Promotion/Fall Prevention: safety round/check completed  Intervention: Prevent Skin Injury  Recent Flowsheet Documentation  Taken 8/18/2023 0900 by Yani Plasencia RN  Body Position:   position changed independently   turned   right  Intervention: Prevent and Manage VTE (Venous Thromboembolism) Risk  Recent Flowsheet Documentation  Taken 8/18/2023 0900 by Yani Plasencia RN  VTE Prevention/Management: SCDs (sequential compression devices) on  Intervention: Prevent Infection  Recent Flowsheet Documentation  Taken 8/18/2023 1200 by Yani Plasencia RN  Infection Prevention:   rest/sleep promoted   hand hygiene promoted   visitors restricted/screened   single patient room provided   equipment surfaces disinfected  Taken 8/18/2023 0900 by Yani Plasencia RN  Infection Prevention:   rest/sleep promoted   hand hygiene promoted   visitors restricted/screened   single patient room provided   equipment surfaces disinfected  Goal: Optimal Comfort and Wellbeing  Outcome: Progressing  Intervention: Monitor Pain and Promote Comfort  Recent Flowsheet Documentation  Taken 8/18/2023 0900 by Yani Plasencia RN  Pain Management Interventions:   medication (see MAR)   repositioned   rest  Goal: Readiness for Transition of Care  Outcome: Progressing     Problem: Hip Fracture Medical Management  Goal: Optimal Coping with Change in  Health Status  Outcome: Progressing  Goal: Absence of Bleeding  Outcome: Progressing  Goal: Effective Bowel Elimination  Outcome: Progressing  Goal: Baseline Cognitive Function Maintained  Outcome: Progressing  Goal: Absence of Embolism  Outcome: Progressing  Intervention: Prevent or Manage Embolism Risk  Recent Flowsheet Documentation  Taken 8/18/2023 0900 by Yani Plasencia RN  VTE Prevention/Management: SCDs (sequential compression devices) on  Goal: Fracture Stability  Outcome: Progressing  Goal: Optimal Functional Performance  Outcome: Progressing  Intervention: Promote Optimal Functional Status  Recent Flowsheet Documentation  Taken 8/18/2023 0900 by Yani Plasencia RN  Activity Management: activity adjusted per tolerance  Goal: Optimal Pain Control and Function  Outcome: Progressing  Intervention: Manage Acute Orthopaedic-Related Pain  Recent Flowsheet Documentation  Taken 8/18/2023 0900 by Yani Plasencia RN  Pain Management Interventions:   medication (see MAR)   repositioned   rest  Goal: Effective Urinary Elimination  Outcome: Progressing     Problem: Oral Intake Inadequate  Goal: Improved Oral Intake  Outcome: Progressing

## 2023-08-18 NOTE — PLAN OF CARE
"/61 (BP Location: Left arm)   Pulse 98   Temp 97.6  F (36.4  C) (Axillary)   Resp 18   Ht 1.6 m (5' 3\")   Wt 76.4 kg (168 lb 8 oz)   SpO2 99%   BMI 29.85 kg/m      Intermittently tachy, OVSS on 1.5 L NC. Alert and oriented, denies any hallucinations.  Denies nausea.  Reports pain is controlled, declined PRNs. Slept better with trazodone.  Voiding well, no BM overnight. Chemistry results still pending. Continue plan of care.     Problem: Hip Fracture Medical Management  Goal: Optimal Coping with Change in Health Status  Outcome: Progressing  Goal: Absence of Bleeding  Outcome: Progressing  Goal: Baseline Cognitive Function Maintained  Outcome: Progressing  Goal: Fracture Stability  Outcome: Progressing   Goal Outcome Evaluation:                        "

## 2023-08-18 NOTE — PROGRESS NOTES
[facility]    Medicine Progress Note - Hospitalist Service, GOLD TEAM 7    Date of Admission:  8/10/2023    Assessment & Plan   Sarbjit Swain is a 43M with hx of Greene's esophagus, BPH, medulloblastoma s/p  shunt, rectal cancer diagnosed 2021 s/p hemicolectomy and capecitabine partial therapy (discontinued due to an chest tightness reaction) and new osteosarcoma of the left ilium who was initially admitted on 8/10 after a pathologic left femoral neck and acetabulum fracture and underwent repair with ortho on 8/14. Now transferred to [place] on medicine team for ongoing management and initiation of chemotherapy.    Changes for today  -Health psychology   -Incentive Spirometry    Delirium ( Visual and Auditory Hallucinations)- improved  Acute Encephalopathy  Mentioned that he is having hallucination both auditory and visual after hospitalization which is new for him. He said that it has improved overall after getting better sleep. He has confusion which is his baseline.   DD: Delirium, Metabolic imbalance, Hypnagogic Hallucination,Infection, Brain Mets, Intracranial hemorhage, Drug  Most likely Delirium. No signs and symptoms of infection, has mild hyponatremia under treatment ( less likely). Has history of multiple cancers and stable brain mets.  On imaging, has no intracranial hemorrhage with multiple dural based extraaxial masses and vasogenic edema with right right parieto-occipital dural based mass grossly unchanged from previous imaging.    --- CTM mental status and metabolic status  --- Pain control  --- Avoid other hallucinogenic drug  --- Delirium precaution  --- Trazodone 25 mg HS  --- PT/OT    Pulmonary Lesions with concern for METS  Recent PetCT showed he has multiple bilateral FDG avid intrapulmonary and subpleural nodules and favours met over primary pulm malignancy with intrapulmonary mets. Following Bronch, has wheezing on his right lung. He is  stable and is able to wean off O2 this morning. Most likely irritation from bronch  --- IP radiology following                - Robotic Endobronchial Biopsy on 8/17  --- Oncology following: Biopsy report pending for chemo plan. PICC line placed. Port placement as an outpatient  --- Incentive Spirometry     Acute pathologic left femoral neck and acetabulum fracture  High grade Chondroblastic Osteosarcoma  Recently diagnosed osteosarcoma and PET CT concerning for metastatic disease to lungs .Suffered pathologic fracture on 8/9 while crawling around his home. Transferred from [place] for Orthopedics team consultation and likely procedure.  S/p closed reduction percutaneous pinning (CRPP) L hip on 8/14/23 with Dr. Avila.  [facility] for transfers .Transfer pt to [place] for inpatient chemotherapy initiation and to consult interventional pulmonology/radiology for biopsy of pulmonary lesions. Pt is accepted by SOC to be transferred to medicine team on 8/14 w/ ortho and oncology services following as a consult  --- CTM  --- Onco following: will start Chemo in this hospitalization - pending starting date from onco side. Plan depends on Pulm Biopsy report                             PMR for rehab consulted: recommended TCU                             PICC line placed                             Port placement as an outpatient  --- Fentanyl patch, oxycodone and Tylenol for pain  --- Health Psychology consulted for stress with new cancer and his health conditions    Sinus tachycardia- improved  Most likely pain and stress with less sleep. No chest pain or SOB or palpitation or hypoxia. Will do further work up if continues to have sinus tachy with resolving of current concerns.  --- Pain med  --- CTM    Mild Hyponatremia- improved  Most likely due to hypovolemia  Can also think of SIADH due to pain , stress, or mets, drug. Managed with fluid  --- CTM    Acute on chronic cancer related pain  ---   PTA on fentanyl patch plus  oxycodone as needed.  ---   Continue fentanyl patch, oxycodone PRN and Tylenol PRN     Hx of Multiple Cancers:         Stage II colorectal cancer s/p hemicolectomy now s/p re-anastomosis    Follows with Dr. Palma at [facility], last seen 8/7. Dx in 7/2021.  Underwent hemicolectomy 9/2021 followed by re-anastomosis in 12/2021.  ---   Follow up outpatient         Hx of medulloblastoma s/p  shunt   Diagnosed  At age 4. Underwent craniotomy with resection follow by radiation and chemotherapy.  Has stable right sided brain masses c/w meningiomas being followed by Neurosurgery.   --- will do a CT scan head today.        Hx of Multiple Dural based extraaxial masses ( most lilkely meningioma) and vasogenic edema        BRCA1 positive   Being followed by Oncology for hx of cancer.         Pulm lesions with concern for mets  As above       Chondroblastic Osteosarcoma  As above    Hx of hypothyroidism  Noted in chart. Per review, no abnormal TSH/T4 to fit with diagnosis. Not on pta medication      Diet: Snacks/Supplements Adult: Ensure Clear; Between Meals  Advance Diet as Tolerated: Full Liquid Diet    DVT Prophylaxis:  Held enoxaparin  Bran Catheter: Not present  Lines: PRESENT      PICC 08/17/23 Double Lumen Right Basilic Chemotherapy. PICC okay to use.-Site Assessment: WDL      Cardiac Monitoring: None  Code Status: Full Code    Expected Discharge Date:  TBD          eRe Mckay MD  Hospitalist Service, [facility] TEAM 7  [facility]  Securely message with [employer] (more info)  Text page via [facility] Paging/Directory   See signed in provider for up to date coverage information    Patient is seen by Dr. Steen  ______________________________________________________________________    Interval History   No acute events overnight. Mentioned that he got good sleep overnight. He mentioned that he is emotional with his current situation and is ok to see Counsellor for support. No  SOB, chest pain, cough, abdominal pain. Mentioned that he has left hip pain and other sites pain which are manageable with medication here.     Physical Exam   Vital Signs: Temp: 98.8  F (37.1  C) Temp src: Axillary BP: 113/78 Pulse: 99   Resp: 16 SpO2: 97 % O2 Device: None (Room air) Oxygen Delivery: 1.5 LPM  Weight: 168 lbs 8 oz  General Appearance: Awake. Alert and oriented x4. Laying in bed. Appears uncomfortable, no acute distress.  Eyes: Normal lids. Anicteric sclera. Pupils equal and round.  HEENT: Normocephalic. Nares patent. Mucous membranes moist.  Respiratory: wheezes present all over his anterior and posterior right lung field. No wheezes heared over left lung. No crackles heared.  Cardiovascular: Tachycardic, nl s1 and s2, no m/r/g. Pedal pulses 2+  GI: Non-distended abdomen. Normoactive. Soft.  Lymph/Hematologic: No abnormal or excessive bruising on exposed skin.  Skin: Warm, dry. No rashes on exposed skin.   Musculoskeletal: Moves upper extremities freely. Pain on palpation on the surgical site.  Neurologic: CN II-XII grossly intact.         Data     I have personally reviewed the following data over the past 24 hrs:    9.7  \   10.6 (L)   / 315     139 100 14.3 /  109 (H)   4.2 29 0.82 \       Imaging results reviewed over the past 24 hrs:   No results found for this or any previous visit (from the past 24 hour(s)).

## 2023-08-18 NOTE — PROGRESS NOTES
Solid Tumor Oncology Consult Service  Progress Note   Date of Service: 08/18/2023  Patient: Sarbjit Swain  MRN: [id number]  Admission Date: 8/10/2023  Hospital Day # 8  Cancer Diagnosis: Chondroblastic osteosarcoma  Primary Outpatient Oncologist: Kee Palam at [facility]  Current Treatment Plan: Chemotherapy to be started pending lung biopsy     Assessment & Plan:   Sarbjit is a 43 year old male with a history of BRCA1 mutation, medulloblastoma at at age 4 s/p  shunt and whole spine radiation, rectal cancer dx July 201 s/p hemicolectomy and CAPOX partial therapy (discontinued due to intolerance) who presents with pathologic left femoral neck and acetabulum fracture with workup revealing 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 He is now s/p internal fixation of his left hip with orthopedic surgery    The patient began experiencing left hip pain around April/May 2023. MRI of left hip on 6/30 revealed infiltrative metastatic lesion in the left acetabulum extending into the superior pubic ramus with associated nondisplaced acetabular fracture. Patient later had a PET on 7/21 that showed an FDG avid mass involving the left pelvis and proximal femur. PET also revealed multiple bilateral FDG avid intrapulmonary and subpleural nodules, favoring metastasis over primary pulmonary malignancy. Additionally, there was nonspecific FDG uptake along the gallbladder fundus and equivocal punctate focus of uptake in the subscapular left hepatic lobe. The patient underwent biopsy on 7/31 at [facility] and the biopsy later resulted as high-grade chondroblastic osteosarcoma. Meanwhile, the patient was transferred to the orthopedic service at [facility] from [facility] on 8/9 with a pathological subcapital fracture of the left femur with acetabular involvement; he is now s/p internal fixation of the hip on 8/14.     He had a bronchoscopy done on 8/17, results are pending. This will help with  "assessing if he has metastatic disease from osteosarcoma or prior colorectal cancer. We discussed with him tentative plan of starting chemotherapy (doxorubicin 75mg/m2 over 3 days and cisplatin 90 mg/m2 on day 1) in the next 1-2 days. We will follow up with pathology today. Given ongoing auditory hallucinations, we discussed getting a brain MRI. Patient in agreement.      Summary & Recommendations:   - Brain MRI with contrast  - Will follow up on pathology from bronchoscopy  - Anticipate to start chemotherapy in the next 24-48 hours    Patient was seen and plan of care was discussed with attending physician Dr. Oates.    Thank you for the opportunity to partake in this patient's plan of care. Please do not hesitate to page with questions. We will continue to follow along.     Bouchra Richmond MD  Hematology/Oncology Fellow  ___________________________________________________________________    Subjective & Interval History:    {Patient had lung biopsy done yesterday. Results pending. He continues to have intermittent hallucinations. He is eating well, drinking okay, no fevers, no new complaints. He met with PMR and they are planning on TCU.    Oncologic History:  History of medulloblastoma at age 4, treated with resection, whole brain radiation, and chemotherapy  Subsequently diagnosed with rectal cancer in 2021 with hemicolectomy and CAPOX for 3 months, which was discontinued due to side effects.  Now with new pathologic left hip fracture with biopsy showing high-grade chondroblastic osteosarcoma (biopsy 7/31/23).       Physical Exam:    Blood pressure 124/87, pulse (!) 126, temperature 98.6  F (37  C), temperature source Oral, resp. rate 16, height 1.6 m (5' 3\"), weight 76.4 kg (168 lb 8 oz), SpO2 99 %.    General: sitting up in bedside chair, appears somewhat fatigued  HEENT: sclera anicteric, EOMI  CV: RRR, normal S1/S2, no m/r/g  Resp: CTAB, no wheezing/crackles, normal respiratory effort on ambient air  GI: " soft, non-tender, non-distended, bowel sounds present and normoactive  MSK: warm and well-perfused, no lower extremity edema  Skin: no rashes on limited exam, no jaundice  Neuro: Alert and interactive, moves all extremities equally, no focal deficits    Labs & Studies: I personally reviewed the following studies:  ROUTINE LABS (Last four results):  CMP  Recent Labs   Lab 08/18/23  0648 08/17/23  0807 08/17/23  0430 08/14/23  0830     --  134* 132*   POTASSIUM 4.2  --  4.4 4.2   CHLORIDE 100  --  95* 93*   CO2 29  --  27 26   ANIONGAP 10  --  12 13   * 105* 114* 97   BUN 14.3  --  11.7 13.8   CR 0.82  --  0.84 0.87   GFRESTIMATED >90  --  >90 >90   JESSICA 9.3  --  9.2 9.9   MAG 2.3  --   --  2.1     CBC  Recent Labs   Lab 08/18/23  0400 08/17/23  0430 08/16/23  0647 08/15/23  0612   WBC 9.7 7.3 6.9 8.6   RBC 3.77* 3.79* 4.03* 3.91*   HGB 10.6* 10.8* 11.3* 11.2*   HCT 32.7* 32.7* 34.9* 33.2*   MCV 87 86 87 85   MCH 28.1 28.5 28.0 28.6   MCHC 32.4 33.0 32.4 33.7   RDW 13.7 13.9 14.1 13.7    278 253 215     INR  No lab results found in last 7 days.    Medications list for reference:  Current Facility-Administered Medications   Medication    acetaminophen (TYLENOL) tablet 650 mg    bisacodyl (DULCOLAX) suppository 10 mg    calcium carbonate (TUMS) chewable tablet 500 mg    enoxaparin ANTICOAGULANT (LOVENOX) injection 40 mg    fentaNYL (DURAGESIC) 50 mcg/hr 72 hr patch 1 patch    And    fentaNYL (DURAGESIC) Patch in Place    heparin lock flush 10 UNIT/ML injection 5-20 mL    heparin lock flush 10 UNIT/ML injection 5-20 mL    lidocaine (LMX4) cream    lidocaine (LMX4) cream    lidocaine 1 % 0.1-1 mL    lidocaine 1 % 0.1-5 mL    magnesium hydroxide (MILK OF MAGNESIA) suspension 30 mL    methocarbamol (ROBAXIN) tablet 750 mg    naloxone (NARCAN) injection 0.2 mg    Or    naloxone (NARCAN) injection 0.4 mg    Or    naloxone (NARCAN) injection 0.2 mg    Or    naloxone (NARCAN) injection 0.4 mg    ondansetron  (ZOFRAN ODT) ODT tab 4 mg    Or    ondansetron (ZOFRAN) injection 4 mg    oxyCODONE IR (ROXICODONE) tablet 10 mg    Or    oxyCODONE (ROXICODONE) tablet 15 mg    pantoprazole (PROTONIX) EC tablet 40 mg    polyethylene glycol (MIRALAX) Packet 17 g    prochlorperazine (COMPAZINE) injection 10 mg    Or    prochlorperazine (COMPAZINE) tablet 10 mg    senna-docusate (SENOKOT-S/PERICOLACE) 8.6-50 MG per tablet 1 tablet    sodium chloride (PF) 0.9% PF flush 10-20 mL    sodium chloride (PF) 0.9% PF flush 10-40 mL    sodium chloride (PF) 0.9% PF flush 3 mL    sodium chloride (PF) 0.9% PF flush 3 mL    traZODone (DESYREL) half-tab 25 mg

## 2023-08-18 NOTE — CONSULTS
Care Management Initial Consult    General Information  Assessment completed with: Patient,    Type of CM/SW Visit: Initial Assessment    Primary Care Provider verified and updated as needed: Yes   Readmission within the last 30 days: no previous admission in last 30 days      Reason for Consult: discharge planning  Advance Care Planning: Advance Care Planning Reviewed: concerns discussed          Communication Assessment  Patient's communication style: spoken language (English or Bilingual)    Hearing Difficulty or Deaf: no   Wear Glasses or Blind: no    Cognitive  Cognitive/Neuro/Behavioral: WDL  Level of Consciousness: alert  Arousal Level: opens eyes spontaneously  Orientation: oriented x 4  Mood/Behavior: cooperative, calm  Best Language: 0 - No aphasia  Speech: logical (per report pt tends to sundown in evening, currently A&O x4 and logical speech)    Living Environment:   People in home: spouse, child(pauline), dependent     Current living Arrangements: house      Able to return to prior arrangements: yes       Family/Social Support:  Care provided by: self, spouse/significant other  Provides care for: child(pauline)  Marital Status:   Wife, Other (specify) (Family & Friends)          Description of Support System:      Support Assessment: Adequate family and caregiver support    Current Resources:   Patient receiving home care services: No     Community Resources: None  Equipment currently used at home: walker, standard  Supplies currently used at home:      Employment/Financial:  Employment Status: employed full-time        Financial Concerns: No concerns identified   Referral to Financial Worker: No       Does the patient's insurance plan have a 3 day qualifying hospital stay waiver?  No    Lifestyle & Psychosocial Needs:  Social Determinants of Health     Tobacco Use: Not on file   Alcohol Use: Not on file   Financial Resource Strain: Not on file   Food Insecurity: Not on file   Transportation Needs: Not  on file   Physical Activity: Not on file   Stress: Not on file   Social Connections: Not on file   Intimate Partner Violence: Not on file   Depression: Not on file   Housing Stability: Not on file       Functional Status:  Prior to admission patient needed assistance:   Dependent ADLs:: Independent  Dependent IADLs:: Independent       Mental Health Status:  Mental Health Status: No Current Concerns       Chemical Dependency Status:  Chemical Dependency Status: No Current Concerns             Values/Beliefs:  Spiritual, Cultural Beliefs, Christianity Practices, Values that affect care: no               Additional Information:  Met with pt/family at bedside for CM assessment and discharge planning. Discussed TCU rec and pt agreed to post acute care at a TCU. Referral placed with  TCU.    Per Oncology team, pt's needing MRI and anticipated to have chemo on Monday 8/21 after lung biopsy result. Should pt have chemo on Monday, the next chemo course will be after 5wks and can be completed outpatient.     Discuss the need for ACP and provided pt/family with blank copies of HCD for his review and completion. CM will continue following to support discharge planning.    Pending:  [place] [id number] [place], [place] [address], [place] [address]  Phone: [phone]    [facility]  P: [phone]  Pager: [phone]  F: [phone]  Weekend Pager: [phone]  Weekend Coverage: 5A; 5B; 5C

## 2023-08-19 LAB
ALBUMIN SERPL BCG-MCNC: 3 G/DL (ref 3.5–5.2)
ALBUMIN SERPL BCG-MCNC: 3.1 G/DL (ref 3.5–5.2)
ALP SERPL-CCNC: 321 U/L (ref 40–129)
ALP SERPL-CCNC: 353 U/L (ref 40–129)
ALT SERPL W P-5'-P-CCNC: 141 U/L (ref 0–70)
ALT SERPL W P-5'-P-CCNC: 142 U/L (ref 0–70)
ANION GAP SERPL CALCULATED.3IONS-SCNC: 11 MMOL/L (ref 7–15)
ANION GAP SERPL CALCULATED.3IONS-SCNC: 12 MMOL/L (ref 7–15)
AST SERPL W P-5'-P-CCNC: 83 U/L (ref 0–45)
AST SERPL W P-5'-P-CCNC: 94 U/L (ref 0–45)
BASOPHILS # BLD AUTO: 0 10E3/UL (ref 0–0.2)
BASOPHILS # BLD AUTO: 0 10E3/UL (ref 0–0.2)
BASOPHILS NFR BLD AUTO: 0 %
BASOPHILS NFR BLD AUTO: 1 %
BILIRUB SERPL-MCNC: 0.4 MG/DL
BILIRUB SERPL-MCNC: 0.5 MG/DL
BUN SERPL-MCNC: 16.9 MG/DL (ref 6–20)
BUN SERPL-MCNC: 19 MG/DL (ref 6–20)
CALCIUM SERPL-MCNC: 9.2 MG/DL (ref 8.6–10)
CALCIUM SERPL-MCNC: 9.3 MG/DL (ref 8.6–10)
CHLORIDE SERPL-SCNC: 97 MMOL/L (ref 98–107)
CHLORIDE SERPL-SCNC: 98 MMOL/L (ref 98–107)
CREAT SERPL-MCNC: 0.81 MG/DL (ref 0.67–1.17)
CREAT SERPL-MCNC: 0.89 MG/DL (ref 0.67–1.17)
DEPRECATED HCO3 PLAS-SCNC: 27 MMOL/L (ref 22–29)
DEPRECATED HCO3 PLAS-SCNC: 28 MMOL/L (ref 22–29)
EOSINOPHIL # BLD AUTO: 0.2 10E3/UL (ref 0–0.7)
EOSINOPHIL # BLD AUTO: 0.3 10E3/UL (ref 0–0.7)
EOSINOPHIL NFR BLD AUTO: 2 %
EOSINOPHIL NFR BLD AUTO: 4 %
ERYTHROCYTE [DISTWIDTH] IN BLOOD BY AUTOMATED COUNT: 13.6 % (ref 10–15)
ERYTHROCYTE [DISTWIDTH] IN BLOOD BY AUTOMATED COUNT: 13.7 % (ref 10–15)
GFR SERPL CREATININE-BSD FRML MDRD: >90 ML/MIN/1.73M2
GFR SERPL CREATININE-BSD FRML MDRD: >90 ML/MIN/1.73M2
GLUCOSE SERPL-MCNC: 102 MG/DL (ref 70–99)
GLUCOSE SERPL-MCNC: 112 MG/DL (ref 70–99)
HCT VFR BLD AUTO: 33.1 % (ref 40–53)
HCT VFR BLD AUTO: 36.4 % (ref 40–53)
HGB BLD-MCNC: 10.9 G/DL (ref 13.3–17.7)
HGB BLD-MCNC: 11.6 G/DL (ref 13.3–17.7)
IMM GRANULOCYTES # BLD: 0.1 10E3/UL
IMM GRANULOCYTES # BLD: 0.1 10E3/UL
IMM GRANULOCYTES NFR BLD: 1 %
IMM GRANULOCYTES NFR BLD: 2 %
LYMPHOCYTES # BLD AUTO: 1.4 10E3/UL (ref 0.8–5.3)
LYMPHOCYTES # BLD AUTO: 2 10E3/UL (ref 0.8–5.3)
LYMPHOCYTES NFR BLD AUTO: 18 %
LYMPHOCYTES NFR BLD AUTO: 25 %
MAGNESIUM SERPL-MCNC: 2.1 MG/DL (ref 1.7–2.3)
MCH RBC QN AUTO: 27.8 PG (ref 26.5–33)
MCH RBC QN AUTO: 28.5 PG (ref 26.5–33)
MCHC RBC AUTO-ENTMCNC: 31.9 G/DL (ref 31.5–36.5)
MCHC RBC AUTO-ENTMCNC: 32.9 G/DL (ref 31.5–36.5)
MCV RBC AUTO: 86 FL (ref 78–100)
MCV RBC AUTO: 87 FL (ref 78–100)
MONOCYTES # BLD AUTO: 0.6 10E3/UL (ref 0–1.3)
MONOCYTES # BLD AUTO: 0.7 10E3/UL (ref 0–1.3)
MONOCYTES NFR BLD AUTO: 8 %
MONOCYTES NFR BLD AUTO: 9 %
NEUTROPHILS # BLD AUTO: 4.9 10E3/UL (ref 1.6–8.3)
NEUTROPHILS # BLD AUTO: 5.3 10E3/UL (ref 1.6–8.3)
NEUTROPHILS NFR BLD AUTO: 63 %
NEUTROPHILS NFR BLD AUTO: 67 %
NRBC # BLD AUTO: 0 10E3/UL
NRBC # BLD AUTO: 0 10E3/UL
NRBC BLD AUTO-RTO: 0 /100
NRBC BLD AUTO-RTO: 0 /100
PLATELET # BLD AUTO: 370 10E3/UL (ref 150–450)
PLATELET # BLD AUTO: 407 10E3/UL (ref 150–450)
POTASSIUM SERPL-SCNC: 3.8 MMOL/L (ref 3.4–5.3)
POTASSIUM SERPL-SCNC: 4 MMOL/L (ref 3.4–5.3)
PROT SERPL-MCNC: 6.2 G/DL (ref 6.4–8.3)
PROT SERPL-MCNC: 6.5 G/DL (ref 6.4–8.3)
RBC # BLD AUTO: 3.83 10E6/UL (ref 4.4–5.9)
RBC # BLD AUTO: 4.17 10E6/UL (ref 4.4–5.9)
SODIUM SERPL-SCNC: 135 MMOL/L (ref 136–145)
SODIUM SERPL-SCNC: 138 MMOL/L (ref 136–145)
WBC # BLD AUTO: 7.8 10E3/UL (ref 4–11)
WBC # BLD AUTO: 8 10E3/UL (ref 4–11)
WBC # BLD AUTO: 8 10E3/UL (ref 4–11)

## 2023-08-19 PROCEDURE — 250N000011 HC RX IP 250 OP 636: Performed by: INTERNAL MEDICINE

## 2023-08-19 PROCEDURE — 250N000011 HC RX IP 250 OP 636: Mod: JZ | Performed by: STUDENT IN AN ORGANIZED HEALTH CARE EDUCATION/TRAINING PROGRAM

## 2023-08-19 PROCEDURE — 3E04305 INTRODUCTION OF OTHER ANTINEOPLASTIC INTO CENTRAL VEIN, PERCUTANEOUS APPROACH: ICD-10-PCS | Performed by: STUDENT IN AN ORGANIZED HEALTH CARE EDUCATION/TRAINING PROGRAM

## 2023-08-19 PROCEDURE — 85025 COMPLETE CBC W/AUTO DIFF WBC: CPT | Performed by: INTERNAL MEDICINE

## 2023-08-19 PROCEDURE — 99233 SBSQ HOSP IP/OBS HIGH 50: CPT | Performed by: INTERNAL MEDICINE

## 2023-08-19 PROCEDURE — 250N000011 HC RX IP 250 OP 636: Mod: JZ

## 2023-08-19 PROCEDURE — 84155 ASSAY OF PROTEIN SERUM: CPT | Performed by: INTERNAL MEDICINE

## 2023-08-19 PROCEDURE — 258N000003 HC RX IP 258 OP 636: Performed by: INTERNAL MEDICINE

## 2023-08-19 PROCEDURE — 83735 ASSAY OF MAGNESIUM: CPT | Performed by: INTERNAL MEDICINE

## 2023-08-19 PROCEDURE — 120N000005 HC R&B MS OVERFLOW UMMC

## 2023-08-19 PROCEDURE — 84450 TRANSFERASE (AST) (SGOT): CPT | Performed by: INTERNAL MEDICINE

## 2023-08-19 PROCEDURE — 250N000013 HC RX MED GY IP 250 OP 250 PS 637: Performed by: PHYSICIAN ASSISTANT

## 2023-08-19 PROCEDURE — 250N000013 HC RX MED GY IP 250 OP 250 PS 637

## 2023-08-19 PROCEDURE — 99232 SBSQ HOSP IP/OBS MODERATE 35: CPT | Performed by: PEDIATRICS

## 2023-08-19 PROCEDURE — 250N000013 HC RX MED GY IP 250 OP 250 PS 637: Performed by: STUDENT IN AN ORGANIZED HEALTH CARE EDUCATION/TRAINING PROGRAM

## 2023-08-19 PROCEDURE — 80053 COMPREHEN METABOLIC PANEL: CPT | Performed by: STUDENT IN AN ORGANIZED HEALTH CARE EDUCATION/TRAINING PROGRAM

## 2023-08-19 PROCEDURE — 85027 COMPLETE CBC AUTOMATED: CPT | Performed by: STUDENT IN AN ORGANIZED HEALTH CARE EDUCATION/TRAINING PROGRAM

## 2023-08-19 RX ORDER — ALBUTEROL SULFATE 0.83 MG/ML
2.5 SOLUTION RESPIRATORY (INHALATION)
Status: DISCONTINUED | OUTPATIENT
Start: 2023-08-19 | End: 2023-08-23

## 2023-08-19 RX ORDER — EPINEPHRINE 1 MG/ML
0.3 INJECTION, SOLUTION, CONCENTRATE INTRAVENOUS EVERY 5 MIN PRN
Status: DISCONTINUED | OUTPATIENT
Start: 2023-08-19 | End: 2023-08-23

## 2023-08-19 RX ORDER — DIPHENHYDRAMINE HYDROCHLORIDE 50 MG/ML
50 INJECTION INTRAMUSCULAR; INTRAVENOUS
Status: DISCONTINUED | OUTPATIENT
Start: 2023-08-19 | End: 2023-08-23

## 2023-08-19 RX ORDER — HEPARIN SODIUM (PORCINE) LOCK FLUSH IV SOLN 100 UNIT/ML 100 UNIT/ML
5 SOLUTION INTRAVENOUS
Status: DISCONTINUED | OUTPATIENT
Start: 2023-08-19 | End: 2023-08-23

## 2023-08-19 RX ORDER — MEPERIDINE HYDROCHLORIDE 25 MG/ML
25 INJECTION INTRAMUSCULAR; INTRAVENOUS; SUBCUTANEOUS EVERY 30 MIN PRN
Status: DISCONTINUED | OUTPATIENT
Start: 2023-08-19 | End: 2023-08-23

## 2023-08-19 RX ORDER — METHYLPREDNISOLONE SODIUM SUCCINATE 125 MG/2ML
125 INJECTION, POWDER, LYOPHILIZED, FOR SOLUTION INTRAMUSCULAR; INTRAVENOUS
Status: DISCONTINUED | OUTPATIENT
Start: 2023-08-19 | End: 2023-08-23

## 2023-08-19 RX ORDER — ALBUTEROL SULFATE 90 UG/1
1-2 AEROSOL, METERED RESPIRATORY (INHALATION)
Status: DISCONTINUED | OUTPATIENT
Start: 2023-08-19 | End: 2023-08-23

## 2023-08-19 RX ORDER — DEXAMETHASONE 4 MG/1
8 TABLET ORAL DAILY
Status: COMPLETED | OUTPATIENT
Start: 2023-08-20 | End: 2023-08-23

## 2023-08-19 RX ORDER — LORAZEPAM 2 MG/ML
0.5 INJECTION INTRAMUSCULAR EVERY 4 HOURS PRN
Status: DISCONTINUED | OUTPATIENT
Start: 2023-08-19 | End: 2023-08-24 | Stop reason: HOSPADM

## 2023-08-19 RX ORDER — ONDANSETRON 8 MG/1
16 TABLET, FILM COATED ORAL ONCE
Status: COMPLETED | OUTPATIENT
Start: 2023-08-19 | End: 2023-08-19

## 2023-08-19 RX ORDER — HEPARIN SODIUM,PORCINE 10 UNIT/ML
5-20 VIAL (ML) INTRAVENOUS DAILY PRN
Status: DISCONTINUED | OUTPATIENT
Start: 2023-08-19 | End: 2023-08-23

## 2023-08-19 RX ADMIN — ONDANSETRON HYDROCHLORIDE 16 MG: 8 TABLET, FILM COATED ORAL at 15:50

## 2023-08-19 RX ADMIN — MAGNESIUM SULFATE HEPTAHYDRATE: 500 INJECTION, SOLUTION INTRAMUSCULAR; INTRAVENOUS at 19:30

## 2023-08-19 RX ADMIN — SODIUM CHLORIDE 1000 ML: 9 INJECTION, SOLUTION INTRAVENOUS at 15:41

## 2023-08-19 RX ADMIN — ACETAMINOPHEN 650 MG: 325 TABLET, FILM COATED ORAL at 03:55

## 2023-08-19 RX ADMIN — Medication 25 MG: at 21:51

## 2023-08-19 RX ADMIN — METHOCARBAMOL 750 MG: 750 TABLET ORAL at 08:20

## 2023-08-19 RX ADMIN — SODIUM CHLORIDE 45 MG: 0.9 INJECTION, SOLUTION INTRAVENOUS at 19:18

## 2023-08-19 RX ADMIN — ENOXAPARIN SODIUM 40 MG: 40 INJECTION SUBCUTANEOUS at 20:03

## 2023-08-19 RX ADMIN — CISPLATIN 166 MG: 1 INJECTION, SOLUTION INTRAVENOUS at 17:00

## 2023-08-19 RX ADMIN — Medication 5 ML: at 03:38

## 2023-08-19 RX ADMIN — PANTOPRAZOLE SODIUM 40 MG: 40 TABLET, DELAYED RELEASE ORAL at 08:17

## 2023-08-19 RX ADMIN — METHOCARBAMOL 750 MG: 750 TABLET ORAL at 15:55

## 2023-08-19 RX ADMIN — SENNOSIDES AND DOCUSATE SODIUM 1 TABLET: 8.6; 5 TABLET ORAL at 08:17

## 2023-08-19 RX ADMIN — Medication 10 ML: at 13:26

## 2023-08-19 RX ADMIN — FOSAPREPITANT: 150 INJECTION, POWDER, LYOPHILIZED, FOR SOLUTION INTRAVENOUS at 15:41

## 2023-08-19 RX ADMIN — ACETAMINOPHEN 650 MG: 325 TABLET, FILM COATED ORAL at 12:25

## 2023-08-19 ASSESSMENT — ACTIVITIES OF DAILY LIVING (ADL)
ADLS_ACUITY_SCORE: 45
ADLS_ACUITY_SCORE: 40
ADLS_ACUITY_SCORE: 45
ADLS_ACUITY_SCORE: 40
ADLS_ACUITY_SCORE: 40
ADLS_ACUITY_SCORE: 45
ADLS_ACUITY_SCORE: 45
ADLS_ACUITY_SCORE: 40
ADLS_ACUITY_SCORE: 45
ADLS_ACUITY_SCORE: 45
ADLS_ACUITY_SCORE: 40
ADLS_ACUITY_SCORE: 45

## 2023-08-19 NOTE — PROGRESS NOTES
Solid Tumor Oncology Consult Service  Progress Note   Date of Service: 08/19/2023  Patient: Sarbjit Swain  MRN: [id number]  Admission Date: 8/10/2023  Hospital Day # 9  Cancer Diagnosis: Chondroblastic osteosarcoma  Primary Outpatient Oncologist: Kee Palma at   Current Treatment Plan: Chemotherapy to be starting with cisplatin and doxorubicin    Assessment & Plan:   Sarbjit is a 43 year old male with a history of BRCA1 mutation, medulloblastoma at at age 4 s/p  shunt and whole spine radiation, rectal cancer dx July 201 s/p hemicolectomy and CAPOX partial therapy (discontinued due to intolerance) who presents with pathologic left femoral neck and acetabulum fracture with workup revealing 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 He is now s/p internal fixation of his left hip with orthopedic surgery    The patient began experiencing left hip pain around April/May 2023. MRI of left hip on 6/30 revealed infiltrative metastatic lesion in the left acetabulum extending into the superior pubic ramus with associated nondisplaced acetabular fracture. Patient later had a PET on 7/21 that showed an FDG avid mass involving the left pelvis and proximal femur. PET also revealed multiple bilateral FDG avid intrapulmonary and subpleural nodules, favoring metastasis over primary pulmonary malignancy. Additionally, there was nonspecific FDG uptake along the gallbladder fundus and equivocal punctate focus of uptake in the subscapular left hepatic lobe. The patient underwent biopsy on 7/31 at  and the biopsy later resulted as high-grade chondroblastic osteosarcoma. Meanwhile, the patient was transferred to the orthopedic service at [facility] from  on 8/9 with a pathological subcapital fracture of the left femur with acetabular involvement; he is now s/p internal fixation of the hip on 8/14.     He had a bronchoscopy done on 8/17 with biopsy pending.      Summary  & Recommendations:   - Brain MRI with contrast - report pending  - Will follow up on pathology from bronchoscopy  - We discussed that given patient has progressive disease involving the left hip, it would be beneficial to start chemotherapy sooner than later.  Lung biopsy is pending.  While this may be a different cancer such as his rectal cancer, given the rapid development of his chondroblastic osteosarcoma, it is likely the same process.  Based on this, we discussed initiating chemotherapy.  We discussed using doxorubicin 75mg/m2 over 3 days and cisplatin 90 mg/m2 on day 1. We discussed the side effects of chemotherapy including myelosuppression, nausea/vomiting/diarrhea/constipation, hair loss, neuropathy, fertility complications, dehydration, cardiomyopathy, etc.  The patient will be receiving chemotherapy teaching through my nurse coordinator with handouts describing all of the side effects again.  Consent for chemotherapy was obtained.    With this chemo regimen, patients can have nausea.  Emend, decadron and aloxi are prophylactically ordered.  It is reasonable to consider prn compazine, lorazepam.    Dianna Oates MD     ___________________________________________________________________    Subjective & Interval History:      Overall doing well.  + stools.  Pain under reasonable control.    He has questions about next steps    Oncologic History:  History of medulloblastoma at age 4, treated with resection, whole brain radiation, and no chemotherapy  Subsequently diagnosed with rectal cancer in 2021 with hemicolectomy and CAPOX for 3 months, which was discontinued due to side effects (in discussion, he developed cardiac vasospasms after taking oral Xeloda, no infusion reaction)  Now with new pathologic left hip fracture with biopsy showing high-grade chondroblastic osteosarcoma (biopsy 7/31/23).       Physical Exam:    Blood pressure 132/84, pulse 116, temperature 98.8  F (37.1  C), temperature source Oral,  "resp. rate 16, height 1.59 m (5' 2.6\"), weight 73.5 kg (162 lb), SpO2 93 %.    General: sitting up in bedside chair, appears somewhat fatigued  HEENT: sclera anicteric, EOMI  CV: RRR, normal S1/S2, no m/r/g  Resp: CTAB, no wheezing/crackles, normal respiratory effort on ambient air  GI: soft, non-tender, non-distended, bowel sounds present and normoactive  MSK: warm and well-perfused, no lower extremity edema  Skin: no rashes on limited exam, no jaundice  Neuro: Alert and interactive, moves all extremities equally, no focal deficits    Labs & Studies: I personally reviewed the following studies:  ROUTINE LABS (Last four results):  CMP  Recent Labs   Lab 08/19/23 0329 08/18/23  0648 08/17/23  0807 08/17/23  0430 08/14/23  0830    139  --  134* 132*   POTASSIUM 3.8 4.2  --  4.4 4.2   CHLORIDE 98 100  --  95* 93*   CO2 28 29  --  27 26   ANIONGAP 12 10  --  12 13   * 109* 105* 114* 97   BUN 19.0 14.3  --  11.7 13.8   CR 0.89 0.82  --  0.84 0.87   GFRESTIMATED >90 >90  --  >90 >90   JESSICA 9.2 9.3  --  9.2 9.9   MAG  --  2.3  --   --  2.1   PROTTOTAL 6.2*  --   --   --   --    ALBUMIN 3.0*  --   --   --   --    BILITOTAL 0.4  --   --   --   --    ALKPHOS 321*  --   --   --   --    AST 94*  --   --   --   --    *  --   --   --   --      CBC  Recent Labs   Lab 08/19/23 0329 08/18/23  0400 08/17/23  0430 08/16/23  0647   WBC 8.0 9.7 7.3 6.9   RBC 3.83* 3.77* 3.79* 4.03*   HGB 10.9* 10.6* 10.8* 11.3*   HCT 33.1* 32.7* 32.7* 34.9*   MCV 86 87 86 87   MCH 28.5 28.1 28.5 28.0   MCHC 32.9 32.4 33.0 32.4   RDW 13.6 13.7 13.9 14.1    315 278 253     INR  No lab results found in last 7 days.    Medications list for reference:  Current Facility-Administered Medications   Medication    acetaminophen (TYLENOL) tablet 650 mg    bisacodyl (DULCOLAX) suppository 10 mg    calcium carbonate (TUMS) chewable tablet 500 mg    enoxaparin ANTICOAGULANT (LOVENOX) injection 40 mg    fentaNYL (DURAGESIC) 50 mcg/hr 72 hr " patch 1 patch    And    fentaNYL (DURAGESIC) Patch in Place    heparin lock flush 10 UNIT/ML injection 5-20 mL    heparin lock flush 10 UNIT/ML injection 5-20 mL    hydrOXYzine (ATARAX) tablet 25 mg    Or    hydrOXYzine (ATARAX) tablet 50 mg    lidocaine (LMX4) cream    lidocaine (LMX4) cream    lidocaine 1 % 0.1-1 mL    lidocaine 1 % 0.1-5 mL    magnesium hydroxide (MILK OF MAGNESIA) suspension 30 mL    methocarbamol (ROBAXIN) tablet 750 mg    naloxone (NARCAN) injection 0.2 mg    Or    naloxone (NARCAN) injection 0.4 mg    Or    naloxone (NARCAN) injection 0.2 mg    Or    naloxone (NARCAN) injection 0.4 mg    ondansetron (ZOFRAN ODT) ODT tab 4 mg    Or    ondansetron (ZOFRAN) injection 4 mg    oxyCODONE IR (ROXICODONE) tablet 10 mg    Or    oxyCODONE (ROXICODONE) tablet 15 mg    pantoprazole (PROTONIX) EC tablet 40 mg    polyethylene glycol (MIRALAX) Packet 17 g    prochlorperazine (COMPAZINE) injection 10 mg    Or    prochlorperazine (COMPAZINE) tablet 10 mg    senna-docusate (SENOKOT-S/PERICOLACE) 8.6-50 MG per tablet 1 tablet    sodium chloride (PF) 0.9% PF flush 10-20 mL    sodium chloride (PF) 0.9% PF flush 10-40 mL    sodium chloride (PF) 0.9% PF flush 3 mL    sodium chloride (PF) 0.9% PF flush 3 mL    traZODone (DESYREL) half-tab 25 mg

## 2023-08-19 NOTE — PROGRESS NOTES
[facility]    Medicine Progress Note - Hospitalist Service, GOLD TEAM 7    Date of Admission:  8/10/2023    Assessment & Plan   Sarbjit Swain is a 43M with hx of Greene's esophagus, BPH, medulloblastoma s/p  shunt, rectal cancer diagnosed 2021 s/p hemicolectomy and capecitabine partial therapy (discontinued due to an chest tightness reaction) and new osteosarcoma of the left ilium who was initially admitted on 8/10 after a pathologic left femoral neck and acetabulum fracture and underwent repair with ortho on 8/14. Now transferred to [place] on medicine team for ongoing management and initiation of chemotherapy. Started chemotherapy.     Changes for today  - Started chemotherapy    Delirium ( Visual and Auditory Hallucinations)- resolved  Acute Encephalopathy -- resolved  Previously having visual and audio hallucinations this hospitalization. Also having baseline confusion. Imaging showed multiple dural-based masses with vasogenic edema that are unchanged from prior imaging. Most likely delirium.  - Delirium precuations  - Pain control  - Continue trazodone 25 mg at bedtime   - Continue PT/OT    Pulmonary Lesions   Recent PET showed bilateral lesions suspected to be mets. S/p bronch w/ biopsy on 8/17.   --- Oncology following, appreciate recs below  --- Incentive Spirometry     Acute pathologic left femoral neck and acetabulum fracture  High grade Chondroblastic Osteosarcoma  Recently diagnosed osteosarcoma and PET CT concerning for metastatic disease to lungs. Pathologic fracture on 8/9, so transferred from [place] for closed reduction with percutaneous pinning of the L hip (8/14/23 w/ Dr. Avila). [facility]WB for transfers.   - Onc consulted, appreciate recs   - Started doxorubicin/cisplatin today  - PM&R consulted, appreciate recs   - Recommend TCU  - PICC line placed, port to be placed outpatient   - Fentanyl patch, oxycodone and Tylenol for pain  - Health  Psychology consulted    Sinus tachycardia  Unclear etiology but has been stable overall.  - Continue routine vitals    Hyponatremia  Most likely SIADH vs malignancy related.  - Daily BMP    Acute on chronic cancer related pain  -Continue fentanyl patch, oxycodone PRN and Tylenol PRN     Hx of Multiple Cancers:         Stage II colorectal cancer s/p hemicolectomy now s/p re-anastomosis    Follows with Dr. Palma at [facility], last seen 8/7. Dx in 7/2021.  Underwent hemicolectomy 9/2021 followed by re-anastomosis in 12/2021.  ---   Follow up outpatient         Hx of medulloblastoma s/p  shunt   Diagnosed  At age 4. Underwent craniotomy with resection follow by radiation and chemotherapy.  Has stable right sided brain masses c/w meningiomas being followed by Neurosurgery.   --- will do a CT scan head today.        Hx of Multiple Dural based extraaxial masses ( most lilkely meningioma) and vasogenic edema        BRCA1 positive   Being followed by Oncology for hx of cancer.         Pulm lesions with concern for mets  As above       Chondroblastic Osteosarcoma  As above    Hx of hypothyroidism  Noted in chart. Per review, no abnormal TSH/T4 to fit with diagnosis. Not on pta medication      Diet: Snacks/Supplements Adult: Ensure Clear; Between Meals  Advance Diet as Tolerated: Regular Diet Adult    DVT Prophylaxis:  Held enoxaparin  Bran Catheter: Not present  Lines: PRESENT      PICC 08/17/23 Double Lumen Right Basilic Chemotherapy. PICC okay to use.-Site Assessment: WDL      Cardiac Monitoring: None  Code Status: Full Code    Expected Discharge Date:  TBD          BRE ALFARO MD  Hospitalist Service, GOLD TEAM [address] [place] [address]  Securely message with CRMnext (more info)  Text page via [employer] Paging/Directory   See signed in provider for up to date coverage information    ______________________________________________________________________    Interval History   No  acute events overnight. He has no nausea. He is still having ongoing pain but hasn't noticed significant changes. He is not having chest pain or difficulty breathing. He has no other concerns.     Physical Exam   Vital Signs: Temp: 98.8  F (37.1  C) Temp src: Oral BP: 132/84 Pulse: 116   Resp: 16 SpO2: 93 % O2 Device: None (Room air)    Weight: 162 lbs 0 oz  General Appearance: Awake. Alert and oriented x4. Laying in bed. Appears comfortable, no acute distress.  Eyes: EOMI.   HEENT: Normocephalic. Mucous membranes moist.  Respiratory: Comfortable work of breathing.   Cardiovascular: Warm, well-perfused  GI: Non-distended abdomen.   Lymph/Hematologic: No abnormal or excessive bruising on exposed skin.  Skin: Warm, dry. No rashes on exposed skin.   Musculoskeletal: Moves upper extremities freely.   Neurologic: Awake, alert, moving all extremities.         Data     I have personally reviewed the following data over the past 24 hrs:    7.8  \   11.6 (L)   / 407     135 (L) 97 (L) 16.9 /  112 (H)   4.0 27 0.81 \     ALT: 142 (H) AST: 83 (H) AP: 353 (H) TBILI: 0.5   ALB: 3.1 (L) TOT PROTEIN: 6.5 LIPASE: N/A       Imaging results reviewed over the past 24 hrs:   Recent Results (from the past 24 hour(s))   MR Brain w/o & w Contrast    Narrative    EXAM: MR BRAIN W/O & W CONTRAST  8/18/2023 10:32 PM     HISTORY: r/o brain mets, hx of high grade chondroblastic osteosarcoma,  known mets.       COMPARISON: CT 8/16/2023; outside brain MRI 4/17/2023    TECHNIQUE: Sagittal and axial T1-weighted, axial diffusion,  multiplanar T2 FLAIR with fat saturation images were obtained without  intravenous contrast. Following intravenous gadolinium-based contrast  administration, axial T2-weighted, axial susceptibility, and  T1-weighted images (in multiple planes) were obtained.    CONTRAST: 7.5 mls Gadavist.    FINDINGS:  No significant change in multiple enhancing dural based extra-axial  masses with the largest measuring 4.6 x 3.7 cm along  the right frontal  convexity with associated mass effect. Similar appearing vasogenic  edema associated with the right parieto-occipital dural based mass.    Scattered numerous cavernomas with associated susceptibility changes.  No intracranial hemorrhage. No acute infarct. Preserved intravascular  flow voids. Right frontal approach shunt catheter catheter terminates  near the foramen of Monro with stable ventricular size.    Suboccipital craniotomy. Mucous retention cysts in the maxillary  sinuses. Clear mastoid air cells. Nonfocal pituitary gland, sella,  skull base and upper cervical spinal structures. The orbits are  normal.      Impression    IMPRESSION:  1. Similar-appearing multiple dural based extra-axial masses with  continued vasogenic edema associated with the right parieto-occipital  dural based mass.  2. No new suspicious lesion since outside MRI dated 4/17/2023.  3. Stable shunted ventricular size.    I have personally reviewed the examination and initial interpretation  and I agree with the findings.    MATHEUS QUINTERO MD         SYSTEM ID:  [id number]

## 2023-08-20 LAB
ANION GAP SERPL CALCULATED.3IONS-SCNC: 9 MMOL/L (ref 7–15)
BUN SERPL-MCNC: 17.2 MG/DL (ref 6–20)
CALCIUM SERPL-MCNC: 8.4 MG/DL (ref 8.6–10)
CHLORIDE SERPL-SCNC: 104 MMOL/L (ref 98–107)
CREAT SERPL-MCNC: 0.65 MG/DL (ref 0.67–1.17)
DEPRECATED HCO3 PLAS-SCNC: 25 MMOL/L (ref 22–29)
ERYTHROCYTE [DISTWIDTH] IN BLOOD BY AUTOMATED COUNT: 13.7 % (ref 10–15)
GFR SERPL CREATININE-BSD FRML MDRD: >90 ML/MIN/1.73M2
GLUCOSE SERPL-MCNC: 148 MG/DL (ref 70–99)
HCT VFR BLD AUTO: 32.5 % (ref 40–53)
HGB BLD-MCNC: 10.4 G/DL (ref 13.3–17.7)
MCH RBC QN AUTO: 28.1 PG (ref 26.5–33)
MCHC RBC AUTO-ENTMCNC: 32 G/DL (ref 31.5–36.5)
MCV RBC AUTO: 88 FL (ref 78–100)
PLATELET # BLD AUTO: 362 10E3/UL (ref 150–450)
POTASSIUM SERPL-SCNC: 4.3 MMOL/L (ref 3.4–5.3)
RBC # BLD AUTO: 3.7 10E6/UL (ref 4.4–5.9)
SODIUM SERPL-SCNC: 138 MMOL/L (ref 136–145)
WBC # BLD AUTO: 5.7 10E3/UL (ref 4–11)

## 2023-08-20 PROCEDURE — 250N000011 HC RX IP 250 OP 636: Mod: JZ | Performed by: INTERNAL MEDICINE

## 2023-08-20 PROCEDURE — 250N000012 HC RX MED GY IP 250 OP 636 PS 637: Performed by: INTERNAL MEDICINE

## 2023-08-20 PROCEDURE — 250N000013 HC RX MED GY IP 250 OP 250 PS 637: Performed by: STUDENT IN AN ORGANIZED HEALTH CARE EDUCATION/TRAINING PROGRAM

## 2023-08-20 PROCEDURE — 250N000013 HC RX MED GY IP 250 OP 250 PS 637

## 2023-08-20 PROCEDURE — 250N000013 HC RX MED GY IP 250 OP 250 PS 637: Performed by: PHYSICIAN ASSISTANT

## 2023-08-20 PROCEDURE — 250N000011 HC RX IP 250 OP 636: Mod: JZ

## 2023-08-20 PROCEDURE — 85027 COMPLETE CBC AUTOMATED: CPT | Performed by: STUDENT IN AN ORGANIZED HEALTH CARE EDUCATION/TRAINING PROGRAM

## 2023-08-20 PROCEDURE — 80048 BASIC METABOLIC PNL TOTAL CA: CPT | Performed by: PEDIATRICS

## 2023-08-20 PROCEDURE — 120N000005 HC R&B MS OVERFLOW UMMC

## 2023-08-20 PROCEDURE — 250N000011 HC RX IP 250 OP 636: Mod: JZ | Performed by: PHYSICIAN ASSISTANT

## 2023-08-20 PROCEDURE — 99233 SBSQ HOSP IP/OBS HIGH 50: CPT | Performed by: INTERNAL MEDICINE

## 2023-08-20 PROCEDURE — 250N000011 HC RX IP 250 OP 636: Mod: JZ | Performed by: STUDENT IN AN ORGANIZED HEALTH CARE EDUCATION/TRAINING PROGRAM

## 2023-08-20 PROCEDURE — 99232 SBSQ HOSP IP/OBS MODERATE 35: CPT | Performed by: PEDIATRICS

## 2023-08-20 PROCEDURE — 258N000003 HC RX IP 258 OP 636: Performed by: INTERNAL MEDICINE

## 2023-08-20 RX ADMIN — Medication 5 ML: at 18:24

## 2023-08-20 RX ADMIN — ALTEPLASE 2 MG: 2.2 INJECTION, POWDER, LYOPHILIZED, FOR SOLUTION INTRAVENOUS at 21:24

## 2023-08-20 RX ADMIN — ACETAMINOPHEN 650 MG: 325 TABLET, FILM COATED ORAL at 17:48

## 2023-08-20 RX ADMIN — DEXAMETHASONE 8 MG: 4 TABLET ORAL at 08:35

## 2023-08-20 RX ADMIN — ENOXAPARIN SODIUM 40 MG: 40 INJECTION SUBCUTANEOUS at 20:01

## 2023-08-20 RX ADMIN — PANTOPRAZOLE SODIUM 40 MG: 40 TABLET, DELAYED RELEASE ORAL at 08:35

## 2023-08-20 RX ADMIN — METHOCARBAMOL 750 MG: 750 TABLET ORAL at 20:16

## 2023-08-20 RX ADMIN — Medication 25 MG: at 23:20

## 2023-08-20 RX ADMIN — SODIUM CHLORIDE 45 MG: 0.9 INJECTION, SOLUTION INTRAVENOUS at 19:15

## 2023-08-20 RX ADMIN — ACETAMINOPHEN 650 MG: 325 TABLET, FILM COATED ORAL at 11:55

## 2023-08-20 RX ADMIN — SENNOSIDES AND DOCUSATE SODIUM 1 TABLET: 8.6; 5 TABLET ORAL at 08:36

## 2023-08-20 RX ADMIN — OXYCODONE HYDROCHLORIDE 10 MG: 10 TABLET ORAL at 23:19

## 2023-08-20 ASSESSMENT — ACTIVITIES OF DAILY LIVING (ADL)
ADLS_ACUITY_SCORE: 43
ADLS_ACUITY_SCORE: 45

## 2023-08-20 NOTE — PLAN OF CARE
VSS. On room air. Up with x2 assist to transfer. Patient up in chair for most of day. To commode x2 with x2 soft bowel movements. Tylenol given x2 for pain. Positive blood return for doxorubicin every 4 hours. New bag to be hung this evening once prior bag is completed.  Problem: Plan of Care - These are the overarching goals to be used throughout the patient stay.    Goal: Absence of Hospital-Acquired Illness or Injury  Intervention: Identify and Manage Fall Risk  Recent Flowsheet Documentation  Taken 8/20/2023 0800 by Carmen Lemus RN  Safety Promotion/Fall Prevention:   activity supervised   room near nurse's station   clutter free environment maintained   lighting adjusted   room organization consistent     Problem: Plan of Care - These are the overarching goals to be used throughout the patient stay.    Goal: Absence of Hospital-Acquired Illness or Injury  Outcome: Progressing  Intervention: Identify and Manage Fall Risk  Recent Flowsheet Documentation  Taken 8/20/2023 0800 by Carmen Lemus RN  Safety Promotion/Fall Prevention:   activity supervised   room near nurse's station   clutter free environment maintained   lighting adjusted   room organization consistent  Intervention: Prevent Skin Injury  Recent Flowsheet Documentation  Taken 8/20/2023 0800 by Carmen Lemus RN  Body Position:   position changed independently   left   right   turned   semi-prone  Intervention: Prevent Infection  Recent Flowsheet Documentation  Taken 8/20/2023 0800 by Carmen Lemus RN  Infection Prevention:   single patient room provided   visitors restricted/screened   environmental surveillance performed   equipment surfaces disinfected   hand hygiene promoted   personal protective equipment utilized   rest/sleep promoted     Problem: Plan of Care - These are the overarching goals to be used throughout the patient stay.    Goal: Optimal Comfort and Wellbeing  Intervention: Monitor Pain and Promote Comfort  Recent Flowsheet  Documentation  Taken 8/20/2023 1240 by Carmen Lemus, RN  Pain Management Interventions: medication (see MAR)   Goal Outcome Evaluation:

## 2023-08-20 NOTE — PROGRESS NOTES
Chemo drug: cisplatin completed and doxorubicin continous infusion started  Tolerated: yes  Intervention: premeds given  Response: NA  Plan: monitor for side effects. Blood return checked.

## 2023-08-20 NOTE — PROGRESS NOTES
Solid Tumor Oncology Consult Service  Progress Note   Date of Service: 08/20/2023  Patient: Sarbjit Swain  MRN: [id number]  Admission Date: 8/10/2023  Hospital Day # 10  Cancer Diagnosis: Chondroblastic osteosarcoma  Primary Outpatient Oncologist: Kee Palma at [facility]  Current Treatment Plan: Chemotherapy started cisplatin and doxorubicin on 8/19    Assessment & Plan:   Sarbjit is a 43 year old male with a history of BRCA1 mutation, medulloblastoma at at age 4 s/p  shunt and whole spine radiation, rectal cancer dx July 201 s/p hemicolectomy and CAPOX partial therapy (discontinued due to intolerance) who presents with pathologic left femoral neck and acetabulum fracture with workup revealing 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 He is now s/p internal fixation of his left hip with orthopedic surgery    The patient began experiencing left hip pain around April/May 2023. MRI of left hip on 6/30 revealed infiltrative metastatic lesion in the left acetabulum extending into the superior pubic ramus with associated nondisplaced acetabular fracture. Patient later had a PET on 7/21 that showed an FDG avid mass involving the left pelvis and proximal femur. PET also revealed multiple bilateral FDG avid intrapulmonary and subpleural nodules, favoring metastasis over primary pulmonary malignancy. Additionally, there was nonspecific FDG uptake along the gallbladder fundus and equivocal punctate focus of uptake in the subscapular left hepatic lobe. The patient underwent biopsy on 7/31 at [facility] and the biopsy later resulted as high-grade chondroblastic osteosarcoma. Meanwhile, the patient was transferred to the orthopedic service at [facility] from [facility] on 8/9 with a pathological subcapital fracture of the left femur with acetabular involvement; he is now s/p internal fixation of the hip on 8/14.     He had a bronchoscopy done on 8/17 with biopsy pending.      Summary &  "Recommendations:   - Brain MRI with contrast -- this demonstrates multiple meningiomas which are common after childhood CNS radiation; will need to compare with outside imaging to look at how significant these changes are.  - Will follow up on pathology from bronchoscopy  - We discussed that given patient has progressive disease involving the left hip, we will continue chemotherapy for his chondroblastic osteosarcoma.  Discussed continuing doxorubicin 75mg/m2 over 3 days and cisplatin 90 mg/m2 on day 1.   Now day #2.     - Please check daily LFTs while on chemotherapy.  - Given his prior rectal cancer and ongoing loose stools, recommend psyllium 1-2 times daily.  This can help with bulking of stool; pt was on this as an outpatient.    With this chemo regimen, patients can have nausea.  Emend, decadron and aloxi are prophylactically ordered.  It is reasonable to consider prn compazine, lorazepam.    Dianna Oates MD     ___________________________________________________________________    Subjective & Interval History:      Overall doing well.  + stools.  Pain under reasonable control.    First night of chemotherapy has gone well.  No nausea.  No vomiting.    Oncologic History:  History of medulloblastoma at age 4, treated with resection, whole brain radiation, and no chemotherapy  Subsequently diagnosed with rectal cancer in 2021 with hemicolectomy and CAPOX for 3 months, which was discontinued due to side effects (in discussion, he developed cardiac vasospasms after taking oral Xeloda, no infusion reaction)  Now with new pathologic left hip fracture with biopsy showing high-grade chondroblastic osteosarcoma (biopsy 7/31/23).       Physical Exam:    Blood pressure 106/77, pulse 101, temperature 98.4  F (36.9  C), temperature source Axillary, resp. rate 16, height 1.59 m (5' 2.6\"), weight 73.5 kg (162 lb), SpO2 93 %.    General: sitting up in bedside chair, appears somewhat fatigued  HEENT: sclera anicteric, EOMI  CV: " RRR, normal S1/S2, no m/r/g  Resp: CTAB, no wheezing/crackles, normal respiratory effort on ambient air  GI: soft, non-tender, non-distended, bowel sounds present and normoactive  MSK: warm and well-perfused, no lower extremity edema  Skin: no rashes on limited exam, no jaundice  Neuro: Alert and interactive, moves all extremities equally, no focal deficits    Labs & Studies: I personally reviewed the following studies:  ROUTINE LABS (Last four results):  CMP  Recent Labs   Lab 08/20/23  0306 08/19/23  1325 08/19/23  0329 08/18/23  0648 08/17/23  0430 08/14/23  0830    135* 138 139   < > 132*   POTASSIUM 4.3 4.0 3.8 4.2   < > 4.2   CHLORIDE 104 97* 98 100   < > 93*   CO2 25 27 28 29   < > 26   ANIONGAP 9 11 12 10   < > 13   * 112* 102* 109*   < > 97   BUN 17.2 16.9 19.0 14.3   < > 13.8   CR 0.65* 0.81 0.89 0.82   < > 0.87   GFRESTIMATED >90 >90 >90 >90   < > >90   JESSICA 8.4* 9.3 9.2 9.3   < > 9.9   MAG  --  2.1  --  2.3  --  2.1   PROTTOTAL  --  6.5 6.2*  --   --   --    ALBUMIN  --  3.1* 3.0*  --   --   --    BILITOTAL  --  0.5 0.4  --   --   --    ALKPHOS  --  353* 321*  --   --   --    AST  --  83* 94*  --   --   --    ALT  --  142* 141*  --   --   --     < > = values in this interval not displayed.     CBC  Recent Labs   Lab 08/20/23 0306 08/19/23  1325 08/19/23  0329 08/18/23  0400   WBC 5.7 7.8 8.0  8.0 9.7   RBC 3.70* 4.17* 3.83* 3.77*   HGB 10.4* 11.6* 10.9* 10.6*   HCT 32.5* 36.4* 33.1* 32.7*   MCV 88 87 86 87   MCH 28.1 27.8 28.5 28.1   MCHC 32.0 31.9 32.9 32.4   RDW 13.7 13.7 13.6 13.7    407 370 315     INR  No lab results found in last 7 days.    Medications list for reference:  Current Facility-Administered Medications   Medication    0.9% sodium chloride BOLUS    acetaminophen (TYLENOL) tablet 650 mg    albuterol (PROVENTIL HFA/VENTOLIN HFA) inhaler    albuterol (PROVENTIL) neb solution 2.5 mg    alteplase (CATHFLO ACTIVASE) injection 2 mg    bisacodyl (DULCOLAX) suppository 10 mg     calcium carbonate (TUMS) chewable tablet 500 mg    dexAMETHasone (DECADRON) tablet 8 mg    diphenhydrAMINE (BENADRYL) injection 50 mg    DOXOrubicin (ADRIAMYCIN) 45 mg in sodium chloride 0.9 % 1,072.5 mL infusion    enoxaparin ANTICOAGULANT (LOVENOX) injection 40 mg    EPINEPHrine PF (ADRENALIN) injection 0.3 mg    famotidine (PEPCID) injection 20 mg    famotidine (PEPCID) injection 20 mg    fentaNYL (DURAGESIC) 50 mcg/hr 72 hr patch 1 patch    And    fentaNYL (DURAGESIC) Patch in Place    heparin 100 unit/mL injection 5 mL    heparin lock flush 10 UNIT/ML injection 5-20 mL    heparin lock flush 10 UNIT/ML injection 5-20 mL    heparin lock flush 10 UNIT/ML injection 5-20 mL    hydrOXYzine (ATARAX) tablet 25 mg    Or    hydrOXYzine (ATARAX) tablet 50 mg    lidocaine (LMX4) cream    lidocaine (LMX4) cream    lidocaine 1 % 0.1-1 mL    lidocaine 1 % 0.1-5 mL    LORazepam (ATIVAN) injection 0.5 mg    magnesium hydroxide (MILK OF MAGNESIA) suspension 30 mL    meperidine (DEMEROL) injection 25 mg    methocarbamol (ROBAXIN) tablet 750 mg    methylPREDNISolone sodium succinate (solu-MEDROL) injection 125 mg    naloxone (NARCAN) injection 0.2 mg    Or    naloxone (NARCAN) injection 0.4 mg    Or    naloxone (NARCAN) injection 0.2 mg    Or    naloxone (NARCAN) injection 0.4 mg    ondansetron (ZOFRAN ODT) ODT tab 4 mg    Or    ondansetron (ZOFRAN) injection 4 mg    oxyCODONE IR (ROXICODONE) tablet 10 mg    Or    oxyCODONE (ROXICODONE) tablet 15 mg    pantoprazole (PROTONIX) EC tablet 40 mg    polyethylene glycol (MIRALAX) Packet 17 g    prochlorperazine (COMPAZINE) injection 10 mg    Or    prochlorperazine (COMPAZINE) tablet 10 mg    senna-docusate (SENOKOT-S/PERICOLACE) 8.6-50 MG per tablet 1 tablet    sodium chloride (PF) 0.9% PF flush 10-20 mL    sodium chloride (PF) 0.9% PF flush 10-40 mL    sodium chloride (PF) 0.9% PF flush 3 mL    sodium chloride (PF) 0.9% PF flush 3 mL    sodium chloride (PF) 0.9% PF flush 3-20 mL     traZODone (DESYREL) half-tab 25 mg

## 2023-08-20 NOTE — PLAN OF CARE
"Goal Outcome Evaluation:       Assumed care from 23:00 to 07:30.     Blood pressure 110/71, pulse 93, temperature 97.8  F (36.6  C), resp. rate 16, height 1.59 m (5' 2.6\"), weight 73.5 kg (162 lb), SpO2 95 %.    AVSS, A/O x 4 speech is slightly slow but appropriate. Pt is on 2 L of 02 NC overnight and saturating well. Pt had x 1 episode of incontinent on stool at the beginning of the shift. Pt was given good delmy care. Pt is on chemo, Doxorubicin drip at 44.7 ML/hour. He had positive blood returns and tolerating chemo well. Pt is using urinal in bed and voiding freely. He denies Pain/N/V/D. Caps and lines due to be changed today. Continue to monitor pt and follow plan of care.      Problem: Plan of Care - These are the overarching goals to be used throughout the patient stay.    Goal: Plan of Care Review  Description: The Plan of Care Review/Shift note should be completed every shift.  The Outcome Evaluation is a brief statement about your assessment that the patient is improving, declining, or no change.  This information will be displayed automatically on your shift note.  Outcome: Progressing     Problem: Plan of Care - These are the overarching goals to be used throughout the patient stay.    Goal: Patient-Specific Goal (Individualized)  Description: You can add care plan individualizations to a care plan. Examples of Individualization might be:  \"Parent requests to be called daily at 9am for status\", \"I have a hard time hearing out of my right ear\", or \"Do not touch me to wake me up as it startles me\".  Outcome: Progressing     Problem: Plan of Care - These are the overarching goals to be used throughout the patient stay.    Goal: Absence of Hospital-Acquired Illness or Injury  Outcome: Progressing  Intervention: Identify and Manage Fall Risk  Recent Flowsheet Documentation  Taken 8/20/2023 0400 by Lynne Isaac RN  Safety Promotion/Fall Prevention:   activity supervised   room near nurse's station   " clutter free environment maintained   lighting adjusted   room organization consistent  Taken 8/20/2023 0000 by Lynne Isaac RN  Safety Promotion/Fall Prevention:   activity supervised   room near nurse's station   clutter free environment maintained   lighting adjusted   room organization consistent  Intervention: Prevent Skin Injury  Recent Flowsheet Documentation  Taken 8/20/2023 0400 by Lynne Isaac RN  Body Position:   position changed independently   left   right   turned   semi-prone  Taken 8/20/2023 0000 by Lynne Isaac RN  Body Position:   position changed independently   left   right   turned   semi-prone  Intervention: Prevent Infection  Recent Flowsheet Documentation  Taken 8/20/2023 0400 by Lynne Isaac RN  Infection Prevention:   single patient room provided   visitors restricted/screened   environmental surveillance performed   equipment surfaces disinfected   hand hygiene promoted   personal protective equipment utilized   rest/sleep promoted  Taken 8/20/2023 0000 by Lynne Isaac RN  Infection Prevention:   single patient room provided   visitors restricted/screened   environmental surveillance performed   equipment surfaces disinfected   hand hygiene promoted   personal protective equipment utilized   rest/sleep promoted     Problem: Plan of Care - These are the overarching goals to be used throughout the patient stay.    Goal: Absence of Hospital-Acquired Illness or Injury  Intervention: Identify and Manage Fall Risk  Recent Flowsheet Documentation  Taken 8/20/2023 0400 by Lynne Isaac RN  Safety Promotion/Fall Prevention:   activity supervised   room near nurse's station   clutter free environment maintained   lighting adjusted   room organization consistent  Taken 8/20/2023 0000 by Lynne Isaac RN  Safety Promotion/Fall Prevention:   activity supervised   room near nurse's station   clutter free environment maintained   lighting adjusted   room  organization consistent     Problem: Plan of Care - These are the overarching goals to be used throughout the patient stay.    Goal: Absence of Hospital-Acquired Illness or Injury  Intervention: Prevent Skin Injury  Recent Flowsheet Documentation  Taken 8/20/2023 0400 by Lynne Isaac RN  Body Position:   position changed independently   left   right   turned   semi-prone  Taken 8/20/2023 0000 by Lynne Isaac RN  Body Position:   position changed independently   left   right   turned   semi-prone     Problem: Plan of Care - These are the overarching goals to be used throughout the patient stay.    Goal: Optimal Comfort and Wellbeing  Outcome: Progressing     Problem: Hip Fracture Medical Management  Goal: Optimal Coping with Change in Health Status  Outcome: Progressing     Problem: Hip Fracture Medical Management  Goal: Absence of Bleeding  Outcome: Progressing     Problem: Hip Fracture Medical Management  Goal: Effective Bowel Elimination  Outcome: Progressing     Problem: Hip Fracture Medical Management  Goal: Optimal Functional Performance  Intervention: Promote Optimal Functional Status  Recent Flowsheet Documentation  Taken 8/20/2023 0400 by Lynne Isaac RN  Activity Management: activity adjusted per tolerance  Taken 8/20/2023 0000 by Lynne Isaac RN  Activity Management: activity adjusted per tolerance     Problem: Hip Fracture Medical Management  Goal: Optimal Functional Performance  Outcome: Progressing  Intervention: Promote Optimal Functional Status  Recent Flowsheet Documentation  Taken 8/20/2023 0400 by Lynne Isaac RN  Activity Management: activity adjusted per tolerance  Taken 8/20/2023 0000 by Lynne Isaac RN  Activity Management: activity adjusted per tolerance     Problem: Hip Fracture Medical Management  Goal: Fracture Stability  Outcome: Progressing     Problem: Hip Fracture Medical Management  Goal: Absence of Embolism  Outcome: Progressing

## 2023-08-20 NOTE — PROGRESS NOTES
[facility]    Medicine Progress Note - Hospitalist Service, GOLD TEAM 7    Date of Admission:  8/10/2023    Assessment & Plan   Sarbjit Swain is a 43M with hx of Greene's esophagus, BPH, medulloblastoma s/p  shunt, rectal cancer diagnosed 2021 s/p hemicolectomy and capecitabine partial therapy (discontinued due to an chest tightness reaction) and new osteosarcoma of the left ilium who was initially admitted on 8/10 after a pathologic left femoral neck and acetabulum fracture and underwent repair with ortho on 8/14. Now transferred to [place] on medicine team for ongoing management and initiation of chemotherapy. On day 2 of chemotherapy.     Changes for today  - Continuing chemotherapy. He is not having nausea.     Delirium ( Visual and Auditory Hallucinations)- resolved  Acute Encephalopathy -- resolved  Previously having visual and audio hallucinations this hospitalization. Also having baseline confusion. Imaging showed multiple dural-based masses with vasogenic edema that are unchanged from prior imaging. Most likely delirium.  - Delirium precuations  - Pain control  - Continue trazodone 25 mg at bedtime   - Continue PT/OT    Pulmonary Lesions   Recent PET showed bilateral lesions suspected to be mets. S/p bronch w/ biopsy on 8/17.   --- Oncology following, appreciate recs below  --- Incentive Spirometry     Acute pathologic left femoral neck and acetabulum fracture  High grade Chondroblastic Osteosarcoma  Recently diagnosed osteosarcoma and PET CT concerning for metastatic disease to lungs. Pathologic fracture on 8/9, so transferred from [facility] for closed reduction with percutaneous pinning of the L hip (8/14/23 w/ Dr. Avila). TTWB for transfers.   - Onc consulted, appreciate recs   - Started doxorubicin/cisplatin today  - PM&R consulted, appreciate recs   - Recommend TCU  - PICC line placed, port to be placed outpatient   - Fentanyl patch, oxycodone and  Tylenol for pain  - Health Psychology consulted    Sinus tachycardia  Unclear etiology but has been stable overall.  - Continue routine vitals    Hyponatremia  Most likely SIADH vs malignancy related.  - Daily BMP    Acute on chronic cancer related pain  -Continue fentanyl patch, oxycodone PRN and Tylenol PRN     Hx of Multiple Cancers:         Stage II colorectal cancer s/p hemicolectomy now s/p re-anastomosis    Follows with Dr. Palma at [facility], last seen 8/7. Dx in 7/2021.  Underwent hemicolectomy 9/2021 followed by re-anastomosis in 12/2021.  ---   Follow up outpatient         Hx of medulloblastoma s/p  shunt   Diagnosed  At age 4. Underwent craniotomy with resection follow by radiation and chemotherapy.  Has stable right sided brain masses c/w meningiomas being followed by Neurosurgery.   --- will do a CT scan head today.        Hx of Multiple Dural based extraaxial masses ( most lilkely meningioma) and vasogenic edema        BRCA1 positive   Being followed by Oncology for hx of cancer.         Pulm lesions with concern for mets  As above       Chondroblastic Osteosarcoma  As above    Hx of hypothyroidism  Noted in chart. Per review, no abnormal TSH/T4 to fit with diagnosis. Not on pta medication      Diet: Snacks/Supplements Adult: Ensure Clear; Between Meals  Advance Diet as Tolerated: Regular Diet Adult    DVT Prophylaxis:  Held enoxaparin  Bran Catheter: Not present  Lines: PRESENT      PICC 08/17/23 Double Lumen Right Basilic Chemotherapy. PICC okay to use.-Site Assessment: WDL      Cardiac Monitoring: None  Code Status: Full Code    Expected Discharge Date:  TBD          BRE ALFARO MD  Hospitalist Service, GOLD TEAM [address] [place] [address]  Securely message with gAuto (more info)  Text page via [employer] Paging/Directory   See signed in provider for up to date coverage  information    ______________________________________________________________________    Interval History   No acute events overnight. He has not had nausea since starting chemotherapy. He does have some buttock pain due to being in bed for most of the day, but he has been working on chair transfers and working with PT.     Physical Exam   Vital Signs: Temp: 98.4  F (36.9  C) Temp src: Axillary BP: 106/77 Pulse: 101   Resp: 16 SpO2: 97 % O2 Device: Nasal cannula Oxygen Delivery: 2 LPM  Weight: 162 lbs 0 oz  General Appearance: Awake. Alert and oriented x4. Sitting in bedside chair. Appears comfortable, no acute distress.  Eyes: EOMI.   HEENT: Normocephalic. Mucous membranes moist.  Respiratory: Comfortable work of breathing.   Cardiovascular: Warm, well-perfused  GI: Non-distended abdomen.   Lymph/Hematologic: No abnormal or excessive bruising on exposed skin.  Skin: Warm, dry. No rashes on exposed skin.   Neurologic: Awake, alert, moving upper extremities.         Data     I have personally reviewed the following data over the past 24 hrs:    5.7  \   10.4 (L)   / 362     138 104 17.2 /  148 (H)   4.3 25 0.65 (L) \     ALT: 142 (H) AST: 83 (H) AP: 353 (H) TBILI: 0.5   ALB: 3.1 (L) TOT PROTEIN: 6.5 LIPASE: N/A       Imaging results reviewed over the past 24 hrs:   No results found for this or any previous visit (from the past 24 hour(s)).

## 2023-08-20 NOTE — PLAN OF CARE
"/87 (BP Location: Left arm, Cuff Size: Adult Regular)   Pulse 108   Temp 97.8  F (36.6  C) (Axillary)   Resp 16   Ht 1.59 m (5' 2.6\")   Wt 73.5 kg (162 lb)   SpO2 95%   BMI 29.07 kg/m       Neuro: A&Ox4.   Cardiac: SR. VSS.   Respiratory: Sating at 97 on 2 L of oxygen.  GI/:  denies n/v. Adequate urine output. No BM  Diet/appetite: regular diet.  Activity:  Assist of  2 with cares.  Pain: left hip pain of 3/10, denies intervention.   Skin: No new deficits noted. Fentanyl patch still in place on the left upper arm.   LDA's: R-PICC infusing (both red and purple lumen).     Plan: Continue with POC. Notify primary team with changes.     Problem: Plan of Care - These are the overarching goals to be used throughout the patient stay.    Goal: Plan of Care Review  Description: The Plan of Care Review/Shift note should be completed every shift.  The Outcome Evaluation is a brief statement about your assessment that the patient is improving, declining, or no change.  This information will be displayed automatically on your shift note.  Outcome: Progressing     Problem: Plan of Care - These are the overarching goals to be used throughout the patient stay.    Goal: Patient-Specific Goal (Individualized)  Description: You can add care plan individualizations to a care plan. Examples of Individualization might be:  \"Parent requests to be called daily at 9am for status\", \"I have a hard time hearing out of my right ear\", or \"Do not touch me to wake me up as it startles me\".  Outcome: Progressing   Goal Outcome Evaluation:                        "

## 2023-08-21 ENCOUNTER — APPOINTMENT (OUTPATIENT)
Dept: PHYSICAL THERAPY | Facility: CLINIC | Age: 43
End: 2023-08-21
Attending: STUDENT IN AN ORGANIZED HEALTH CARE EDUCATION/TRAINING PROGRAM
Payer: COMMERCIAL

## 2023-08-21 LAB
ALBUMIN SERPL BCG-MCNC: 3 G/DL (ref 3.5–5.2)
ALP SERPL-CCNC: 301 U/L (ref 40–129)
ALT SERPL W P-5'-P-CCNC: 137 U/L (ref 0–70)
ANION GAP SERPL CALCULATED.3IONS-SCNC: 10 MMOL/L (ref 7–15)
AST SERPL W P-5'-P-CCNC: 50 U/L (ref 0–45)
BILIRUB DIRECT SERPL-MCNC: <0.2 MG/DL (ref 0–0.3)
BILIRUB SERPL-MCNC: 0.3 MG/DL
BUN SERPL-MCNC: 21.6 MG/DL (ref 6–20)
CALCIUM SERPL-MCNC: 8.7 MG/DL (ref 8.6–10)
CHLORIDE SERPL-SCNC: 104 MMOL/L (ref 98–107)
CREAT SERPL-MCNC: 0.78 MG/DL (ref 0.67–1.17)
DEPRECATED HCO3 PLAS-SCNC: 24 MMOL/L (ref 22–29)
ERYTHROCYTE [DISTWIDTH] IN BLOOD BY AUTOMATED COUNT: 13.4 % (ref 10–15)
GFR SERPL CREATININE-BSD FRML MDRD: >90 ML/MIN/1.73M2
GLUCOSE SERPL-MCNC: 105 MG/DL (ref 70–99)
HCT VFR BLD AUTO: 32.1 % (ref 40–53)
HGB BLD-MCNC: 10.2 G/DL (ref 13.3–17.7)
MCH RBC QN AUTO: 28 PG (ref 26.5–33)
MCHC RBC AUTO-ENTMCNC: 31.8 G/DL (ref 31.5–36.5)
MCV RBC AUTO: 88 FL (ref 78–100)
PATH REPORT.COMMENTS IMP SPEC: ABNORMAL
PATH REPORT.COMMENTS IMP SPEC: YES
PATH REPORT.FINAL DX SPEC: ABNORMAL
PATH REPORT.GROSS SPEC: ABNORMAL
PATH REPORT.MICROSCOPIC SPEC OTHER STN: ABNORMAL
PATH REPORT.RELEVANT HX SPEC: ABNORMAL
PLATELET # BLD AUTO: 463 10E3/UL (ref 150–450)
POTASSIUM SERPL-SCNC: 3.8 MMOL/L (ref 3.4–5.3)
PROT SERPL-MCNC: 5.9 G/DL (ref 6.4–8.3)
RBC # BLD AUTO: 3.64 10E6/UL (ref 4.4–5.9)
SODIUM SERPL-SCNC: 138 MMOL/L (ref 136–145)
WBC # BLD AUTO: 11.8 10E3/UL (ref 4–11)

## 2023-08-21 PROCEDURE — 88321 CONSLTJ&REPRT SLD PREP ELSWR: CPT | Performed by: PATHOLOGY

## 2023-08-21 PROCEDURE — 85027 COMPLETE CBC AUTOMATED: CPT | Performed by: STUDENT IN AN ORGANIZED HEALTH CARE EDUCATION/TRAINING PROGRAM

## 2023-08-21 PROCEDURE — 120N000005 HC R&B MS OVERFLOW UMMC

## 2023-08-21 PROCEDURE — 250N000011 HC RX IP 250 OP 636: Mod: JZ | Performed by: INTERNAL MEDICINE

## 2023-08-21 PROCEDURE — 99232 SBSQ HOSP IP/OBS MODERATE 35: CPT | Mod: GC | Performed by: INTERNAL MEDICINE

## 2023-08-21 PROCEDURE — 250N000012 HC RX MED GY IP 250 OP 636 PS 637: Performed by: INTERNAL MEDICINE

## 2023-08-21 PROCEDURE — 250N000013 HC RX MED GY IP 250 OP 250 PS 637: Performed by: STUDENT IN AN ORGANIZED HEALTH CARE EDUCATION/TRAINING PROGRAM

## 2023-08-21 PROCEDURE — 82248 BILIRUBIN DIRECT: CPT | Performed by: STUDENT IN AN ORGANIZED HEALTH CARE EDUCATION/TRAINING PROGRAM

## 2023-08-21 PROCEDURE — 97530 THERAPEUTIC ACTIVITIES: CPT | Mod: GP | Performed by: PHYSICAL THERAPIST

## 2023-08-21 PROCEDURE — 250N000011 HC RX IP 250 OP 636: Mod: JZ | Performed by: STUDENT IN AN ORGANIZED HEALTH CARE EDUCATION/TRAINING PROGRAM

## 2023-08-21 PROCEDURE — 250N000013 HC RX MED GY IP 250 OP 250 PS 637

## 2023-08-21 PROCEDURE — 258N000003 HC RX IP 258 OP 636: Performed by: INTERNAL MEDICINE

## 2023-08-21 PROCEDURE — 99231 SBSQ HOSP IP/OBS SF/LOW 25: CPT | Performed by: STUDENT IN AN ORGANIZED HEALTH CARE EDUCATION/TRAINING PROGRAM

## 2023-08-21 PROCEDURE — 250N000013 HC RX MED GY IP 250 OP 250 PS 637: Performed by: PHYSICIAN ASSISTANT

## 2023-08-21 PROCEDURE — 82310 ASSAY OF CALCIUM: CPT | Performed by: PEDIATRICS

## 2023-08-21 PROCEDURE — 250N000011 HC RX IP 250 OP 636: Mod: JZ

## 2023-08-21 RX ORDER — PSYLLIUM SEED (WITH DEXTROSE)
2 POWDER (GRAM) ORAL DAILY
Status: DISCONTINUED | OUTPATIENT
Start: 2023-08-21 | End: 2023-08-24 | Stop reason: HOSPADM

## 2023-08-21 RX ADMIN — ENOXAPARIN SODIUM 40 MG: 40 INJECTION SUBCUTANEOUS at 19:52

## 2023-08-21 RX ADMIN — METHOCARBAMOL 750 MG: 750 TABLET ORAL at 21:02

## 2023-08-21 RX ADMIN — PANTOPRAZOLE SODIUM 40 MG: 40 TABLET, DELAYED RELEASE ORAL at 09:33

## 2023-08-21 RX ADMIN — FENTANYL 1 PATCH: 50 PATCH TRANSDERMAL at 09:35

## 2023-08-21 RX ADMIN — SENNOSIDES AND DOCUSATE SODIUM 1 TABLET: 8.6; 5 TABLET ORAL at 09:33

## 2023-08-21 RX ADMIN — SODIUM CHLORIDE 45 MG: 0.9 INJECTION, SOLUTION INTRAVENOUS at 19:39

## 2023-08-21 RX ADMIN — OXYCODONE HYDROCHLORIDE 10 MG: 10 TABLET ORAL at 04:17

## 2023-08-21 RX ADMIN — OXYCODONE HYDROCHLORIDE 10 MG: 10 TABLET ORAL at 09:19

## 2023-08-21 RX ADMIN — Medication 5 ML: at 17:16

## 2023-08-21 RX ADMIN — Medication 25 MG: at 21:02

## 2023-08-21 RX ADMIN — METHOCARBAMOL 750 MG: 750 TABLET ORAL at 09:33

## 2023-08-21 RX ADMIN — Medication 2 WAFER: at 12:39

## 2023-08-21 RX ADMIN — DEXAMETHASONE 8 MG: 4 TABLET ORAL at 09:33

## 2023-08-21 RX ADMIN — OXYCODONE HYDROCHLORIDE 15 MG: 10 TABLET ORAL at 17:15

## 2023-08-21 ASSESSMENT — ACTIVITIES OF DAILY LIVING (ADL)
ADLS_ACUITY_SCORE: 39
ADLS_ACUITY_SCORE: 43
ADLS_ACUITY_SCORE: 39
ADLS_ACUITY_SCORE: 43

## 2023-08-21 NOTE — PROGRESS NOTES
"Care Management Follow Up    Length of Stay (days): 11    Expected Discharge Date: 08/28/2023     Concerns to be Addressed:  Care progression, pending chemo plan     Patient plan of care discussed at interdisciplinary rounds: Yes    Anticipated Discharge Disposition: TCU     Anticipated Discharge Services:  Therapy  Anticipated Discharge DME:  TBD    Patient/family educated on Medicare website which has current facility and service quality ratings: Yes   Education Provided on the Discharge Plan:  Yes  Patient/Family in Agreement with the Plan:  Yes    Referrals Placed by CM/SW:  Yes  Private pay costs discussed: Not applicable at this time    Additional Information:  Per chart reviewed, \"final chemo plan still TBD\". Followed up with  TCU for update on bed status. Reported is that pt's current chemo meds (cisplatin and doxorubicin) are cost effective for TCU. However,  TCU is wondering if pt will be placed on different chemo meds once the results of his biopsy are back. Thus, TCU admission is pending actual chemo plan and discharge chemo meds for TCU to evaluate the cost of chemo medication(s).  TCU is also wondering if pt can be switched from Neulasta to Neupogen or Nivestym while at TCU as the cost of Neupogen or Nivestym can be managed by TCU. CM will continue following to support discharge planning.     Yadira Valdez, [facility]  P: [phone]  Pager: [phone]  F: [phone]  Weekend Pager: [phone]  Weekend Coverage: 5A; 5B; 5C    "

## 2023-08-21 NOTE — PROGRESS NOTES
Solid Tumor Oncology Consult Service  Progress Note   Date of Service: 08/21/2023  Patient: Sarbjit Swain  MRN: [id number]  Admission Date: 8/10/2023  Hospital Day # 11  Cancer Diagnosis: Chondroblastic osteosarcoma  Primary Outpatient Oncologist: Kee Palma at [facility]  Current Treatment Plan: Chemotherapy started cisplatin and doxorubicin on 8/19    Assessment & Plan:   Sarbjit is a 43 year old male with a history of BRCA1 mutation, medulloblastoma at at age 4 s/p  shunt and whole spine radiation, rectal cancer dx July 201 s/p hemicolectomy and CAPOX partial therapy (discontinued due to intolerance) who presents with pathologic left femoral neck and acetabulum fracture with workup revealing 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 He is now s/p internal fixation of his left hip with orthopedic surgery    The patient began experiencing left hip pain around April/May 2023. MRI of left hip on 6/30 revealed infiltrative metastatic lesion in the left acetabulum extending into the superior pubic ramus with associated nondisplaced acetabular fracture. Patient later had a PET on 7/21 that showed an FDG avid mass involving the left pelvis and proximal femur. PET also revealed multiple bilateral FDG avid intrapulmonary and subpleural nodules, favoring metastasis over primary pulmonary malignancy. Additionally, there was nonspecific FDG uptake along the gallbladder fundus and equivocal punctate focus of uptake in the subscapular left hepatic lobe. The patient underwent biopsy on 7/31 at [facility] and the biopsy later resulted as high-grade chondroblastic osteosarcoma. Meanwhile, the patient was transferred to the orthopedic service at [facility] from [facility] on 8/9 with a pathological subcapital fracture of the left femur with acetabular involvement; he is now s/p internal fixation of the hip on 8/14.     He had a bronchoscopy done on 8/17 with biopsy pending. He proceeded  with chemotherapy for chondroblastic osteosarcoma.  (doxorubicin 75mg/m2 over 3 days and cisplatin 90 mg/m2 on day 1) and is now on day 3, tolerating well.        Summary & Recommendations:   - Brain MRI with contrast -- this demonstrates multiple meningiomas which are common after childhood CNS radiation; will need to compare with outside imaging to look at how significant these changes are.  - Will follow up on pathology from bronchoscopy  - We discussed that given patient has progressive disease involving the left hip, we will continue chemotherapy for his chondroblastic osteosarcoma.  Discussed continuing doxorubicin 75mg/m2 over 3 days and cisplatin 90 mg/m2 on day 1.   Now day #3 . 35 day cycles. Anticipate to discharge to [facility] then receiving next cycle of chemotherapy outpatient.  - Please check daily LFTs while on chemotherapy.  - Decrease stool softeners to once per day as patient is having diarrhea.  - We discussed following with us outpatient with Dr. Ellington vs following up with their [facility] oncologist (Kee Palma). They will let us know what they decide and we will arrange accordingly. He is due for next cycle on     With this chemo regimen, patients can have nausea.  Emend, decadron and aloxi are prophylactically ordered.  It is reasonable to consider prn compazine, lorazepam.    Bouchra Richmond MD  Hematology/Oncology Fellow  ___________________________________________________________________    Subjective & Interval History:      He had a rough night with ongoing hip pain. He is otherwise feeling okay, no nausea, vomiting.  Fiber adder to daily medications. He has very soft stools and is wondering if his bowel regimen can be adjusted.      Oncologic History:  History of medulloblastoma at age 4, treated with resection, whole brain radiation, and no chemotherapy  Subsequently diagnosed with rectal cancer in 2021 with hemicolectomy and CAPOX for 3 months, which was discontinued due to side effects (in  "discussion, he developed cardiac vasospasms after taking oral Xeloda, no infusion reaction)  Now with new pathologic left hip fracture with biopsy showing high-grade chondroblastic osteosarcoma (biopsy 7/31/23).       Physical Exam:    Blood pressure 123/83, pulse 106, temperature 98.1  F (36.7  C), temperature source Axillary, resp. rate 16, height 1.59 m (5' 2.6\"), weight 73.5 kg (162 lb), SpO2 96 %.    General: laying in bed, in NAD  HEENT: sclera anicteric, EOMI  CV: RRR, normal S1/S2, no m/r/g  Resp: CTAB, no wheezing/crackles, normal respiratory effort on ambient air  GI: soft, non-tender, non-distended, bowel sounds present and normoactive  MSK: warm and well-perfused, no lower extremity edema  Skin: no rashes on limited exam, no jaundice  Neuro: Alert and interactive, moves all extremities equally, no focal deficits    Labs & Studies: I personally reviewed the following studies:  ROUTINE LABS (Last four results):  CMP  Recent Labs   Lab 08/21/23  0428 08/20/23  0306 08/19/23  1325 08/19/23  0329 08/18/23  0648    138 135* 138 139   POTASSIUM 3.8 4.3 4.0 3.8 4.2   CHLORIDE 104 104 97* 98 100   CO2 24 25 27 28 29   ANIONGAP 10 9 11 12 10   * 148* 112* 102* 109*   BUN 21.6* 17.2 16.9 19.0 14.3   CR 0.78 0.65* 0.81 0.89 0.82   GFRESTIMATED >90 >90 >90 >90 >90   JESSICA 8.7 8.4* 9.3 9.2 9.3   MAG  --   --  2.1  --  2.3   PROTTOTAL 5.9*  --  6.5 6.2*  --    ALBUMIN 3.0*  --  3.1* 3.0*  --    BILITOTAL 0.3  --  0.5 0.4  --    ALKPHOS 301*  --  353* 321*  --    AST 50*  --  83* 94*  --    *  --  142* 141*  --      CBC  Recent Labs   Lab 08/21/23 0428 08/20/23  0306 08/19/23  1325 08/19/23  0329   WBC 11.8* 5.7 7.8 8.0  8.0   RBC 3.64* 3.70* 4.17* 3.83*   HGB 10.2* 10.4* 11.6* 10.9*   HCT 32.1* 32.5* 36.4* 33.1*   MCV 88 88 87 86   MCH 28.0 28.1 27.8 28.5   MCHC 31.8 32.0 31.9 32.9   RDW 13.4 13.7 13.7 13.6   * 362 407 370     INR  No lab results found in last 7 days.      Medications list " for reference:  Current Facility-Administered Medications   Medication    0.9% sodium chloride BOLUS    acetaminophen (TYLENOL) tablet 650 mg    albuterol (PROVENTIL HFA/VENTOLIN HFA) inhaler    albuterol (PROVENTIL) neb solution 2.5 mg    alteplase (CATHFLO ACTIVASE) injection 2 mg    bisacodyl (DULCOLAX) suppository 10 mg    calcium carbonate (TUMS) chewable tablet 500 mg    dexAMETHasone (DECADRON) tablet 8 mg    diphenhydrAMINE (BENADRYL) injection 50 mg    DOXOrubicin (ADRIAMYCIN) 45 mg in sodium chloride 0.9 % 1,072.5 mL infusion    enoxaparin ANTICOAGULANT (LOVENOX) injection 40 mg    EPINEPHrine PF (ADRENALIN) injection 0.3 mg    famotidine (PEPCID) injection 20 mg    famotidine (PEPCID) injection 20 mg    fentaNYL (DURAGESIC) 50 mcg/hr 72 hr patch 1 patch    And    fentaNYL (DURAGESIC) Patch in Place    heparin 100 unit/mL injection 5 mL    heparin lock flush 10 UNIT/ML injection 5-20 mL    heparin lock flush 10 UNIT/ML injection 5-20 mL    heparin lock flush 10 UNIT/ML injection 5-20 mL    hydrOXYzine (ATARAX) tablet 25 mg    Or    hydrOXYzine (ATARAX) tablet 50 mg    lidocaine (LMX4) cream    lidocaine 1 % 0.1-1 mL    LORazepam (ATIVAN) injection 0.5 mg    magnesium hydroxide (MILK OF MAGNESIA) suspension 30 mL    meperidine (DEMEROL) injection 25 mg    methocarbamol (ROBAXIN) tablet 750 mg    methylPREDNISolone sodium succinate (solu-MEDROL) injection 125 mg    naloxone (NARCAN) injection 0.2 mg    Or    naloxone (NARCAN) injection 0.4 mg    Or    naloxone (NARCAN) injection 0.2 mg    Or    naloxone (NARCAN) injection 0.4 mg    ondansetron (ZOFRAN ODT) ODT tab 4 mg    Or    ondansetron (ZOFRAN) injection 4 mg    oxyCODONE IR (ROXICODONE) tablet 10 mg    Or    oxyCODONE (ROXICODONE) tablet 15 mg    pantoprazole (PROTONIX) EC tablet 40 mg    polyethylene glycol (MIRALAX) Packet 17 g    prochlorperazine (COMPAZINE) injection 10 mg    Or    prochlorperazine (COMPAZINE) tablet 10 mg    psyllium (METAMUCIL)  wafer 2 Wafer    senna-docusate (SENOKOT-S/PERICOLACE) 8.6-50 MG per tablet 1 tablet    sodium chloride (PF) 0.9% PF flush 10-20 mL    sodium chloride (PF) 0.9% PF flush 10-40 mL    sodium chloride (PF) 0.9% PF flush 3 mL    sodium chloride (PF) 0.9% PF flush 3 mL    sodium chloride (PF) 0.9% PF flush 3-20 mL    traZODone (DESYREL) half-tab 25 mg

## 2023-08-21 NOTE — PROGRESS NOTES
[facility]    Medicine Progress Note - Hospitalist Service, GOLD TEAM 7    Date of Admission:  8/10/2023    Assessment & Plan   Sarbjit Swain is a 43M with hx of Greene's esophagus, BPH, medulloblastoma s/p  shunt, rectal cancer diagnosed 2021 s/p hemicolectomy and capecitabine partial therapy (discontinued due to an chest tightness reaction) and new osteosarcoma of the left ilium who was initially admitted on 8/10 after a pathologic left femoral neck and acetabulum fracture and underwent repair with ortho on 8/14. Now transferred to [place] on medicine team for ongoing management and initiation of chemotherapy which was started on 8/19.    Acute pathologic left femoral neck and acetabulum fracture  High grade Chondroblastic Osteosarcoma  Recently diagnosed osteosarcoma and PET CT concerning for metastatic disease to lungs. Pathologic fracture on 8/9, so transferred from [place] for closed reduction with percutaneous pinning of the L hip (8/14/23 w/ Dr. Avila). [facility]WB for transfers.   - Onc consulted, appreciate recs   - Started doxorubicin/cisplatin on 8/19  - PM&R consulted, appreciate recs   - Recommend TCU (after chemo)  - PICC line placed, port to be placed outpatient   - Fentanyl patch, oxycodone and Tylenol for pain  - Health Psychology consulted    Pulmonary lesions, suspected mets  Recent PET showed bilateral lesions suspected to be mets though with his cancer hx it is not entirely clear from which primary.   - S/p bronch w/ biopsy on 8/17, awaiting pathology results    Delirium ( Visual and Auditory Hallucinations)- resolved  Acute Encephalopathy -- resolved  Previously having visual and audio hallucinations this hospitalization. Also having baseline confusion. Imaging showed multiple dural-based masses with vasogenic edema that are unchanged from prior imaging. Most likely delirium.  - Delirium precuations  - Pain control  - Continue trazodone 25 mg at  "bedtime   - Continue PT/OT    Sinus tachycardia  Unclear etiology but has been stable overall.  - Continue routine vitals    Hyponatremia  Most likely SIADH vs malignancy related.  - Daily BMP    Acute on chronic cancer related pain  -Continue fentanyl patch, oxycodone PRN and Tylenol PRN     Hx of Multiple Cancers:         Stage II colorectal cancer s/p hemicolectomy now s/p re-anastomosis    Follows with Dr. Palma at [facility], last seen 8/7. Dx in 7/2021.  Underwent hemicolectomy 9/2021 followed by re-anastomosis in 12/2021.  ---   Follow up outpatient         Hx of medulloblastoma s/p  shunt   Diagnosed  At age 4. Underwent craniotomy with resection follow by radiation and chemotherapy.  Has stable right sided brain masses c/w meningiomas being followed by Neurosurgery.   --- will do a CT scan head today.        Hx of Multiple Dural based extraaxial masses ( most lilkely meningioma) and vasogenic edema        BRCA1 positive   Being followed by Oncology for hx of cancer.         Pulm lesions with concern for mets  As above       Chondroblastic Osteosarcoma  As above    Hx of hypothyroidism  Noted in chart. Per review, no abnormal TSH/T4 to fit with diagnosis. Not on pta medication         Diet: Snacks/Supplements Adult: Ensure Clear; Between Meals  Advance Diet as Tolerated: Regular Diet Adult    DVT Prophylaxis: Enoxaparin (Lovenox) SQ  Bran Catheter: Not present  Lines: PRESENT      PICC 08/17/23 Double Lumen Right Basilic Chemotherapy. PICC okay to use.-Site Assessment: WDL      Cardiac Monitoring: None  Code Status: Full Code      Clinically Significant Risk Factors              # Hypoalbuminemia: Lowest albumin = 3 g/dL at 8/19/2023  3:29 AM, will monitor as appropriate            # Overweight: Estimated body mass index is 29.07 kg/m  as calculated from the following:    Height as of this encounter: 1.59 m (5' 2.6\").    Weight as of this encounter: 73.5 kg (162 lb).             Disposition Plan    "   Expected Discharge Date: 08/28/2023        Discharge Comments: Will need TCU once chemo completed. Final chemo plan still TBD.          Tiffanie Mujica MD  Hospitalist Service, [facility] TEAM [address] [place] [address]  Securely message with [employer] (more info)  Text page via [employer] Paging/Directory   See signed in provider for up to date coverage information  ______________________________________________________________________    Interval History   No acute events. Reports some ongoing issues with pain control, though denies feeling like any adjustments need to be made to the current medication regimen. Denies noticing any side effects from the chemo so far and is grateful for that.     Physical Exam   Vital Signs: Temp: 97.5  F (36.4  C) Temp src: Axillary BP: (!) 133/92 Pulse: 93   Resp: 16 SpO2: 97 % O2 Device: None (Room air)    Weight: 162 lbs 0 oz    Constitutional: awake, alert, cooperative, no apparent distress  Respiratory: breathing non-labored on RA, CTAB, no crackles or wheezing  Cardiovascular: RRR  GI: soft, non-distended, non-tender  Skin: normal skin color, texture, turgor  Musculoskeletal: no lower extremity edema present  Neurologic: alert and oriented x3, moving all extremities equally  Neuropsychiatric: calm, normal eye contact, affect normal      Medical Decision Making       **CLEAR ALL SELECTIONS**      Data     I have personally reviewed the following data over the past 24 hrs:    11.8 (H)  \   10.2 (L)   / 463 (H)     138 104 21.6 (H) /  105 (H)   3.8 24 0.78 \

## 2023-08-21 NOTE — PROGRESS NOTES
Chemo drug: Doxorubicin  Tolerated: Well, no complaints  Intervention: Decadron daily.  Response: Tolerated well.  Plan: Doxorubicin tomorrow evening.    Medication infusing through purple lumen. At time of completion of previous infusion of doxorubicin patient found to have purple lumen occluded. Oncoming RN present at time of infusion change. Changed cap of purple lumen without any luck for instilling of saline. Tried multiple position changes, coughing/deep breathing without any changes. Red lumen present for blood return. Changed doxorubicin infusion to red lumen. Notified Charge RN & oncoming RN to notify hospitalist.

## 2023-08-21 NOTE — PLAN OF CARE
"BP (!) 133/92   Pulse 93   Temp 97.5  F (36.4  C) (Axillary)   Resp 16   Ht 1.59 m (5' 2.6\")   Wt 73.5 kg (162 lb)   SpO2 97%   BMI 29.07 kg/m      AVSS on RA. A&O x4. Up with the assist of 1 and gait belt/walker. Denies nausea. Had achy pain in left leg. Received robaxin x1 and oxy x2 with some relief. Good UOP and 1 bm this shift. Continue with plan of care.    Problem: Plan of Care - These are the overarching goals to be used throughout the patient stay.    Goal: Absence of Hospital-Acquired Illness or Injury  Outcome: Progressing  Intervention: Identify and Manage Fall Risk  Recent Flowsheet Documentation  Taken 8/21/2023 0420 by Luisito Brown RN  Safety Promotion/Fall Prevention:   activity supervised   assistive device/personal items within reach   chemotherapeutic precautions   clutter free environment maintained   nonskid shoes/slippers when out of bed   patient and family education   room organization consistent   safety round/check completed  Taken 8/20/2023 2310 by Luisito Brown RN  Safety Promotion/Fall Prevention:   activity supervised   assistive device/personal items within reach   chemotherapeutic precautions   clutter free environment maintained   nonskid shoes/slippers when out of bed   patient and family education   room organization consistent   safety round/check completed  Taken 8/20/2023 2000 by Luisito Brown RN  Safety Promotion/Fall Prevention:   activity supervised   assistive device/personal items within reach   chemotherapeutic precautions   clutter free environment maintained   nonskid shoes/slippers when out of bed   patient and family education   room organization consistent   safety round/check completed  Intervention: Prevent Skin Injury  Recent Flowsheet Documentation  Taken 8/20/2023 2000 by Luisito Brown RN  Body Position: position changed independently  Intervention: Prevent Infection  Recent Flowsheet Documentation  Taken 8/21/2023 0420 by Luisito Brown RN  Infection " Prevention:   single patient room provided   visitors restricted/screened   environmental surveillance performed   equipment surfaces disinfected   hand hygiene promoted   personal protective equipment utilized   rest/sleep promoted  Taken 8/20/2023 2310 by Luisito Brown, RN  Infection Prevention:   single patient room provided   visitors restricted/screened   environmental surveillance performed   equipment surfaces disinfected   hand hygiene promoted   personal protective equipment utilized   rest/sleep promoted  Taken 8/20/2023 2000 by Luisito Brown, RN  Infection Prevention:   single patient room provided   visitors restricted/screened   environmental surveillance performed   equipment surfaces disinfected   hand hygiene promoted   personal protective equipment utilized   rest/sleep promoted  Goal: Optimal Comfort and Wellbeing  Outcome: Progressing  Intervention: Provide Person-Centered Care  Recent Flowsheet Documentation  Taken 8/20/2023 2000 by Luisito Brown, RN  Trust Relationship/Rapport:   care explained   choices provided   emotional support provided   empathic listening provided   questions answered   questions encouraged   reassurance provided   thoughts/feelings acknowledged  Goal: Readiness for Transition of Care  Outcome: Progressing     Problem: Hip Fracture Medical Management  Goal: Optimal Coping with Change in Health Status  Outcome: Progressing  Goal: Baseline Cognitive Function Maintained  Outcome: Progressing  Goal: Absence of Embolism  Outcome: Progressing  Goal: Fracture Stability  Outcome: Progressing

## 2023-08-21 NOTE — PROVIDER NOTIFICATION
Provider notified via [employer] that the purple lumen did not have blood return at time of  doxorubicin bag change. Requested alteplase for the purple lumen. Blood return noted in red lumen. Chemo was changed to the red lumen.

## 2023-08-22 ENCOUNTER — APPOINTMENT (OUTPATIENT)
Dept: PHYSICAL THERAPY | Facility: CLINIC | Age: 43
End: 2023-08-22
Attending: STUDENT IN AN ORGANIZED HEALTH CARE EDUCATION/TRAINING PROGRAM
Payer: COMMERCIAL

## 2023-08-22 LAB
ALBUMIN SERPL BCG-MCNC: 3 G/DL (ref 3.5–5.2)
ALP SERPL-CCNC: 310 U/L (ref 40–129)
ALT SERPL W P-5'-P-CCNC: 118 U/L (ref 0–70)
ANION GAP SERPL CALCULATED.3IONS-SCNC: 10 MMOL/L (ref 7–15)
AST SERPL W P-5'-P-CCNC: 44 U/L (ref 0–45)
ATRIAL RATE - MUSE: 116 BPM
BILIRUB DIRECT SERPL-MCNC: <0.2 MG/DL (ref 0–0.3)
BILIRUB SERPL-MCNC: 0.4 MG/DL
BUN SERPL-MCNC: 19 MG/DL (ref 6–20)
CALCIUM SERPL-MCNC: 9 MG/DL (ref 8.6–10)
CHLORIDE SERPL-SCNC: 101 MMOL/L (ref 98–107)
CREAT SERPL-MCNC: 0.84 MG/DL (ref 0.67–1.17)
DEPRECATED HCO3 PLAS-SCNC: 25 MMOL/L (ref 22–29)
DIASTOLIC BLOOD PRESSURE - MUSE: NORMAL MMHG
ERYTHROCYTE [DISTWIDTH] IN BLOOD BY AUTOMATED COUNT: 13.5 % (ref 10–15)
GFR SERPL CREATININE-BSD FRML MDRD: >90 ML/MIN/1.73M2
GLUCOSE SERPL-MCNC: 89 MG/DL (ref 70–99)
HCT VFR BLD AUTO: 32.7 % (ref 40–53)
HGB BLD-MCNC: 10.7 G/DL (ref 13.3–17.7)
INTERPRETATION ECG - MUSE: NORMAL
MCH RBC QN AUTO: 28.6 PG (ref 26.5–33)
MCHC RBC AUTO-ENTMCNC: 32.7 G/DL (ref 31.5–36.5)
MCV RBC AUTO: 87 FL (ref 78–100)
P AXIS - MUSE: 55 DEGREES
PLATELET # BLD AUTO: 426 10E3/UL (ref 150–450)
POTASSIUM SERPL-SCNC: 4 MMOL/L (ref 3.4–5.3)
PR INTERVAL - MUSE: 126 MS
PROT SERPL-MCNC: 6.1 G/DL (ref 6.4–8.3)
QRS DURATION - MUSE: 80 MS
QT - MUSE: 300 MS
QTC - MUSE: 417 MS
R AXIS - MUSE: 59 DEGREES
RBC # BLD AUTO: 3.74 10E6/UL (ref 4.4–5.9)
SODIUM SERPL-SCNC: 136 MMOL/L (ref 136–145)
SYSTOLIC BLOOD PRESSURE - MUSE: NORMAL MMHG
T AXIS - MUSE: 22 DEGREES
VENTRICULAR RATE- MUSE: 116 BPM
WBC # BLD AUTO: 8.7 10E3/UL (ref 4–11)

## 2023-08-22 PROCEDURE — 250N000013 HC RX MED GY IP 250 OP 250 PS 637

## 2023-08-22 PROCEDURE — 250N000013 HC RX MED GY IP 250 OP 250 PS 637: Performed by: PHYSICIAN ASSISTANT

## 2023-08-22 PROCEDURE — 250N000011 HC RX IP 250 OP 636: Mod: JZ

## 2023-08-22 PROCEDURE — 97530 THERAPEUTIC ACTIVITIES: CPT | Mod: GP

## 2023-08-22 PROCEDURE — 99232 SBSQ HOSP IP/OBS MODERATE 35: CPT | Mod: GC | Performed by: INTERNAL MEDICINE

## 2023-08-22 PROCEDURE — 82435 ASSAY OF BLOOD CHLORIDE: CPT | Performed by: PEDIATRICS

## 2023-08-22 PROCEDURE — 250N000012 HC RX MED GY IP 250 OP 636 PS 637: Performed by: INTERNAL MEDICINE

## 2023-08-22 PROCEDURE — 82248 BILIRUBIN DIRECT: CPT | Performed by: STUDENT IN AN ORGANIZED HEALTH CARE EDUCATION/TRAINING PROGRAM

## 2023-08-22 PROCEDURE — 99232 SBSQ HOSP IP/OBS MODERATE 35: CPT | Performed by: INTERNAL MEDICINE

## 2023-08-22 PROCEDURE — 85027 COMPLETE CBC AUTOMATED: CPT | Performed by: STUDENT IN AN ORGANIZED HEALTH CARE EDUCATION/TRAINING PROGRAM

## 2023-08-22 PROCEDURE — 250N000011 HC RX IP 250 OP 636: Mod: JZ | Performed by: STUDENT IN AN ORGANIZED HEALTH CARE EDUCATION/TRAINING PROGRAM

## 2023-08-22 PROCEDURE — 250N000013 HC RX MED GY IP 250 OP 250 PS 637: Performed by: STUDENT IN AN ORGANIZED HEALTH CARE EDUCATION/TRAINING PROGRAM

## 2023-08-22 PROCEDURE — 120N000005 HC R&B MS OVERFLOW UMMC

## 2023-08-22 RX ORDER — POLYETHYLENE GLYCOL 3350 17 G/17G
17 POWDER, FOR SOLUTION ORAL DAILY PRN
Status: DISCONTINUED | OUTPATIENT
Start: 2023-08-22 | End: 2023-08-24 | Stop reason: HOSPADM

## 2023-08-22 RX ORDER — SENNOSIDES 8.6 MG
1 TABLET ORAL 2 TIMES DAILY PRN
Status: DISCONTINUED | OUTPATIENT
Start: 2023-08-22 | End: 2023-08-24 | Stop reason: HOSPADM

## 2023-08-22 RX ADMIN — OXYCODONE HYDROCHLORIDE 15 MG: 10 TABLET ORAL at 17:05

## 2023-08-22 RX ADMIN — DEXAMETHASONE 8 MG: 4 TABLET ORAL at 08:04

## 2023-08-22 RX ADMIN — SENNOSIDES AND DOCUSATE SODIUM 1 TABLET: 8.6; 5 TABLET ORAL at 08:04

## 2023-08-22 RX ADMIN — HYDROXYZINE HYDROCHLORIDE 25 MG: 25 TABLET, FILM COATED ORAL at 23:16

## 2023-08-22 RX ADMIN — PANTOPRAZOLE SODIUM 40 MG: 40 TABLET, DELAYED RELEASE ORAL at 08:04

## 2023-08-22 RX ADMIN — METHOCARBAMOL 750 MG: 750 TABLET ORAL at 19:54

## 2023-08-22 RX ADMIN — Medication 5 ML: at 20:38

## 2023-08-22 RX ADMIN — ENOXAPARIN SODIUM 40 MG: 40 INJECTION SUBCUTANEOUS at 19:54

## 2023-08-22 RX ADMIN — Medication 25 MG: at 21:32

## 2023-08-22 RX ADMIN — Medication 2 WAFER: at 08:05

## 2023-08-22 RX ADMIN — Medication 5 ML: at 17:05

## 2023-08-22 RX ADMIN — OXYCODONE HYDROCHLORIDE 15 MG: 10 TABLET ORAL at 04:35

## 2023-08-22 RX ADMIN — HYDROXYZINE HYDROCHLORIDE 25 MG: 25 TABLET, FILM COATED ORAL at 17:05

## 2023-08-22 RX ADMIN — METHOCARBAMOL 750 MG: 750 TABLET ORAL at 08:09

## 2023-08-22 RX ADMIN — OXYCODONE HYDROCHLORIDE 15 MG: 10 TABLET ORAL at 23:16

## 2023-08-22 ASSESSMENT — ACTIVITIES OF DAILY LIVING (ADL)
ADLS_ACUITY_SCORE: 39

## 2023-08-22 NOTE — PROGRESS NOTES
Stop time on MAR & chart I & O  Chemo drug: Doxorubicin   Tolerated: Well  Intervention: Blood return Q4 hours  Response: Blood return noted  Plan: Continue to monitor

## 2023-08-22 NOTE — PLAN OF CARE
"BP (!) 146/89 (BP Location: Left arm)   Pulse 96   Temp 97.6  F (36.4  C) (Axillary)   Resp 18   Ht 1.59 m (5' 2.6\")   Wt 73.5 kg (162 lb)   SpO2 94%   BMI 29.07 kg/m      Hypertensive, OVSS on RA. Denies nausea.  Pain controlled with oxy x1 and robaxin x1.  Continuous chemo infusing. Voiding well, no BM overnight. No replacements needed. Continue plan of care.     Problem: Hip Fracture Medical Management  Goal: Optimal Coping with Change in Health Status  Outcome: Progressing   Goal Outcome Evaluation:                        "

## 2023-08-22 NOTE — CONSULTS
Health Psychology                                                                                                                          Vanessa Lyles, Ph.D., L.P [phone]  Emerald Wynn, Ph.D., L.P. [phone]  Sandy Sears, Ph.D, L..P. [phone]  Cindy Khan, Ph.D., L.P. [phone]  Navneet Green, Ph.D., A.B.P.P., L.P. [phone]         Kennedi Patterson, Ph.D., L.P. [phone]       Xochilt Arora, Ph.D., A.B.P.P., LP [phone]           [facility], 3rd Floor  [address] [place] [address]       Inpatient Health Psychology Consultation      Date of Service:  08/22/23    Met with Sarbjit tanner to introduce Health Psychology services and discuss nature of referral. He was agreeable to meeting. We Reviewed experiences with current admission and nature of emotions surfacing; sadness, grief, fear. He worries about his ability to walk and plans moving forward. He exhibited some discomfort/emberassment with expressing emotions in the visit. Validated his emotional experiences and discussed an overview of ways to deal with emotions. Currently he talks to his wife/mother. Discussed that there can be additional ways to cope with symptom progression. He was agreeable with this idea.    PLAN: Follow-up with patient later this week.     Sandy Sears, PhD,   Clinical Health Psychologist  Phone: [phone]  Pager: [phone]

## 2023-08-22 NOTE — PROGRESS NOTES
Care Management Follow Up    Length of Stay (days): 12    Expected Discharge Date: 08/28/2023     Concerns to be Addressed:  Placement     Patient plan of care discussed at interdisciplinary rounds: Yes    Anticipated Discharge Disposition:  TCU     Anticipated Discharge Services:  Therapy  Anticipated Discharge DME:  None    Patient/family educated on Medicare website which has current facility and service quality ratings:  Yes  Education Provided on the Discharge Plan:  Yes  Patient/Family in Agreement with the Plan:  Yes    Referrals Placed by CM/SW:  Yes  Private pay costs discussed: Not applicable at this time    Additional Information:  Per the oncology team, pt completed his chemo for this admission and next chemo course is planned for 9/22 as outpatient.     Followed up with  TCU - No bed available today and also waiting on insurance authorization. CM will continue following to support discharge planning     Pending:  [place] [id number], [place] [address]  Phone: [phone]    [facility]  P: [phone]  Pager: [phone]  F: [phone]  Weekend Pager: [phone]  Weekend Coverage: 5A; 5B; 5C

## 2023-08-22 NOTE — PLAN OF CARE
"Pt up in shower chair and showered. Pt up in chair and worked with therapy. Pt ate taost for breakfast and 1.5 davannis hoagie for lunch. Pt c/o pain and robaxin was given this am and nothing else yet for pain.  Problem: Plan of Care - These are the overarching goals to be used throughout the patient stay.    Goal: Plan of Care Review  Description: The Plan of Care Review/Shift note should be completed every shift.  The Outcome Evaluation is a brief statement about your assessment that the patient is improving, declining, or no change.  This information will be displayed automatically on your shift note.  Outcome: Progressing  Flowsheets (Taken 8/22/2023 1645)  Plan of Care Reviewed With: patient  Overall Patient Progress: improving  Goal: Patient-Specific Goal (Individualized)  Description: You can add care plan individualizations to a care plan. Examples of Individualization might be:  \"Parent requests to be called daily at 9am for status\", \"I have a hard time hearing out of my right ear\", or \"Do not touch me to wake me up as it startles me\".  Outcome: Progressing  Goal: Absence of Hospital-Acquired Illness or Injury  Outcome: Progressing  Intervention: Identify and Manage Fall Risk  Recent Flowsheet Documentation  Taken 8/22/2023 0800 by Samantha Saldana RN  Safety Promotion/Fall Prevention:   activity supervised   assistive device/personal items within reach   clutter free environment maintained   nonskid shoes/slippers when out of bed   patient and family education   safety round/check completed  Intervention: Prevent Skin Injury  Recent Flowsheet Documentation  Taken 8/22/2023 0800 by Samantha Saldana, RN  Body Position: position changed independently  Intervention: Prevent Infection  Recent Flowsheet Documentation  Taken 8/22/2023 0800 by Samantha Saldana RN  Infection Prevention: rest/sleep promoted  Goal: Optimal Comfort and Wellbeing  Outcome: Progressing  Intervention: Monitor Pain and Promote " Comfort  Recent Flowsheet Documentation  Taken 8/22/2023 0822 by Samantha Saldana RN  Pain Management Interventions: medication (see MAR)  Intervention: Provide Person-Centered Care  Recent Flowsheet Documentation  Taken 8/22/2023 0800 by Samantha Saldana RN  Trust Relationship/Rapport:   care explained   choices provided   questions encouraged   reassurance provided   thoughts/feelings acknowledged  Goal: Readiness for Transition of Care  Outcome: Progressing     Problem: Hip Fracture Medical Management  Goal: Optimal Coping with Change in Health Status  Outcome: Progressing  Goal: Absence of Bleeding  Outcome: Progressing  Goal: Effective Bowel Elimination  Outcome: Progressing  Goal: Baseline Cognitive Function Maintained  Outcome: Progressing  Goal: Absence of Embolism  Outcome: Progressing  Goal: Fracture Stability  Outcome: Progressing  Goal: Optimal Functional Performance  Outcome: Progressing  Intervention: Promote Optimal Functional Status  Recent Flowsheet Documentation  Taken 8/22/2023 0800 by Samantha Saldana RN  Activity Management: activity adjusted per tolerance  Goal: Optimal Pain Control and Function  Outcome: Progressing  Intervention: Manage Acute Orthopaedic-Related Pain  Recent Flowsheet Documentation  Taken 8/22/2023 0822 by Samantha Saldana RN  Pain Management Interventions: medication (see MAR)  Goal: Effective Urinary Elimination  Outcome: Progressing     Problem: Oral Intake Inadequate  Goal: Improved Oral Intake  Outcome: Progressing   Goal Outcome Evaluation:      Plan of Care Reviewed With: patient    Overall Patient Progress: improvingOverall Patient Progress: improving

## 2023-08-22 NOTE — PLAN OF CARE
"/89 (BP Location: Left arm)   Pulse 93   Temp 98.2  F (36.8  C) (Axillary)   Resp 16   Ht 1.59 m (5' 2.6\")   Wt 73.5 kg (162 lb)   SpO2 96%   BMI 29.07 kg/m      Afebrile; VSS on RA. L hip pain controlled with PRN Oxycodone x2 and Robaxin x1. No complaints of nausea. Tolerating continuous chemo without issues. Eating and drinking with encouragement. Adequate UOP. Worked with PT this afternoon. Continue POC.     Goal Outcome Evaluation:    Plan of Care Reviewed With: patient, spouse  Overall Patient Progress: improvingOverall Patient Progress: improving    Problem: Plan of Care - These are the overarching goals to be used throughout the patient stay.    Goal: Plan of Care Review  Description: The Plan of Care Review/Shift note should be completed every shift.  The Outcome Evaluation is a brief statement about your assessment that the patient is improving, declining, or no change.  This information will be displayed automatically on your shift note.  Outcome: Progressing  Flowsheets (Taken 8/21/2023 2000)  Plan of Care Reviewed With:   patient   spouse  Overall Patient Progress: improving  Goal: Patient-Specific Goal (Individualized)  Description: You can add care plan individualizations to a care plan. Examples of Individualization might be:  \"Parent requests to be called daily at 9am for status\", \"I have a hard time hearing out of my right ear\", or \"Do not touch me to wake me up as it startles me\".  Outcome: Progressing  Goal: Absence of Hospital-Acquired Illness or Injury  Outcome: Progressing  Intervention: Identify and Manage Fall Risk  Recent Flowsheet Documentation  Taken 8/21/2023 1600 by Samina Sanchez, RN  Safety Promotion/Fall Prevention:   activity supervised   clutter free environment maintained   nonskid shoes/slippers when out of bed   patient and family education   room near nurse's station   room organization consistent   safety round/check completed  Taken 8/21/2023 1200 by Daniel, " LIVIER Haas  Safety Promotion/Fall Prevention:   activity supervised   clutter free environment maintained   nonskid shoes/slippers when out of bed   patient and family education   room near nurse's station   room organization consistent   safety round/check completed  Taken 8/21/2023 0800 by Samina Sanchez RN  Safety Promotion/Fall Prevention:   activity supervised   clutter free environment maintained   nonskid shoes/slippers when out of bed   patient and family education   room near nurse's station   room organization consistent   safety round/check completed  Intervention: Prevent Skin Injury  Recent Flowsheet Documentation  Taken 8/21/2023 0800 by Samina Sanchez RN  Body Position: position changed independently  Intervention: Prevent Infection  Recent Flowsheet Documentation  Taken 8/21/2023 1600 by Samina Sanchez RN  Infection Prevention:   cohorting utilized   environmental surveillance performed   equipment surfaces disinfected   hand hygiene promoted   personal protective equipment utilized   rest/sleep promoted   single patient room provided   visitors restricted/screened  Taken 8/21/2023 1200 by Samina Sacnhez RN  Infection Prevention:   cohorting utilized   environmental surveillance performed   equipment surfaces disinfected   hand hygiene promoted   personal protective equipment utilized   rest/sleep promoted   single patient room provided   visitors restricted/screened  Taken 8/21/2023 0800 by Samina Sanchez RN  Infection Prevention:   cohorting utilized   environmental surveillance performed   equipment surfaces disinfected   hand hygiene promoted   personal protective equipment utilized   rest/sleep promoted   single patient room provided   visitors restricted/screened  Goal: Optimal Comfort and Wellbeing  Outcome: Progressing  Intervention: Monitor Pain and Promote Comfort  Recent Flowsheet Documentation  Taken 8/21/2023 1715 by Samina Sanchez RN  Pain Management Interventions:    medication (see MAR)   repositioned  Intervention: Provide Person-Centered Care  Recent Flowsheet Documentation  Taken 8/21/2023 0800 by Samina Sanchez RN  Trust Relationship/Rapport:   care explained   choices provided   questions encouraged   reassurance provided   thoughts/feelings acknowledged  Goal: Readiness for Transition of Care  Outcome: Progressing     Problem: Hip Fracture Medical Management  Goal: Optimal Coping with Change in Health Status  Outcome: Progressing  Goal: Absence of Bleeding  Outcome: Progressing  Goal: Effective Bowel Elimination  Outcome: Progressing  Goal: Baseline Cognitive Function Maintained  Outcome: Progressing  Goal: Absence of Embolism  Outcome: Progressing  Goal: Fracture Stability  Outcome: Progressing  Goal: Optimal Functional Performance  Outcome: Progressing  Intervention: Promote Optimal Functional Status  Recent Flowsheet Documentation  Taken 8/21/2023 0800 by Samina Sanchez RN  Activity Management: activity adjusted per tolerance  Goal: Optimal Pain Control and Function  Outcome: Progressing  Intervention: Manage Acute Orthopaedic-Related Pain  Recent Flowsheet Documentation  Taken 8/21/2023 1715 by Samina Sanchez, RN  Pain Management Interventions:   medication (see MAR)   repositioned  Goal: Effective Urinary Elimination  Outcome: Progressing  Intervention: Support Effective Urinary Elimination  Recent Flowsheet Documentation  Taken 8/21/2023 0800 by Samina Sanchez RN  Urinary Elimination Promotion: toileting device within reach     Problem: Oral Intake Inadequate  Goal: Improved Oral Intake  Outcome: Progressing

## 2023-08-22 NOTE — PROGRESS NOTES
[facility]    Medicine Progress Note - Hospitalist Service, GOLD TEAM 7    Date of Admission:  8/10/2023    Assessment & Plan   Sarbjit Swain is a 43M with hx of BRAC1 mutation, Greene's esophagus, BPH, medulloblastoma s/p  shunt, rectal cancer diagnosed 2021 s/p hemicolectomy and capecitabine partial therapy (discontinued due to an chest tightness reaction) and new osteosarcoma of the left ilium who was initially admitted on 8/10 after a pathologic left femoral neck and acetabulum fracture and underwent repair with ortho on 8/14. Transferred to [place] 8/16 on medicine team for ongoing management and initiation of chemotherapy which was started on 8/19, overall tolerated well.    Today:   -As pain not well controlled, discussed several options for improved pain control. Patient would like to attempt taking oxycodone 15mg more consistently to see if this help him have improved control (only taking sparingly 1-2 x per day at present and often taking 10mg dose).   -Will discuss dispo plan with oncology tomorrow, plan for TCU      Acute pathologic left femoral neck and acetabulum fracture  High grade Chondroblastic Osteosarcoma  Recently diagnosed osteosarcoma and PET CT concerning for metastatic disease to lungs. Pathologic fracture on 8/9, so transferred from [place] for closed reduction with percutaneous pinning of the L hip (8/14/23 w/ Dr. Avila). TTWB for transfers.   - Onc consulted, appreciate recs              - Started doxorubicin/cisplatin on 8/19  - PM&R consulted, appreciate recs              - Recommend TCU (after chemo)  - PICC line placed, port to be placed outpatient   - Fentanyl patch, oxycodone and Tylenol for pain  - Health Psychology consulted     Pulmonary lesions, suspected mets, confirmed to be metastatic osteosarcoma   Recent PET showed bilateral lesions suspected to be mets though with his cancer hx it is not entirely clear from which  primary.   - S/p bronch w/ biopsy on 8/17 - results with metastatic osteosarcoma      Delirium ( Visual and Auditory Hallucinations)- resolved  Acute Encephalopathy -- resolved  Previously having visual and audio hallucinations this hospitalization. Also having baseline confusion. Imaging showed multiple dural-based masses with vasogenic edema that are unchanged from prior imaging, meningiomas per oncology note. Most likely delirium.  - Delirium precuations  - Pain control  - Continue trazodone 25 mg at bedtime   - Continue PT/OT     Sinus tachycardia  Unclear etiology but has been stable overall.  - Continue routine vitals     Hyponatremia-  improved  Most likely SIADH vs malignancy related.  - Daily BMP     Acute on chronic cancer related pain  -Continue fentanyl patch, oxycodone PRN and Tylenol PRN     Hx of Multiple Cancers:         Stage II colorectal cancer s/p hemicolectomy now s/p re-anastomosis    Follows with Dr. Palma at [facility], last seen 8/7. Dx in 7/2021.  Underwent hemicolectomy 9/2021 followed by re-anastomosis in 12/2021.  ---   Follow up outpatient         Hx of medulloblastoma s/p  shunt   Diagnosed  At age 4. Underwent craniotomy with resection follow by radiation and chemotherapy.  Has stable right sided brain masses c/w meningiomas being followed by Neurosurgery.   --- will do a CT scan head today.        Hx of Multiple Dural based extraaxial masses ( most lilkely meningioma) and vasogenic edema        BRCA1 positive   Being followed by Oncology for hx of cancer.         Pulm lesions with concern for mets  As above       Chondroblastic Osteosarcoma  As above     Hx of hypothyroidism  Noted in chart. Per review, no abnormal TSH/T4 to fit with diagnosis. Not on pta medication         Diet: Snacks/Supplements Adult: Ensure Clear; Between Meals  Advance Diet as Tolerated: Regular Diet Adult    DVT Prophylaxis: Enoxaparin (Lovenox) SQ  Bran Catheter: Not present  Lines: PRESENT     "  PICC 08/17/23 Double Lumen Right Basilic Chemotherapy. PICC okay to use.-Site Assessment: WDL      Cardiac Monitoring: None  Code Status: Full Code      Clinically Significant Risk Factors              # Hypoalbuminemia: Lowest albumin = 3 g/dL at 8/22/2023  4:44 AM, will monitor as appropriate            # Obesity: Estimated body mass index is 30.43 kg/m  as calculated from the following:    Height as of this encounter: 1.59 m (5' 2.6\").    Weight as of this encounter: 76.9 kg (169 lb 9.6 oz).             Disposition Plan      Expected Discharge Date: 08/28/2023        Discharge Comments: Will need TCU once chemo completed. Final chemo plan still TBD.          Doretha Anaya DO  Hospitalist Service, GOLD TEAM [address] [place] [address]  Securely message with [email or web address] (more info)  Text page via [employer] Paging/Directory   See signed in provider for up to date coverage information  ______________________________________________________________________    Interval History   No overnight events reported. Patient able to eat and do transfers with PT. Overall tolerated chemo well. Reports pain is \"off the charts\" but minimal relief with oxy, but has not been taking as much due to concern about having hallucinations when he took higher doses in the past, but his wife recalls he was getting IV dilaudid at that time as well.     Physical Exam   Vital Signs: Temp: 97.9  F (36.6  C) Temp src: Axillary BP: 118/79 Pulse: 109   Resp: 18 SpO2: 97 % O2 Device: None (Room air)    Weight: 169 lbs 9.6 oz    Constitutional: no apparent distress, pleasant and cooperative   Cardiovascular: regular rate and rhythm, normal S1 and S2,   Respiratory: clear to auscultation bilaterally, no wheezing, rales, or rhonchi, normal work of breathing   GI: abdomen soft, non tender, non distended, bowel sounds present   Neuro: alert and oriented, no gross focal deficits noted      Medical Decision Making       Medical " complexity over the past 24 hours:  - Prescription DRUG MANAGEMENT performed      Data     I have personally reviewed the following data over the past 24 hrs:    8.7  \   10.7 (L)   / 426     136 101 19.0 /  89   4.0 25 0.84 \     ALT: 118 (H) AST: 44 AP: 310 (H) TBILI: 0.4   ALB: 3.0 (L) TOT PROTEIN: 6.1 (L) LIPASE: N/A       Imaging results reviewed over the past 24 hrs:   No results found for this or any previous visit (from the past 24 hour(s)).

## 2023-08-22 NOTE — PROGRESS NOTES
Solid Tumor Oncology Consult Service  Progress Note   Date of Service: 08/22/2023  Patient: Sarbjit Swain  MRN: [id number]  Admission Date: 8/10/2023  Hospital Day # 12  Cancer Diagnosis: Chondroblastic osteosarcoma  Primary Outpatient Oncologist: Kee Palma at [facility]  Current Treatment Plan: Chemotherapy started cisplatin and doxorubicin on 8/19    Assessment & Plan:   Sarbjit is a 43 year old male with a history of BRCA1 mutation, medulloblastoma at at age 4 s/p  shunt and whole spine radiation, rectal cancer dx July 201 s/p hemicolectomy and CAPOX partial therapy (discontinued due to intolerance) who presents with pathologic left femoral neck and acetabulum fracture with workup revealing 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 He is now s/p internal fixation of his left hip with orthopedic surgery    The patient began experiencing left hip pain around April/May 2023. MRI of left hip on 6/30 revealed infiltrative metastatic lesion in the left acetabulum extending into the superior pubic ramus with associated nondisplaced acetabular fracture. Patient later had a PET on 7/21 that showed an FDG avid mass involving the left pelvis and proximal femur. PET also revealed multiple bilateral FDG avid intrapulmonary and subpleural nodules, favoring metastasis over primary pulmonary malignancy. Additionally, there was nonspecific FDG uptake along the gallbladder fundus and equivocal punctate focus of uptake in the subscapular left hepatic lobe. The patient underwent biopsy on 7/31 at [facility] and the biopsy later resulted as high-grade chondroblastic osteosarcoma. Meanwhile, the patient was transferred to the orthopedic service at [facility] from [facility] on 8/9 with a pathological subcapital fracture of the left femur with acetabular involvement; he is now s/p internal fixation of the hip on 8/14.     He had a bronchoscopy done on 8/17 with biopsy that shows metastatic  osteosarcoma.He proceeded with chemotherapy for chondroblastic osteosarcoma on 8/19 (doxorubicin 75mg/m2 over 3 days and cisplatin 90 mg/m2 on day 1) and tolerated it well.  He is due for Neulasta tomorrow.  Planning to discharge to TCU and have second cycle of chemotherapy on 9/22/2023.  Second cycle can be done outpatient.      Summary & Recommendations:   - Brain MRI with contrast -- this demonstrates multiple meningiomas which are common after childhood CNS radiation; will need to compare with outside imaging to look at how significant these changes are.  - pathology from bronchoscopy shows osteosarcoma as expected  - We discussed that given patient has progressive disease involving the left hip, we will continue chemotherapy for his chondroblastic osteosarcoma.  Discussed continuing doxorubicin 75mg/m2 over 3 days and cisplatin 90 mg/m2 on day 1.   Completed. Neulasta due tomorrow . 35 day cycles. Anticipate to discharge to TCU then receiving next cycle of chemotherapy outpatient, due 9/22/23.   - We discussed following with us outpatient with Dr. Ellington vs following up with their [facility] oncologist (Kee Palma). He has decided to continue care with Dr. Palma. We will reach out to him.    With this chemo regimen, patients can have nausea.  Emend, decadron and aloxi are prophylactically ordered.  It is reasonable to consider prn compazine, lorazepam.    Bouchra Richmond MD  Hematology/Oncology Fellow  ___________________________________________________________________    Subjective & Interval History:      NAEO. He is feeling worn out. Diarrhea improved.   He has decided to follow up with his primary home oncologist once discharged.      Oncologic History:  History of medulloblastoma at age 4, treated with resection, whole brain radiation, and no chemotherapy  Subsequently diagnosed with rectal cancer in 2021 with hemicolectomy and CAPOX for 3 months, which was discontinued due to side effects (in discussion,  "he developed cardiac vasospasms after taking oral Xeloda, no infusion reaction)  Now with new pathologic left hip fracture with biopsy showing high-grade chondroblastic osteosarcoma (biopsy 7/31/23).       Physical Exam:    Blood pressure 118/79, pulse 109, temperature 97.9  F (36.6  C), temperature source Axillary, resp. rate 18, height 1.59 m (5' 2.6\"), weight 76.9 kg (169 lb 9.6 oz), SpO2 97 %.    General: laying in bed, in NAD  HEENT: sclera anicteric, EOMI  CV: RRR, normal S1/S2, no m/r/g  Resp: CTAB, no wheezing/crackles, normal respiratory effort on ambient air  GI: soft, non-tender, non-distended, bowel sounds present and normoactive  MSK: warm and well-perfused, no lower extremity edema  Skin: no rashes on limited exam, no jaundice  Neuro: Alert and interactive, moves all extremities equally, no focal deficits    Labs & Studies: I personally reviewed the following studies:  ROUTINE LABS (Last four results):  CMP  Recent Labs   Lab 08/22/23  0444 08/21/23  0428 08/20/23  0306 08/19/23  1325 08/19/23  0329 08/18/23  0648    138 138 135* 138 139   POTASSIUM 4.0 3.8 4.3 4.0 3.8 4.2   CHLORIDE 101 104 104 97* 98 100   CO2 25 24 25 27 28 29   ANIONGAP 10 10 9 11 12 10   GLC 89 105* 148* 112* 102* 109*   BUN 19.0 21.6* 17.2 16.9 19.0 14.3   CR 0.84 0.78 0.65* 0.81 0.89 0.82   GFRESTIMATED >90 >90 >90 >90 >90 >90   JESSICA 9.0 8.7 8.4* 9.3 9.2 9.3   MAG  --   --   --  2.1  --  2.3   PROTTOTAL 6.1* 5.9*  --  6.5 6.2*  --    ALBUMIN 3.0* 3.0*  --  3.1* 3.0*  --    BILITOTAL 0.4 0.3  --  0.5 0.4  --    ALKPHOS 310* 301*  --  353* 321*  --    AST 44 50*  --  83* 94*  --    * 137*  --  142* 141*  --      CBC  Recent Labs   Lab 08/22/23  0444 08/21/23  0428 08/20/23  0306 08/19/23  1325   WBC 8.7 11.8* 5.7 7.8   RBC 3.74* 3.64* 3.70* 4.17*   HGB 10.7* 10.2* 10.4* 11.6*   HCT 32.7* 32.1* 32.5* 36.4*   MCV 87 88 88 87   MCH 28.6 28.0 28.1 27.8   MCHC 32.7 31.8 32.0 31.9   RDW 13.5 13.4 13.7 13.7    463* 362 " 407     INR  No lab results found in last 7 days.      Pathology    8/17/23       Component Ref Range & Units    Final Diagnosis   Specimen A                 Interpretation:                  Positive for malignancy  Consistent with metastatic osteosarcoma (see comment)                  Adequacy:                 Satisfactory for evaluation          Medications list for reference:  Current Facility-Administered Medications   Medication    0.9% sodium chloride BOLUS    acetaminophen (TYLENOL) tablet 650 mg    albuterol (PROVENTIL HFA/VENTOLIN HFA) inhaler    albuterol (PROVENTIL) neb solution 2.5 mg    alteplase (CATHFLO ACTIVASE) injection 2 mg    bisacodyl (DULCOLAX) suppository 10 mg    calcium carbonate (TUMS) chewable tablet 500 mg    dexAMETHasone (DECADRON) tablet 8 mg    diphenhydrAMINE (BENADRYL) injection 50 mg    DOXOrubicin (ADRIAMYCIN) 45 mg in sodium chloride 0.9 % 1,072.5 mL infusion    enoxaparin ANTICOAGULANT (LOVENOX) injection 40 mg    EPINEPHrine PF (ADRENALIN) injection 0.3 mg    famotidine (PEPCID) injection 20 mg    famotidine (PEPCID) injection 20 mg    fentaNYL (DURAGESIC) 50 mcg/hr 72 hr patch 1 patch    And    fentaNYL (DURAGESIC) Patch in Place    heparin 100 unit/mL injection 5 mL    heparin lock flush 10 UNIT/ML injection 5-20 mL    heparin lock flush 10 UNIT/ML injection 5-20 mL    heparin lock flush 10 UNIT/ML injection 5-20 mL    hydrOXYzine (ATARAX) tablet 25 mg    Or    hydrOXYzine (ATARAX) tablet 50 mg    lidocaine (LMX4) cream    lidocaine 1 % 0.1-1 mL    LORazepam (ATIVAN) injection 0.5 mg    magnesium hydroxide (MILK OF MAGNESIA) suspension 30 mL    meperidine (DEMEROL) injection 25 mg    methocarbamol (ROBAXIN) tablet 750 mg    methylPREDNISolone sodium succinate (solu-MEDROL) injection 125 mg    naloxone (NARCAN) injection 0.2 mg    Or    naloxone (NARCAN) injection 0.4 mg    Or    naloxone (NARCAN) injection 0.2 mg    Or    naloxone (NARCAN) injection 0.4 mg    ondansetron  (ZOFRAN ODT) ODT tab 4 mg    Or    ondansetron (ZOFRAN) injection 4 mg    oxyCODONE IR (ROXICODONE) tablet 10 mg    Or    oxyCODONE (ROXICODONE) tablet 15 mg    pantoprazole (PROTONIX) EC tablet 40 mg    polyethylene glycol (MIRALAX) Packet 17 g    polyethylene glycol (MIRALAX) Packet 17 g    prochlorperazine (COMPAZINE) injection 10 mg    Or    prochlorperazine (COMPAZINE) tablet 10 mg    psyllium (METAMUCIL) wafer 2 Wafer    senna-docusate (SENOKOT-S/PERICOLACE) 8.6-50 MG per tablet 1 tablet    sennosides (SENOKOT) tablet 1 tablet    sodium chloride (PF) 0.9% PF flush 10-20 mL    sodium chloride (PF) 0.9% PF flush 3 mL    sodium chloride (PF) 0.9% PF flush 3-20 mL    traZODone (DESYREL) half-tab 25 mg

## 2023-08-23 ENCOUNTER — APPOINTMENT (OUTPATIENT)
Dept: OCCUPATIONAL THERAPY | Facility: CLINIC | Age: 43
End: 2023-08-23
Attending: STUDENT IN AN ORGANIZED HEALTH CARE EDUCATION/TRAINING PROGRAM
Payer: COMMERCIAL

## 2023-08-23 ENCOUNTER — APPOINTMENT (OUTPATIENT)
Dept: PHYSICAL THERAPY | Facility: CLINIC | Age: 43
End: 2023-08-23
Attending: STUDENT IN AN ORGANIZED HEALTH CARE EDUCATION/TRAINING PROGRAM
Payer: COMMERCIAL

## 2023-08-23 LAB
ALBUMIN SERPL BCG-MCNC: 3.1 G/DL (ref 3.5–5.2)
ALP SERPL-CCNC: 313 U/L (ref 40–129)
ALT SERPL W P-5'-P-CCNC: 109 U/L (ref 0–70)
ANION GAP SERPL CALCULATED.3IONS-SCNC: 10 MMOL/L (ref 7–15)
AST SERPL W P-5'-P-CCNC: 44 U/L (ref 0–45)
BILIRUB DIRECT SERPL-MCNC: <0.2 MG/DL (ref 0–0.3)
BILIRUB SERPL-MCNC: 0.5 MG/DL
BUN SERPL-MCNC: 20.1 MG/DL (ref 6–20)
CALCIUM SERPL-MCNC: 9.3 MG/DL (ref 8.6–10)
CHLORIDE SERPL-SCNC: 100 MMOL/L (ref 98–107)
CREAT SERPL-MCNC: 0.85 MG/DL (ref 0.67–1.17)
DEPRECATED HCO3 PLAS-SCNC: 26 MMOL/L (ref 22–29)
ERYTHROCYTE [DISTWIDTH] IN BLOOD BY AUTOMATED COUNT: 13.5 % (ref 10–15)
GFR SERPL CREATININE-BSD FRML MDRD: >90 ML/MIN/1.73M2
GLUCOSE BLDC GLUCOMTR-MCNC: 116 MG/DL (ref 70–99)
GLUCOSE SERPL-MCNC: 84 MG/DL (ref 70–99)
HCT VFR BLD AUTO: 33.9 % (ref 40–53)
HGB BLD-MCNC: 11 G/DL (ref 13.3–17.7)
MCH RBC QN AUTO: 28.1 PG (ref 26.5–33)
MCHC RBC AUTO-ENTMCNC: 32.4 G/DL (ref 31.5–36.5)
MCV RBC AUTO: 87 FL (ref 78–100)
PLATELET # BLD AUTO: 426 10E3/UL (ref 150–450)
POTASSIUM SERPL-SCNC: 3.8 MMOL/L (ref 3.4–5.3)
PROT SERPL-MCNC: 6.1 G/DL (ref 6.4–8.3)
RBC # BLD AUTO: 3.91 10E6/UL (ref 4.4–5.9)
SODIUM SERPL-SCNC: 136 MMOL/L (ref 136–145)
WBC # BLD AUTO: 7.9 10E3/UL (ref 4–11)

## 2023-08-23 PROCEDURE — 90791 PSYCH DIAGNOSTIC EVALUATION: CPT | Performed by: STUDENT IN AN ORGANIZED HEALTH CARE EDUCATION/TRAINING PROGRAM

## 2023-08-23 PROCEDURE — 97530 THERAPEUTIC ACTIVITIES: CPT | Mod: GP | Performed by: REHABILITATION PRACTITIONER

## 2023-08-23 PROCEDURE — 250N000013 HC RX MED GY IP 250 OP 250 PS 637: Performed by: STUDENT IN AN ORGANIZED HEALTH CARE EDUCATION/TRAINING PROGRAM

## 2023-08-23 PROCEDURE — 97166 OT EVAL MOD COMPLEX 45 MIN: CPT | Mod: GO

## 2023-08-23 PROCEDURE — 250N000013 HC RX MED GY IP 250 OP 250 PS 637

## 2023-08-23 PROCEDURE — 80053 COMPREHEN METABOLIC PANEL: CPT | Performed by: STUDENT IN AN ORGANIZED HEALTH CARE EDUCATION/TRAINING PROGRAM

## 2023-08-23 PROCEDURE — 250N000011 HC RX IP 250 OP 636: Mod: JZ

## 2023-08-23 PROCEDURE — 250N000012 HC RX MED GY IP 250 OP 636 PS 637: Performed by: INTERNAL MEDICINE

## 2023-08-23 PROCEDURE — 250N000011 HC RX IP 250 OP 636: Mod: JZ | Performed by: STUDENT IN AN ORGANIZED HEALTH CARE EDUCATION/TRAINING PROGRAM

## 2023-08-23 PROCEDURE — 82248 BILIRUBIN DIRECT: CPT | Performed by: STUDENT IN AN ORGANIZED HEALTH CARE EDUCATION/TRAINING PROGRAM

## 2023-08-23 PROCEDURE — 97530 THERAPEUTIC ACTIVITIES: CPT | Mod: GO

## 2023-08-23 PROCEDURE — 97535 SELF CARE MNGMENT TRAINING: CPT | Mod: GO

## 2023-08-23 PROCEDURE — 85014 HEMATOCRIT: CPT | Performed by: STUDENT IN AN ORGANIZED HEALTH CARE EDUCATION/TRAINING PROGRAM

## 2023-08-23 PROCEDURE — 99232 SBSQ HOSP IP/OBS MODERATE 35: CPT | Performed by: INTERNAL MEDICINE

## 2023-08-23 PROCEDURE — 258N000003 HC RX IP 258 OP 636: Performed by: STUDENT IN AN ORGANIZED HEALTH CARE EDUCATION/TRAINING PROGRAM

## 2023-08-23 PROCEDURE — 97110 THERAPEUTIC EXERCISES: CPT | Mod: GP | Performed by: REHABILITATION PRACTITIONER

## 2023-08-23 PROCEDURE — 250N000013 HC RX MED GY IP 250 OP 250 PS 637: Performed by: PHYSICIAN ASSISTANT

## 2023-08-23 PROCEDURE — 120N000005 HC R&B MS OVERFLOW UMMC

## 2023-08-23 RX ORDER — DEXTROSE MONOHYDRATE 50 MG/ML
10-20 INJECTION, SOLUTION INTRAVENOUS
Status: DISCONTINUED | OUTPATIENT
Start: 2023-08-23 | End: 2023-08-24

## 2023-08-23 RX ADMIN — METHOCARBAMOL 750 MG: 750 TABLET ORAL at 18:51

## 2023-08-23 RX ADMIN — Medication 5 ML: at 04:05

## 2023-08-23 RX ADMIN — ENOXAPARIN SODIUM 40 MG: 40 INJECTION SUBCUTANEOUS at 20:57

## 2023-08-23 RX ADMIN — DEXAMETHASONE 8 MG: 4 TABLET ORAL at 07:30

## 2023-08-23 RX ADMIN — METHOCARBAMOL 750 MG: 750 TABLET ORAL at 07:30

## 2023-08-23 RX ADMIN — PANTOPRAZOLE SODIUM 40 MG: 40 TABLET, DELAYED RELEASE ORAL at 07:30

## 2023-08-23 RX ADMIN — HYDROXYZINE HYDROCHLORIDE 25 MG: 25 TABLET, FILM COATED ORAL at 22:16

## 2023-08-23 RX ADMIN — DEXTROSE MONOHYDRATE 20 ML: 50 INJECTION, SOLUTION INTRAVENOUS at 22:18

## 2023-08-23 RX ADMIN — POLYETHYLENE GLYCOL 3350 17 G: 17 POWDER, FOR SOLUTION ORAL at 07:30

## 2023-08-23 RX ADMIN — Medication 2 WAFER: at 07:30

## 2023-08-23 RX ADMIN — OXYCODONE HYDROCHLORIDE 15 MG: 10 TABLET ORAL at 20:57

## 2023-08-23 RX ADMIN — DEXTROSE MONOHYDRATE 10 ML: 50 INJECTION, SOLUTION INTRAVENOUS at 22:17

## 2023-08-23 RX ADMIN — SENNOSIDES AND DOCUSATE SODIUM 1 TABLET: 8.6; 5 TABLET ORAL at 07:30

## 2023-08-23 RX ADMIN — Medication 25 MG: at 21:01

## 2023-08-23 RX ADMIN — OXYCODONE HYDROCHLORIDE 15 MG: 10 TABLET ORAL at 15:56

## 2023-08-23 RX ADMIN — Medication 5 ML: at 16:03

## 2023-08-23 RX ADMIN — HYDROXYZINE HYDROCHLORIDE 25 MG: 25 TABLET, FILM COATED ORAL at 15:56

## 2023-08-23 RX ADMIN — DEXTROSE MONOHYDRATE 360 MCG: 50 INJECTION, SOLUTION INTRAVENOUS at 22:18

## 2023-08-23 RX ADMIN — Medication 5 ML: at 23:27

## 2023-08-23 RX ADMIN — OXYCODONE HYDROCHLORIDE 15 MG: 10 TABLET ORAL at 04:05

## 2023-08-23 ASSESSMENT — ACTIVITIES OF DAILY LIVING (ADL)
ADLS_ACUITY_SCORE: 39

## 2023-08-23 NOTE — PLAN OF CARE
"Pt up for shower today and afterwards in chair doing dressing change and pt became lightheaded and dizzy with N95 mask on and BP dropped 73/57 and 73/46. Nurses helped me get him back to bed, started fluids and before fluids started /76 and checked . Gave pt juice since he only had toast for breakfast. MD called we stopped fluids he got 120 ml, we had him drink some fluids and eat some crackers. We checked VS q 15 and lowest one was 98/59 and did orthostatic HR went up 20 points other than that BP increased some with standing. Pt c/o pain this am got robaxin and this afternoon got oxycodone with atarax both helped. Pt worked with PT and OT today.  Problem: Plan of Care - These are the overarching goals to be used throughout the patient stay.    Goal: Plan of Care Review  Description: The Plan of Care Review/Shift note should be completed every shift.  The Outcome Evaluation is a brief statement about your assessment that the patient is improving, declining, or no change.  This information will be displayed automatically on your shift note.  Outcome: Progressing  Flowsheets (Taken 8/23/2023 1753)  Plan of Care Reviewed With: patient  Overall Patient Progress: no change  Goal: Patient-Specific Goal (Individualized)  Description: You can add care plan individualizations to a care plan. Examples of Individualization might be:  \"Parent requests to be called daily at 9am for status\", \"I have a hard time hearing out of my right ear\", or \"Do not touch me to wake me up as it startles me\".  Outcome: Progressing  Goal: Absence of Hospital-Acquired Illness or Injury  Outcome: Progressing  Intervention: Identify and Manage Fall Risk  Recent Flowsheet Documentation  Taken 8/23/2023 0800 by Samantha Saldana, RN  Safety Promotion/Fall Prevention:   activity supervised   assistive device/personal items within reach   clutter free environment maintained   nonskid shoes/slippers when out of bed   patient and family " education   room organization consistent   safety round/check completed  Intervention: Prevent Infection  Recent Flowsheet Documentation  Taken 8/23/2023 0800 by Samantha Saldana RN  Infection Prevention: rest/sleep promoted  Goal: Optimal Comfort and Wellbeing  Outcome: Progressing  Intervention: Monitor Pain and Promote Comfort  Recent Flowsheet Documentation  Taken 8/23/2023 1556 by Samantha Saldana RN  Pain Management Interventions: medication (see MAR)  Taken 8/23/2023 0800 by Samantha Saldana RN  Pain Management Interventions: medication (see MAR)  Intervention: Provide Person-Centered Care  Recent Flowsheet Documentation  Taken 8/23/2023 0800 by Samantha Saldana RN  Trust Relationship/Rapport:   care explained   choices provided   questions encouraged   reassurance provided   thoughts/feelings acknowledged  Goal: Readiness for Transition of Care  Outcome: Progressing     Problem: Hip Fracture Medical Management  Goal: Optimal Coping with Change in Health Status  Outcome: Progressing  Goal: Absence of Bleeding  Outcome: Progressing  Goal: Effective Bowel Elimination  Outcome: Progressing  Goal: Baseline Cognitive Function Maintained  Outcome: Progressing  Goal: Absence of Embolism  Outcome: Progressing  Goal: Fracture Stability  Outcome: Progressing  Goal: Optimal Functional Performance  Outcome: Progressing  Intervention: Promote Optimal Functional Status  Recent Flowsheet Documentation  Taken 8/23/2023 0800 by Samantha Saldana RN  Activity Management: activity adjusted per tolerance  Goal: Optimal Pain Control and Function  Outcome: Progressing  Intervention: Manage Acute Orthopaedic-Related Pain  Recent Flowsheet Documentation  Taken 8/23/2023 1556 by Samantha Saldana RN  Pain Management Interventions: medication (see MAR)  Taken 8/23/2023 0800 by Samantha Saldana RN  Pain Management Interventions: medication (see MAR)  Goal: Effective Urinary Elimination  Outcome: Progressing     Problem:  Oral Intake Inadequate  Goal: Improved Oral Intake  Outcome: Progressing   Goal Outcome Evaluation:      Plan of Care Reviewed With: patient    Overall Patient Progress: no changeOverall Patient Progress: no change

## 2023-08-23 NOTE — PROGRESS NOTES
Care Management Follow Up    Length of Stay (days): 13    Expected Discharge Date: 08/23/2023     Concerns to be Addressed:  Placement     Patient plan of care discussed at interdisciplinary rounds: Yes    Anticipated Discharge Disposition:  TCU     Anticipated Discharge Services:  Therapy  Anticipated Discharge DME:  N/A    Patient/family educated on Medicare website which has current facility and service quality ratings:  Yes  Education Provided on the Discharge Plan:  Yes  Patient/Family in Agreement with the Plan:  Yes/No    Referrals Placed by CM/SW:  Yes  Private pay costs discussed: Not applicable at this time    Additional Information:  Per care team, pt was clinically stable to transition to TCU today although a new sympomatic hypotension was observed and being treated.  TCU accepted to admit pt today for TCU level of care. When CM informed pt/spouse about  TCU acceptance, both pt/spouse declined admission to  TCU and reported that they want TCU close to home. However,  TCU is also requesting that pt become asymptomatic of hypotension before admission to  TCU. CM reach out to the care team and there was a unanimous acceptance of keeping pt for another day to monitor hypotension. More referrals sent out to community TCU by our CHW. CM will keep following to support discharge planning.     Accepted but pending treatment of hypotension   [facility]  [address], [place] [address]  Phone: [phone]    CAPRICE Perry  P: [phone]  Pager: [phone]  F: [phone]  Weekend Pager: [phone]  Weekend Coverage: 5A; 5B; 5C

## 2023-08-23 NOTE — PLAN OF CARE
"/74   Pulse 102   Temp 97.8  F (36.6  C) (Axillary)   Resp 16   Ht 1.59 m (5' 2.6\")   Wt 76.9 kg (169 lb 9.6 oz)   SpO2 94%   BMI 30.43 kg/m      AVSS on RA. A&O x4. Up with assist of 1. Denies nausea. Has pain in left hip/leg. Received robaxin x1, oxy x2, and atarax x1. Good UOP and no bm this shift. Finished last bag of doxorubicin yesterday. Continue with plan of care.    Problem: Plan of Care - These are the overarching goals to be used throughout the patient stay.    Goal: Absence of Hospital-Acquired Illness or Injury  Outcome: Progressing  Intervention: Identify and Manage Fall Risk  Recent Flowsheet Documentation  Taken 8/23/2023 0405 by Luisito Brown RN  Safety Promotion/Fall Prevention:   activity supervised   assistive device/personal items within reach   clutter free environment maintained   nonskid shoes/slippers when out of bed   patient and family education   room organization consistent   safety round/check completed  Taken 8/22/2023 2310 by Luisito Brown RN  Safety Promotion/Fall Prevention:   activity supervised   assistive device/personal items within reach   clutter free environment maintained   nonskid shoes/slippers when out of bed   patient and family education   room organization consistent   safety round/check completed  Taken 8/22/2023 1950 by Luisito Brown RN  Safety Promotion/Fall Prevention:   activity supervised   assistive device/personal items within reach   clutter free environment maintained   nonskid shoes/slippers when out of bed   patient and family education   room organization consistent   safety round/check completed  Intervention: Prevent Skin Injury  Recent Flowsheet Documentation  Taken 8/22/2023 1950 by Luisito Brown RN  Body Position: position changed independently  Intervention: Prevent Infection  Recent Flowsheet Documentation  Taken 8/23/2023 0405 by Luisito Brown RN  Infection Prevention: rest/sleep promoted  Taken 8/22/2023 2310 by Luisito Brown, " RN  Infection Prevention: rest/sleep promoted  Taken 8/22/2023 1950 by Luisito Brown RN  Infection Prevention: rest/sleep promoted  Goal: Optimal Comfort and Wellbeing  Outcome: Progressing  Intervention: Monitor Pain and Promote Comfort  Recent Flowsheet Documentation  Taken 8/23/2023 0405 by Luisito Brown RN  Pain Management Interventions: medication (see MAR)  Taken 8/22/2023 1950 by Luisito Brown RN  Pain Management Interventions: medication (see MAR)  Intervention: Provide Person-Centered Care  Recent Flowsheet Documentation  Taken 8/22/2023 1950 by Luisito Brown RN  Trust Relationship/Rapport:   care explained   choices provided   questions encouraged   reassurance provided   thoughts/feelings acknowledged     Problem: Hip Fracture Medical Management  Goal: Optimal Coping with Change in Health Status  Outcome: Progressing  Goal: Absence of Bleeding  Outcome: Progressing  Goal: Effective Bowel Elimination  Outcome: Progressing  Goal: Baseline Cognitive Function Maintained  Outcome: Progressing  Goal: Absence of Embolism  Outcome: Progressing  Goal: Fracture Stability  Outcome: Progressing  Goal: Optimal Pain Control and Function  Outcome: Progressing  Intervention: Manage Acute Orthopaedic-Related Pain  Recent Flowsheet Documentation  Taken 8/23/2023 0405 by Luisito Brown RN  Pain Management Interventions: medication (see MAR)  Taken 8/22/2023 1950 by Luisito Brown RN  Pain Management Interventions: medication (see MAR)  Goal: Effective Urinary Elimination  Outcome: Progressing     Problem: Oral Intake Inadequate  Goal: Improved Oral Intake  Outcome: Progressing

## 2023-08-23 NOTE — PROGRESS NOTES
[facility]    Medicine Progress Note - Hospitalist Service, GOLD TEAM 7    Date of Admission:  8/10/2023    Assessment & Plan   Sarbjit Swain is a 43M with hx of BRAC1 mutation, Greene's esophagus, BPH, medulloblastoma s/p  shunt, rectal cancer diagnosed 2021 s/p hemicolectomy and capecitabine partial therapy (discontinued due to an chest tightness reaction) and new osteosarcoma of the left ilium who was initially admitted on 8/10 after a pathologic left femoral neck and acetabulum fracture and underwent repair with ortho on 8/14. Transferred to [place] 8/16 on medicine team for ongoing management and initiation of chemotherapy which was started on 8/19, overall tolerated well.     Today:   -Episode of hypotension this AM - suspect vasovagal related to claustrophobia when wearing N95 mask. Resolved spontaneously. Orthostatics negative. Has been stable throughout the day, continue to monitor.   -Neulasta today per oncology   -Continue current pain regimen  -Appreciate SW assistance in TCU referrals, hopefully can discharge in next 1-2 days      Acute pathologic left femoral neck and acetabulum fracture  High grade Chondroblastic Osteosarcoma  Recently diagnosed osteosarcoma and PET CT concerning for metastatic disease to lungs. Pathologic fracture on 8/9, so transferred from [place] for closed reduction with percutaneous pinning of the L hip (8/14/23 w/ Dr. Avila). [facility] for transfers.   - Onc consulted, appreciate recs              - Started doxorubicin/cisplatin on 8/19, completed 8/22; next cycle in 35 days as outpatient   - PM&R consulted, appreciate recs              - Recommend TCU, appreciate SW assistance   - PICC line placed, port to be placed outpatient   - Fentanyl patch, oxycodone and Tylenol for pain  - Health Psychology consulted     Pulmonary lesions, suspected mets, confirmed to be metastatic osteosarcoma   Recent PET showed bilateral lesions  suspected to be mets though with his cancer hx it is not entirely clear from which primary.   - S/p bronch w/ biopsy on 8/17 - results with metastatic osteosarcoma      Delirium ( Visual and Auditory Hallucinations)- resolved  Acute Encephalopathy -- resolved  Previously having visual and audio hallucinations this hospitalization. Also having baseline confusion. Imaging showed multiple dural-based masses with vasogenic edema that are unchanged from prior imaging, meningiomas per oncology note. Most likely delirium.  - Delirium precuations  - Pain control  - Continue trazodone 25 mg at bedtime   - Continue PT/OT     Sinus tachycardia  Unclear etiology but has been stable overall.  - Continue routine vitals     Hyponatremia-  improved  Most likely SIADH vs malignancy related.  - Daily BMP     Acute on chronic cancer related pain  -Continue fentanyl patch, oxycodone PRN and Tylenol PRN     Hx of Multiple Cancers:         Stage II colorectal cancer s/p hemicolectomy now s/p re-anastomosis    Follows with Dr. Palma at [facility], last seen 8/7. Dx in 7/2021.  Underwent hemicolectomy 9/2021 followed by re-anastomosis in 12/2021.  ---   Follow up outpatient         Hx of medulloblastoma s/p  shunt   Diagnosed  At age 4. Underwent craniotomy with resection follow by radiation and chemotherapy.  Has stable right sided brain masses c/w meningiomas being followed by Neurosurgery.   --- will do a CT scan head today.        Hx of Multiple Dural based extraaxial masses ( most lilkely meningioma) and vasogenic edema        BRCA1 positive   Being followed by Oncology for hx of cancer.         Pulm lesions with concern for mets  As above       Chondroblastic Osteosarcoma  As above     Hx of hypothyroidism  Noted in chart. Per review, no abnormal TSH/T4 to fit with diagnosis. Not on pta medication         Diet: Snacks/Supplements Adult: Ensure Clear; Between Meals  Advance Diet as Tolerated: Regular Diet Adult    DVT  "Prophylaxis: Enoxaparin (Lovenox) SQ  Bran Catheter: Not present  Lines: PRESENT      PICC 08/17/23 Double Lumen Right Basilic Chemotherapy. PICC okay to use.-Site Assessment: WDL      Cardiac Monitoring: None  Code Status: Full Code      Clinically Significant Risk Factors              # Hypoalbuminemia: Lowest albumin = 3 g/dL at 8/22/2023  4:44 AM, will monitor as appropriate            # Overweight: Estimated body mass index is 29.59 kg/m  as calculated from the following:    Height as of this encounter: 1.59 m (5' 2.6\").    Weight as of this encounter: 74.8 kg (164 lb 14.4 oz).               Disposition Plan      Expected Discharge Date: 08/23/2023        Discharge Comments: Pt's accepted at  TCU but spouse is requesting for a TCU close to home. [facility] TCU refferals placed.          Doretha Anaya DO  Hospitalist Service, [address]  Securely message with [employer] (more info)  Text page via [employer] Paging/Directory   See signed in provider for up to date coverage information  ______________________________________________________________________    Interval History   No overnight events reported. This morning, episode of hypotension/pre-syncope, seems to have been related to anxiety over wearing an N95 during his PICC line dressing change. Resolved without intervention upon getting patient back in bed. By the time of my exam, patient was asymptomatic, denies dizziness or lightheadedness. No dose of oxycodone since early AM.   Report higher dose of pain medication with some improvement, particularly when given with the hydroxyzine. Prior to episode of hypotension, was able to work with PT and shower etc. Without any issues. Eating well overall.     Physical Exam   Vital Signs: Temp: 97.8  F (36.6  C) Temp src: Axillary BP: 120/81 Pulse: 107   Resp: 18 SpO2: 100 % O2 Device: None (Room air)    Weight: 164 lbs 14.4 oz    Constitutional: no apparent " distress, pleasant and cooperative   Cardiovascular: regular rate and rhythm, normal S1 and S2,   Respiratory: clear to auscultation bilaterally, no wheezing, rales, or rhonchi, normal work of breathing   GI: abdomen soft, non tender, non distended, bowel sounds present   Neuro: alert and oriented, no gross focal deficits noted      Medical Decision Making           Data     I have personally reviewed the following data over the past 24 hrs:    7.9  \   11.0 (L)   / 426     136 100 20.1 (H) /  116 (H)   3.8 26 0.85 \     ALT: 109 (H) AST: 44 AP: 313 (H) TBILI: 0.5   ALB: 3.1 (L) TOT PROTEIN: 6.1 (L) LIPASE: N/A       Imaging results reviewed over the past 24 hrs:   No results found for this or any previous visit (from the past 24 hour(s)).

## 2023-08-23 NOTE — CONSULTS
"  Health Psychology                                                                                                                          Vanessa Lyles, Ph.D., L.P [phone]  Emerald Wynn, Ph.D., L.P. [phone]  Sandy Sears, Ph.D, L..P. [phone]  Cindy Khan, Ph.D., L.P. [phone]  Navneet Green, Ph.D., A.B.P.P., L.P. [phone]         Kennedi Patterson, Ph.D., L.P. [phone]       Xochilt Arora, Ph.D., A.B.P.P., LP [phone]           [facility] [place], 3rd Floor  [address] [place] [address]      Confidential Summary of Inpatient Health Psychology Consultation*    Date of Service:  08/23/23    Referring Provider:  Dr. Sanches    Reason for Consultation:  Support in coping with new diagnosis of cancer with history of multiple cancers.     Sources of Information:  Information was obtained from a clinical interview with the patient and review of medical record.    History of Present Illness:  Per EMR: \"Sarbjit Swain is a 43M with hx of BRAC1 mutation, Greene's esophagus, BPH, medulloblastoma s/p  shunt, rectal cancer diagnosed 2021 s/p hemicolectomy and capecitabine partial therapy (discontinued due to an chest tightness reaction) and new osteosarcoma of the left ilium who was initially admitted on 8/10 after a pathologic left femoral neck and acetabulum fracture and underwent repair with ortho on 8/14. Transferred to [place] 8/16 on medicine team for ongoing management and initiation of chemotherapy which was started on 8/19, overall tolerated well.\"    Met with Theo today for a longer visit. He shared experiences with the diganostic process in past months, how his symptoms began and the difficulty obtaining an accurate diagnosis. Regarding admission and treatment plan Theo is most concerned with his hip recovery and being able to walk again. He is very fearful he won't be able to and thus wouldn't be able to work. We discussed " other fears and sources of sadness and grief and ways to manage the emotional weight.     Reviewed sources of support, Theo shared that his family are his primary support and that he hasn't been someone who typically has close friends.     Theo said that he feels hopeful about his recovery and future and rated himself as 8 out of 10 on a scale of hopefulness where 0 = completely hopeless and 10 = completely hopeful. When providing his rating, he was tearful, and also said that he thinks he has been feeling depressed. We discussed that his rating likely indicates how he wants to feel but may be discrepant from his actual emotional experiences. Discussed depression more broadly and provided definition of clinical depression. When we were first meeting, Theo's wife was present and shared that she thinks Theo is having a lot of emotions and difficulty expressing them. Discussed ways to manage emotions, share them, etc. Validated his emotional experiences.       Medical History:  See below lists for past medical history, past surgical history, and current medications    History reviewed. No pertinent past medical history.    Past Surgical History:   Procedure Laterality Date     ENDOBRONCHIAL ULTRASOUND FLEXIBLE N/A 8/17/2023    Procedure: BRONCHOSCOPY,  WITH ENDOBRONCHIAL ULTRASOUND GUIDANCE;  Surgeon: Tomas London MD;  Location: [facility] OR     HIP PINNING Left 08/14/2023    Procedure: OPEN REDUCTION INTERNAL FIXATION LEFT FEMORAL NECK;  Surgeon: Dann Avila MD;  Location: [facility] OR     PICC DOUBLE LUMEN PLACEMENT Right 08/17/2023    5FR DL PICc, basilic vein. L-38cm, 2cm out.       Current Facility-Administered Medications   Medication     0.9% sodium chloride BOLUS     acetaminophen (TYLENOL) tablet 650 mg     albuterol (PROVENTIL HFA/VENTOLIN HFA) inhaler     albuterol (PROVENTIL) neb solution 2.5 mg     alteplase (CATHFLO ACTIVASE) injection 2 mg     bisacodyl (DULCOLAX) suppository 10 mg     calcium  carbonate (TUMS) chewable tablet 500 mg     diphenhydrAMINE (BENADRYL) injection 50 mg     enoxaparin ANTICOAGULANT (LOVENOX) injection 40 mg     EPINEPHrine PF (ADRENALIN) injection 0.3 mg     famotidine (PEPCID) injection 20 mg     famotidine (PEPCID) injection 20 mg     fentaNYL (DURAGESIC) 50 mcg/hr 72 hr patch 1 patch    And     fentaNYL (DURAGESIC) Patch in Place     heparin 100 unit/mL injection 5 mL     heparin lock flush 10 UNIT/ML injection 5-20 mL     heparin lock flush 10 UNIT/ML injection 5-20 mL     heparin lock flush 10 UNIT/ML injection 5-20 mL     hydrOXYzine (ATARAX) tablet 25 mg    Or     hydrOXYzine (ATARAX) tablet 50 mg     lidocaine (LMX4) cream     lidocaine 1 % 0.1-1 mL     LORazepam (ATIVAN) injection 0.5 mg     magnesium hydroxide (MILK OF MAGNESIA) suspension 30 mL     meperidine (DEMEROL) injection 25 mg     methocarbamol (ROBAXIN) tablet 750 mg     methylPREDNISolone sodium succinate (solu-MEDROL) injection 125 mg     naloxone (NARCAN) injection 0.2 mg    Or     naloxone (NARCAN) injection 0.4 mg    Or     naloxone (NARCAN) injection 0.2 mg    Or     naloxone (NARCAN) injection 0.4 mg     ondansetron (ZOFRAN ODT) ODT tab 4 mg    Or     ondansetron (ZOFRAN) injection 4 mg     oxyCODONE IR (ROXICODONE) tablet 10 mg    Or     oxyCODONE (ROXICODONE) tablet 15 mg     pantoprazole (PROTONIX) EC tablet 40 mg     polyethylene glycol (MIRALAX) Packet 17 g     polyethylene glycol (MIRALAX) Packet 17 g     prochlorperazine (COMPAZINE) injection 10 mg    Or     prochlorperazine (COMPAZINE) tablet 10 mg     psyllium (METAMUCIL) wafer 2 Wafer     senna-docusate (SENOKOT-S/PERICOLACE) 8.6-50 MG per tablet 1 tablet     sennosides (SENOKOT) tablet 1 tablet     sodium chloride (PF) 0.9% PF flush 10-20 mL     sodium chloride (PF) 0.9% PF flush 3 mL     sodium chloride (PF) 0.9% PF flush 3-20 mL     traZODone (DESYREL) half-tab 25 mg         Psychiatric History:  Theo said that he was prescribed an  antidepressant medication in the past as a teenager but did not like it and is not interested in this medication now. He has no history of suicidal ideation or suicide attempts. No history of self-harm. No history of OCD, disordered eating. He has certainly experiences multiple acute health stressors, denied PTSD currently. He endorses patterns of depressive symptoms.     Social History:  Theo is  and has one child with his wife, a son who started 6th grade this week. He is the oldest of three children, he has a younger brother and sister. His parents are still living. He works in FDC services at a local school.     Mental Status/Interview:  Appearance/Behavior/Orientation: Alert and oriented to person, place, time, and situation. No evidence of psychomotor agitation.    Cooperation/Reliability: Patient appeared to honestly respond to questions about psychosocial functioning and is deemed a reliable historian.   Cognition/Memory/Judgment:  Not formally assessed, yet no difficulties apparent upon interview. Fund of knowledge consistent with age, level of education, and life experience. Abstract reasoning appropriate, no difficulties with judgment apparent.  Speech/Language: Speech was clear, logical and coherent, of normal rate, rhythm and volume.   Thought Content/Form: Appropriate to interview and situation. Overall logical and organized. Patient denied any difficulties with a thought disorder, including auditory or visual hallucinations. Denied any history of obsessive-compulsive disorder or of yolie.   Mood/Affect:  Mood sad; affect was mood congruent, flat overall, tearful at times.  Appetite: Did not assess  Insight/Motivation: Fair  Suicide/Assault:  Patient denies suicidal or assaultive ideation, plan, or intent.     Impression:  Theo endorses symptoms of depression related to adjustment to new diagnosis of cancer, recent surgery, and impacts of health on his life. He has some discomfort with  expressing emotions but was ultimately open to talking about additional ways to deal with them. He would benefit from ongoing education about recovery from surgery and what to expect for mobility - the range of possibilities.    Diagnosis:  Adjustment disorder with depressed mood    Recommendation/Plan:    Should he remain admitted we can follow-up next week. No outpatient follow-up at this time. He may benefit from evaluation for antidepressant although he was ambivalent about this as a treatment option.     Visit start time: 11:50  Visit end time: 12:30           Sandy Sears, PhD,   Clinical Health Psychologist  Phone: [phone]  Pager: [phone]      *In accordance with the Rules of the [place] [employer], it is noted that psychological descriptions and scientific procedures underlying psychological evaluations have limitations.  Absolute predictions cannot be made based on information in this report.

## 2023-08-23 NOTE — PROGRESS NOTES
Chemo drug: Doxorubicin  Tolerated: Well  Intervention: None needed. Blood return noted Q4 hours and post infusion.  Response: NA  Plan: Continue with plan of care

## 2023-08-23 NOTE — PROGRESS NOTES
Occupational Therapy Evaluation 08/23/23 1430   Appointment Info   Signing Clinician's Name / Credentials (OT) Danielle Woody OTR/L   Rehab Comments (OT) [facility] LLE   Living Environment   People in Home spouse;child(pauline), dependent   Current Living Arrangements house   Home Accessibility stairs to enter home;stairs within home   Number of Stairs, Main Entrance 2   Stair Railings, Main Entrance none   Number of Stairs, Within Home, Primary four   Stair Railings, Within Home, Primary railings safe and in good condition   Transportation Anticipated family or friend will provide   Living Environment Comments Pt's house has a tub/shower with a shower chair. Pt previously obtained a tub transfer bench, but it would not fit in the alotted space.   Self-Care   Usual Activity Tolerance good   Current Activity Tolerance fair   Equipment Currently Used at Home shower chair;crutches;wheelchair, manual   Fall history within last six months yes   Number of times patient has fallen within last six months 2   Activity/Exercise/Self-Care Comment Pt is IND in ADLs at baseline.   Instrumental Activities of Daily Living (IADL)   IADL Comments Pt is IND in IADLs at baseline.   General Information   Onset of Illness/Injury or Date of Surgery 08/10/23   Referring Physician Keven Tan MD   Patient/Family Therapy Goal Statement (OT) Return to work, be able to walk and use the toilet independently again.   Additional Occupational Profile Info/Pertinent History of Current Problem Sarbjit Swain is a 43M with hx of BRAC1 mutation, Greene's esophagus, BPH, medulloblastoma s/p  shunt, rectal cancer diagnosed 2021 s/p hemicolectomy and capecitabine partial therapy (discontinued due to an chest tightness reaction) and new osteosarcoma of the left ilium who was initially admitted on 8/10 after a pathologic left femoral neck and acetabulum fracture and underwent repair with ortho on 8/14. Transferred to [place] 8/16 on medicine  team for ongoing management and initiation of chemotherapy which was started on 8/19, overall tolerated well.   Existing Precautions/Restrictions fall;weight bearing   Left Upper Extremity (Weight-bearing Status) full weight-bearing (FWB)   Right Upper Extremity (Weight-bearing Status) full weight-bearing (FWB)   Left Lower Extremity (Weight-bearing Status) toe touch weight-bearing (TTWB)   Right Lower Extremity (Weight-bearing Status) full weight-bearing (FWB)   Cognitive Status Examination   Orientation Status orientation to person, place and time   Affect/Mental Status (Cognitive) WFL   Follows Commands WFL   Range of Motion Comprehensive   General Range of Motion no range of motion deficits identified   Strength Comprehensive (MMT)   General Manual Muscle Testing (MMT) Assessment other (see comments)   Comment, General Manual Muscle Testing (MMT) Assessment Generalized weakness   Coordination   Upper Extremity Coordination No deficits were identified   Bed Mobility   Bed Mobility sit-supine   Sit-Supine Archer (Bed Mobility) minimum assist (75% patient effort)   Transfers   Transfers sit-stand transfer   Sit-Stand Transfer   Sit-Stand Archer (Transfers) minimum assist (75% patient effort)   Assistive Device (Sit-Stand Transfers) walker, front-wheeled   Activities of Daily Living   BADL Assessment/Intervention bathing;lower body dressing;grooming;toileting   Bathing Assessment/Intervention   Archer Level (Bathing) moderate assist (50% patient effort);set up   Comment, (Bathing) Per clinical judgement   Assistive Devices (Bathing) grab bar, tub/shower;shower chair   Lower Body Dressing Assessment/Training   Assistive Devices (Lower Body Dressing) reacher;sock-aid   Comment, (Lower Body Dressing) Per clinical judgement   Archer Level (Lower Body Dressing) moderate assist (50% patient effort);set up   Grooming Assessment/Training   Archer Level (Grooming) modified independence;set up    Comment, (Grooming) Per clinical judgement   Toileting   Assistive Devices (Toileting) commode chair   Comment, (Toileting) Per clinical judgement   Lincoln Level (Toileting) moderate assist (50% patient effort);set up   Clinical Impression   Criteria for Skilled Therapeutic Interventions Met (OT) Yes, treatment indicated   OT Diagnosis Decreased ADL/IADL IND   OT Problem List-Impairments impacting ADL problems related to;activity tolerance impaired;mobility;strength;pain;post-surgical precautions   Assessment of Occupational Performance 5 or more Performance Deficits   Identified Performance Deficits LB dressing, LB bathing, toileting, functional transfers, work, home mgmt   Planned Therapy Interventions (OT) ADL retraining;IADL retraining;strengthening;transfer training;progressive activity/exercise   Clinical Decision Making Complexity (OT) moderate complexity   Anticipated Equipment Needs Upon Discharge (OT) walker, rolling   Risk & Benefits of therapy have been explained evaluation/treatment results reviewed;current/potential barriers reviewed;care plan/treatment goals reviewed;participants included;patient;mother   Clinical Impression Comments Pt presents to IP OT with impaired ADL/IADL IND. Pt would benefit from OT services to progress functional performance and support safe d/c planning.   OT Goals   Therapy Frequency (OT) 5 times/wk   OT Predicted Duration/Target Date for Goal Attainment 09/13/23   OT Goals Lower Body Dressing;Lower Body Bathing;Toilet Transfer/Toileting;Transfers   OT: Lower Body Dressing Modified independent;within precautions   OT: Lower Body Bathing Modified independent;with precautions   OT: Transfer Modified independent;with assistive device;within precautions  (Tub transfer)   OT: Toilet Transfer/Toileting Modified independent;toilet transfer;cleaning and garment management;within precautions   Interventions   Interventions Quick Adds Self-Care/Home Management;Therapeutic  Activity   OT Discharge Planning   OT Plan Practice LB dressing with reacher, functional transfers to recliner and BSC, issue UB strengthening program   OT Discharge Recommendation (DC Rec) Transitional Care Facility   OT Rationale for DC Rec Pt currently performing far below functional baseline, limited by strength, activity tolerance, pain, and weightbearing precautions. Pt currently requiring Min A x 1 with FWW for pivot transfers, Min A for bed mobility, A x 1 for ADLs. Pt needs to complete stairs at home. Recommend d/c to TCU to continue progressing functional IND and safety.   OT Brief overview of current status Min A x 1 with FWW for pivots

## 2023-08-23 NOTE — PROGRESS NOTES
Care Management Follow Up    Length of Stay (days): 13    Expected Discharge Date: 08/23/2023     Concerns to be Addressed:     discharge planning  Patient plan of care discussed at interdisciplinary rounds: Yes    Anticipated Discharge Disposition:  TCU     Anticipated Discharge Services:  Therapy Services  Anticipated Discharge DME:  N/A    Patient/family educated on Medicare website which has current facility and service quality ratings:   Yes  Education Provided on the Discharge Plan:  Yes  Patient/Family in Agreement with the Plan:   Yes    Referrals Placed by CM/SW:      [facility]  [address] [facility] [address]  P: [phone], F: [phone]  8/23: Initial referral sent via hard fax     [facility]  [address] [facility] [address]  P: [phone], F: [phone]  8/23: Initial referral sent via hard fax     [facility]  [address] [facility] [address]  P: [phone], F: [phone]  8/23: Initial referral sent via hard fax     [facility]  [address] [facility] [address]  P: [phone], F: [phone]  8/23: Initial referral sent via hard fax     [facility]  [address], [place] [address]  P: [phone], F: [phone]  8/23: Initial referral sent via hard fax     [facility]  [address], [place] [address]  P: [phone], F: [phone]  8/23: Initial referral sent via hard fax     Private pay costs discussed: Not applicable    Additional Information:  CHW to continue to follow up with referrals tomorrow, 8/24/2023.    [email or web address]  [phone]

## 2023-08-24 ENCOUNTER — HOSPITAL ENCOUNTER (INPATIENT)
Facility: SKILLED NURSING FACILITY | Age: 43
LOS: 15 days | Discharge: HOME HEALTH | End: 2023-09-08
Attending: INTERNAL MEDICINE | Admitting: INTERNAL MEDICINE
Payer: COMMERCIAL

## 2023-08-24 VITALS
HEIGHT: 63 IN | WEIGHT: 164.8 LBS | DIASTOLIC BLOOD PRESSURE: 70 MMHG | SYSTOLIC BLOOD PRESSURE: 106 MMHG | TEMPERATURE: 97.9 F | RESPIRATION RATE: 18 BRPM | HEART RATE: 115 BPM | BODY MASS INDEX: 29.2 KG/M2 | OXYGEN SATURATION: 96 %

## 2023-08-24 DIAGNOSIS — R53.81 PHYSICAL DECONDITIONING: Primary | ICD-10-CM

## 2023-08-24 DIAGNOSIS — S72.002A LEFT DISPLACED FEMORAL NECK FRACTURE (H): ICD-10-CM

## 2023-08-24 DIAGNOSIS — C40.20 OSTEOSARCOMA OF TIBIA, UNSPECIFIED LATERALITY (H): ICD-10-CM

## 2023-08-24 LAB
ALBUMIN SERPL BCG-MCNC: 3.3 G/DL (ref 3.5–5.2)
ALP SERPL-CCNC: 299 U/L (ref 40–129)
ALT SERPL W P-5'-P-CCNC: 104 U/L (ref 0–70)
ANION GAP SERPL CALCULATED.3IONS-SCNC: 12 MMOL/L (ref 7–15)
AST SERPL W P-5'-P-CCNC: 40 U/L (ref 0–45)
BASOPHILS # BLD AUTO: 0 10E3/UL (ref 0–0.2)
BASOPHILS NFR BLD AUTO: 0 %
BILIRUB DIRECT SERPL-MCNC: <0.2 MG/DL (ref 0–0.3)
BILIRUB SERPL-MCNC: 0.6 MG/DL
BUN SERPL-MCNC: 22 MG/DL (ref 6–20)
CALCIUM SERPL-MCNC: 9 MG/DL (ref 8.6–10)
CHLORIDE SERPL-SCNC: 99 MMOL/L (ref 98–107)
CREAT SERPL-MCNC: 0.8 MG/DL (ref 0.67–1.17)
DEPRECATED HCO3 PLAS-SCNC: 26 MMOL/L (ref 22–29)
EOSINOPHIL # BLD AUTO: 0.1 10E3/UL (ref 0–0.7)
EOSINOPHIL NFR BLD AUTO: 0 %
ERYTHROCYTE [DISTWIDTH] IN BLOOD BY AUTOMATED COUNT: 13.4 % (ref 10–15)
GFR SERPL CREATININE-BSD FRML MDRD: >90 ML/MIN/1.73M2
GLUCOSE SERPL-MCNC: 93 MG/DL (ref 70–99)
HCT VFR BLD AUTO: 33.6 % (ref 40–53)
HGB BLD-MCNC: 10.9 G/DL (ref 13.3–17.7)
IMM GRANULOCYTES # BLD: 0.3 10E3/UL
IMM GRANULOCYTES NFR BLD: 1 %
LYMPHOCYTES # BLD AUTO: 2.2 10E3/UL (ref 0.8–5.3)
LYMPHOCYTES NFR BLD AUTO: 11 %
MCH RBC QN AUTO: 27.9 PG (ref 26.5–33)
MCHC RBC AUTO-ENTMCNC: 32.4 G/DL (ref 31.5–36.5)
MCV RBC AUTO: 86 FL (ref 78–100)
MONOCYTES # BLD AUTO: 0.2 10E3/UL (ref 0–1.3)
MONOCYTES NFR BLD AUTO: 1 %
NEUTROPHILS # BLD AUTO: 16.9 10E3/UL (ref 1.6–8.3)
NEUTROPHILS NFR BLD AUTO: 87 %
NRBC # BLD AUTO: 0 10E3/UL
NRBC BLD AUTO-RTO: 0 /100
PATH REPORT.COMMENTS IMP SPEC: ABNORMAL
PATH REPORT.COMMENTS IMP SPEC: YES
PATH REPORT.FINAL DX SPEC: ABNORMAL
PATH REPORT.GROSS SPEC: ABNORMAL
PATH REPORT.MICROSCOPIC SPEC OTHER STN: ABNORMAL
PATH REPORT.RELEVANT HX SPEC: ABNORMAL
PATH REPORT.RELEVANT HX SPEC: ABNORMAL
PATH REPORT.SITE OF ORIGIN SPEC: ABNORMAL
PLATELET # BLD AUTO: 401 10E3/UL (ref 150–450)
POTASSIUM SERPL-SCNC: 3.8 MMOL/L (ref 3.4–5.3)
PROT SERPL-MCNC: 6.1 G/DL (ref 6.4–8.3)
RBC # BLD AUTO: 3.91 10E6/UL (ref 4.4–5.9)
SODIUM SERPL-SCNC: 137 MMOL/L (ref 136–145)
WBC # BLD AUTO: 19.2 10E3/UL (ref 4–11)
WBC # BLD AUTO: 19.2 10E3/UL (ref 4–11)

## 2023-08-24 PROCEDURE — 250N000013 HC RX MED GY IP 250 OP 250 PS 637: Performed by: PHYSICIAN ASSISTANT

## 2023-08-24 PROCEDURE — 85014 HEMATOCRIT: CPT | Performed by: STUDENT IN AN ORGANIZED HEALTH CARE EDUCATION/TRAINING PROGRAM

## 2023-08-24 PROCEDURE — 250N000013 HC RX MED GY IP 250 OP 250 PS 637: Performed by: STUDENT IN AN ORGANIZED HEALTH CARE EDUCATION/TRAINING PROGRAM

## 2023-08-24 PROCEDURE — 99239 HOSP IP/OBS DSCHRG MGMT >30: CPT | Performed by: INTERNAL MEDICINE

## 2023-08-24 PROCEDURE — 250N000011 HC RX IP 250 OP 636: Mod: JZ | Performed by: STUDENT IN AN ORGANIZED HEALTH CARE EDUCATION/TRAINING PROGRAM

## 2023-08-24 PROCEDURE — 82248 BILIRUBIN DIRECT: CPT | Performed by: STUDENT IN AN ORGANIZED HEALTH CARE EDUCATION/TRAINING PROGRAM

## 2023-08-24 PROCEDURE — 250N000013 HC RX MED GY IP 250 OP 250 PS 637: Performed by: INTERNAL MEDICINE

## 2023-08-24 PROCEDURE — 250N000011 HC RX IP 250 OP 636: Mod: JZ | Performed by: PHYSICIAN ASSISTANT

## 2023-08-24 PROCEDURE — 120N000009 HC R&B SNF

## 2023-08-24 PROCEDURE — 99207 PR SERVICE NOT STAFFED W/SUPERV PROV: CPT | Performed by: INTERNAL MEDICINE

## 2023-08-24 PROCEDURE — 250N000013 HC RX MED GY IP 250 OP 250 PS 637

## 2023-08-24 RX ORDER — POLYETHYLENE GLYCOL 3350 17 G/17G
17 POWDER, FOR SOLUTION ORAL DAILY
Status: ON HOLD | DISCHARGE
Start: 2023-08-25 | End: 2023-09-07

## 2023-08-24 RX ORDER — AMOXICILLIN 250 MG
1 CAPSULE ORAL 2 TIMES DAILY
Status: DISCONTINUED | OUTPATIENT
Start: 2023-08-24 | End: 2023-08-26

## 2023-08-24 RX ORDER — ACETAMINOPHEN 325 MG/1
975 TABLET ORAL 3 TIMES DAILY
Status: DISCONTINUED | OUTPATIENT
Start: 2023-08-24 | End: 2023-08-24

## 2023-08-24 RX ORDER — TRAZODONE HYDROCHLORIDE 50 MG/1
25 TABLET, FILM COATED ORAL AT BEDTIME
Status: ON HOLD | DISCHARGE
Start: 2023-08-24 | End: 2023-09-07

## 2023-08-24 RX ORDER — LORATADINE 10 MG/1
10 TABLET ORAL DAILY PRN
Status: DISCONTINUED | OUTPATIENT
Start: 2023-08-24 | End: 2023-09-08 | Stop reason: HOSPADM

## 2023-08-24 RX ORDER — FENTANYL 50 UG/1
50 PATCH TRANSDERMAL
Status: DISCONTINUED | OUTPATIENT
Start: 2023-08-27 | End: 2023-09-01

## 2023-08-24 RX ORDER — ACETAMINOPHEN 325 MG/1
975 TABLET ORAL 3 TIMES DAILY
Status: DISCONTINUED | OUTPATIENT
Start: 2023-08-24 | End: 2023-09-08 | Stop reason: HOSPADM

## 2023-08-24 RX ORDER — AMOXICILLIN 250 MG
1 CAPSULE ORAL 2 TIMES DAILY
Status: ON HOLD | DISCHARGE
Start: 2023-08-24 | End: 2023-09-07

## 2023-08-24 RX ORDER — HYDROXYZINE HYDROCHLORIDE 25 MG/1
25 TABLET, FILM COATED ORAL EVERY 6 HOURS PRN
Status: ON HOLD | DISCHARGE
Start: 2023-08-24 | End: 2023-09-07

## 2023-08-24 RX ORDER — MAGNESIUM HYDROXIDE/ALUMINUM HYDROXICE/SIMETHICONE 120; 1200; 1200 MG/30ML; MG/30ML; MG/30ML
30 SUSPENSION ORAL EVERY 4 HOURS PRN
Status: DISCONTINUED | OUTPATIENT
Start: 2023-08-24 | End: 2023-09-08 | Stop reason: HOSPADM

## 2023-08-24 RX ORDER — PSYLLIUM SEED (WITH DEXTROSE)
2 POWDER (GRAM) ORAL DAILY
Status: ON HOLD | DISCHARGE
Start: 2023-08-25 | End: 2023-09-07

## 2023-08-24 RX ORDER — NALOXONE HYDROCHLORIDE 0.4 MG/ML
0.4 INJECTION, SOLUTION INTRAMUSCULAR; INTRAVENOUS; SUBCUTANEOUS
Status: DISCONTINUED | OUTPATIENT
Start: 2023-08-24 | End: 2023-09-08 | Stop reason: HOSPADM

## 2023-08-24 RX ORDER — CALCIUM CARBONATE 500 MG/1
1000 TABLET, CHEWABLE ORAL 4 TIMES DAILY PRN
Status: DISCONTINUED | OUTPATIENT
Start: 2023-08-24 | End: 2023-09-08 | Stop reason: HOSPADM

## 2023-08-24 RX ORDER — ONDANSETRON 4 MG/1
4 TABLET, ORALLY DISINTEGRATING ORAL EVERY 6 HOURS PRN
Status: DISCONTINUED | OUTPATIENT
Start: 2023-08-24 | End: 2023-09-08 | Stop reason: HOSPADM

## 2023-08-24 RX ORDER — OXYCODONE HYDROCHLORIDE 10 MG/1
10 TABLET ORAL EVERY 4 HOURS PRN
Status: DISCONTINUED | OUTPATIENT
Start: 2023-08-24 | End: 2023-09-08 | Stop reason: HOSPADM

## 2023-08-24 RX ORDER — PANTOPRAZOLE SODIUM 40 MG/1
40 TABLET, DELAYED RELEASE ORAL DAILY
Status: DISCONTINUED | OUTPATIENT
Start: 2023-08-25 | End: 2023-09-08 | Stop reason: HOSPADM

## 2023-08-24 RX ORDER — NALOXONE HYDROCHLORIDE 0.4 MG/ML
0.2 INJECTION, SOLUTION INTRAMUSCULAR; INTRAVENOUS; SUBCUTANEOUS
Status: DISCONTINUED | OUTPATIENT
Start: 2023-08-24 | End: 2023-09-08 | Stop reason: HOSPADM

## 2023-08-24 RX ORDER — METHOCARBAMOL 750 MG/1
750 TABLET, FILM COATED ORAL EVERY 6 HOURS PRN
Status: ON HOLD | DISCHARGE
Start: 2023-08-24 | End: 2023-09-07

## 2023-08-24 RX ORDER — OXYCODONE HYDROCHLORIDE 5 MG/1
10-15 TABLET ORAL EVERY 4 HOURS PRN
Qty: 12 TABLET | Refills: 0 | Status: ON HOLD | OUTPATIENT
Start: 2023-08-24 | End: 2023-09-07

## 2023-08-24 RX ORDER — ACETAMINOPHEN 325 MG/1
650 TABLET ORAL EVERY 4 HOURS PRN
Status: ON HOLD | DISCHARGE
Start: 2023-08-24 | End: 2023-09-07

## 2023-08-24 RX ORDER — POLYETHYLENE GLYCOL 3350 17 G/17G
17 POWDER, FOR SOLUTION ORAL DAILY
Status: DISCONTINUED | OUTPATIENT
Start: 2023-08-25 | End: 2023-08-26

## 2023-08-24 RX ORDER — PSYLLIUM SEED (WITH DEXTROSE)
2 POWDER (GRAM) ORAL DAILY
Status: DISCONTINUED | OUTPATIENT
Start: 2023-08-25 | End: 2023-08-26

## 2023-08-24 RX ORDER — HYDROXYZINE HYDROCHLORIDE 25 MG/1
25-50 TABLET, FILM COATED ORAL EVERY 6 HOURS PRN
Status: DISCONTINUED | OUTPATIENT
Start: 2023-08-24 | End: 2023-09-08 | Stop reason: HOSPADM

## 2023-08-24 RX ORDER — ENOXAPARIN SODIUM 100 MG/ML
40 INJECTION SUBCUTANEOUS EVERY 24 HOURS
Status: DISCONTINUED | OUTPATIENT
Start: 2023-08-24 | End: 2023-09-08 | Stop reason: HOSPADM

## 2023-08-24 RX ORDER — ONDANSETRON 2 MG/ML
4 INJECTION INTRAMUSCULAR; INTRAVENOUS EVERY 6 HOURS PRN
Status: DISCONTINUED | OUTPATIENT
Start: 2023-08-24 | End: 2023-09-08 | Stop reason: HOSPADM

## 2023-08-24 RX ORDER — METHOCARBAMOL 750 MG/1
750 TABLET, FILM COATED ORAL EVERY 6 HOURS PRN
Status: DISCONTINUED | OUTPATIENT
Start: 2023-08-24 | End: 2023-09-08 | Stop reason: HOSPADM

## 2023-08-24 RX ORDER — BISACODYL 10 MG
10 SUPPOSITORY, RECTAL RECTAL 2 TIMES DAILY PRN
Status: DISCONTINUED | OUTPATIENT
Start: 2023-08-24 | End: 2023-09-08 | Stop reason: HOSPADM

## 2023-08-24 RX ORDER — PANTOPRAZOLE SODIUM 40 MG/1
40 TABLET, DELAYED RELEASE ORAL DAILY
Status: ON HOLD | DISCHARGE
Start: 2023-08-25 | End: 2023-09-07

## 2023-08-24 RX ORDER — FENTANYL 50 UG/1
1 PATCH TRANSDERMAL
Qty: 1 PATCH | Refills: 0 | Status: ON HOLD | OUTPATIENT
Start: 2023-08-27 | End: 2023-09-07

## 2023-08-24 RX ORDER — HYDROXYZINE HYDROCHLORIDE 50 MG/1
50 TABLET, FILM COATED ORAL EVERY 6 HOURS PRN
Status: ON HOLD | DISCHARGE
Start: 2023-08-24 | End: 2023-09-07

## 2023-08-24 RX ADMIN — ACETAMINOPHEN 975 MG: 325 TABLET, FILM COATED ORAL at 20:58

## 2023-08-24 RX ADMIN — PANTOPRAZOLE SODIUM 40 MG: 40 TABLET, DELAYED RELEASE ORAL at 07:21

## 2023-08-24 RX ADMIN — HYDROXYZINE HYDROCHLORIDE 25 MG: 25 TABLET, FILM COATED ORAL at 08:10

## 2023-08-24 RX ADMIN — SENNOSIDES AND DOCUSATE SODIUM 1 TABLET: 8.6; 5 TABLET ORAL at 07:21

## 2023-08-24 RX ADMIN — FENTANYL 1 PATCH: 50 PATCH TRANSDERMAL at 07:22

## 2023-08-24 RX ADMIN — Medication 25 MG: at 21:00

## 2023-08-24 RX ADMIN — METHOCARBAMOL 750 MG: 750 TABLET ORAL at 03:59

## 2023-08-24 RX ADMIN — ENOXAPARIN SODIUM 40 MG: 40 INJECTION SUBCUTANEOUS at 20:58

## 2023-08-24 RX ADMIN — OXYCODONE HYDROCHLORIDE 15 MG: 10 TABLET ORAL at 08:10

## 2023-08-24 RX ADMIN — Medication 2 WAFER: at 07:21

## 2023-08-24 RX ADMIN — OXYCODONE HYDROCHLORIDE 15 MG: 10 TABLET ORAL at 15:37

## 2023-08-24 RX ADMIN — METHOCARBAMOL 750 MG: 750 TABLET ORAL at 16:33

## 2023-08-24 RX ADMIN — OXYCODONE HYDROCHLORIDE 15 MG: 10 TABLET ORAL at 03:58

## 2023-08-24 RX ADMIN — HYDROXYZINE HYDROCHLORIDE 25 MG: 25 TABLET, FILM COATED ORAL at 15:38

## 2023-08-24 RX ADMIN — Medication 10 ML: at 15:30

## 2023-08-24 ASSESSMENT — ACTIVITIES OF DAILY LIVING (ADL)
DRESS: 1-->ASSISTANCE (EQUIPMENT/PERSON) NEEDED (NOT DEVELOPMENTALLY APPROPRIATE)
TOILETING: 1-->ASSISTANCE (EQUIPMENT/PERSON) NEEDED (NOT DEVELOPMENTALLY APPROPRIATE)
ADLS_ACUITY_SCORE: 39
ADLS_ACUITY_SCORE: 39
DIFFICULTY_COMMUNICATING: YES
DRESSING/BATHING: BATHING DIFFICULTY, ASSISTANCE 1 PERSON
ADLS_ACUITY_SCORE: 39
BATHING: 1-->ASSISTANCE NEEDED
WALKING_OR_CLIMBING_STAIRS_DIFFICULTY: YES
TOILETING_ISSUES: YES
DRESSING/BATHING_DIFFICULTY: YES
ADLS_ACUITY_SCORE: 31
CHANGE_IN_FUNCTIONAL_STATUS_SINCE_ONSET_OF_CURRENT_ILLNESS/INJURY: NO
ADLS_ACUITY_SCORE: 33
ADLS_ACUITY_SCORE: 39
ADLS_ACUITY_SCORE: 39
FALL_HISTORY_WITHIN_LAST_SIX_MONTHS: YES
WEAR_GLASSES_OR_BLIND: NO
TRANSFERRING: 1-->ASSISTANCE (EQUIPMENT/PERSON) NEEDED
ADLS_ACUITY_SCORE: 39
TRANSFERRING: 1-->ASSISTANCE (EQUIPMENT/PERSON) NEEDED (NOT DEVELOPMENTALLY APPROPRIATE)
DIFFICULTY_EATING/SWALLOWING: NO
ADLS_ACUITY_SCORE: 33
WALKING_OR_CLIMBING_STAIRS: AMBULATION DIFFICULTY, DEPENDENT
ADLS_ACUITY_SCORE: 39
TOILETING_ASSISTANCE: TOILETING DIFFICULTY, ASSISTANCE 1 PERSON
DOING_ERRANDS_INDEPENDENTLY_DIFFICULTY: NO
DRESS: 1-->ASSISTANCE (EQUIPMENT/PERSON) NEEDED
NUMBER_OF_TIMES_PATIENT_HAS_FALLEN_WITHIN_LAST_SIX_MONTHS: 2
CONCENTRATING,_REMEMBERING_OR_MAKING_DECISIONS_DIFFICULTY: OTHER (SEE COMMENTS)
TOILETING: 1-->ASSISTANCE (EQUIPMENT/PERSON) NEEDED

## 2023-08-24 NOTE — PROGRESS NOTES
CLINICAL NUTRITION SERVICES - REASSESSMENT NOTE     Nutrition Prescription    RECOMMENDATIONS FOR MDs/PROVIDERS TO ORDER:  Encourage oral intake    Malnutrition Status:    Moderate malnutrition in the context of chronic illness    Recommendations already ordered by Registered Dietitian (RD):  Continue ensure clear     Future/Additional Recommendations:  Continue to monitor appetite, oral intake and need for additional nutrition education   Monitor need for additional snacks/supplements      EVALUATION OF THE PROGRESS TOWARD GOALS   Diet: Regular + ensure clear @ 2:00 daily  Intake: %       NEW FINDINGS  Theo and parents were in room. Reported appetite was fair, thought he was eating between 50-75%. He reported he is having issues with taste changes     Weight Trends:   Date/Time Weight Weight Method   08/24/23 0750 74.8 kg (164 lb 12.8 oz) Standing scale   08/23/23 0700 74.8 kg (164 lb 14.4 oz) Standing scale   08/22/23 0822 76.9 kg (169 lb 9.6 oz) Standing scale   08/19/23 1212 73.5 kg (162 lb) Standing scale   08/17/23 0752 76.4 kg (168 lb 8 oz) --   08/16/23 0713 74.7 kg (164 lb 11.2 oz) Standing scale   08/15/23 2101 76.4 kg (168 lb 6.9 oz) Bed scale   08/11/23 1216 79.4 kg (175 lb) --   5.7% weight loss in less than one month     Will adjust dosing weight of 58.9 kg   ASSESSED NUTRITION NEEDS  Estimated Energy Needs: 1770 - 2060 kcals/day (30- 35 kcals/kg)  Justification: Maintenance  Estimated Protein Needs: 70 - 99+ grams protein/day (1.2 - 1.5 grams of pro/kg)  Justification: Increased needs  Estimated Fluid Needs: 170 -2060 mL/day (1 mL/kcal)  Justification: Maintenance          GI:  No nausea noted.Last bowel movement, 8/23, constipation documented.   Skin: Emile 18, no rash noted. No wounds    Labs:   Urea Nitrogen 22 (H), Alk Phos 299 (H),  (H)  Glucose POCT: 116 (H)    Medications:   Emend (8/19)   Protonix  Miralax  Psyllium  Senokot    MALNUTRITION  % Intake: < 75% for > 7 days  (moderate)  % Weight Loss: > 5% in 1 month (severe)  Subcutaneous Fat Loss: None observed  Muscle Loss: Temporal:  mild   Fluid Accumulation/Edema: Does not meet criteria  Malnutrition Diagnosis: Moderate malnutrition in the context of acute illness     Previous Goals   Patient to consume % of nutritionally adequate meal trays TID, or the equivalent with supplements/snacks   Evaluation: Not met    Previous Nutrition Diagnosis  Inadequate oral intake related to intermittent NPO and mildly decreased appetite as evidenced by pt report and record of pt ordering 1-2 meals a day.     Evaluation: Modified     CURRENT NUTRITION DIAGNOSIS  Inadequate oral intake related to decreased appetite 2/2 taste changes as evidenced by patient report and intakes and further weight loss    INTERVENTIONS  Implementation  Nutrition education- FASS/Coping with taste changes  Medical food supplement - continue     Goals  Patient to consume % of nutritionally adequate meal trays TID, or the equivalent with supplements/snacks.    Weight to maintain >73.5 kg (lowest admission weight)     Monitoring/Evaluation  Progress toward goals will be monitored and evaluated per protocol.    Sandy Mayer RD, RIVERA  5C/BMT pager: [phone]

## 2023-08-24 NOTE — PROGRESS NOTES
Brief entry:  VAHE received a voice mail from Admissions (Arielle) at [facility] stating that pt needs to be switched from Neulasta to Neupogen or Nivestym while at [facility] .  VAHE spoke with Dr. Anaya who indicates that it is likely that pt will not discharge on Neulasta of Neupogen but if one is needed, she will switch it to Neupogen.      BELLE Tovar  Social Work, 6A  Phone:  [phone]  Pager:  [phone]  8/24/2023

## 2023-08-24 NOTE — PROGRESS NOTES
Care Management Follow Up       Length of Stay (days): 14     Expected Discharge Date: 08/23/2023     Concerns to be Addressed:     discharge planning  Patient plan of care discussed at interdisciplinary rounds: Yes     Anticipated Discharge Disposition:  TCU     Anticipated Discharge Services:  Therapy Services  Anticipated Discharge DME:  N/A     Patient/family educated on Medicare website which has current facility and service quality ratings:   Yes  Education Provided on the Discharge Plan:  Yes  Patient/Family in Agreement with the Plan:   Yes     Referrals Placed by CM/SW:       [facility]  [address] [place] [facility] [address]  P: [phone], (ADDENDUM) F: [phone]  8/23: Initial referral sent via hard fax   8/24: CHW was given the wrong fax number by the  yesterday, so Admissions gave the correct number to CHW so that they can receive the fax and review the referral. CHW sent the new fax and will be waiting for a call back     [facility]  [address] [place] [facility] [address]  P: [phone], F: [phone]  8/23: Initial referral sent via hard fax   8/24: Admissions staff all out for training the rest of the day. CHW left a voicemail asking for updates upon their return.     [facility] [address] [place] [facility] [address]  P: [phone], F: [phone]  8/23: Initial referral sent via hard fax   8/24: Admissions did not answer, so CHW left a voicemail asking for referral status and call back.     [facility]  [address] [facility] [address]  P: [phone], F: [phone]  8/23: Initial referral sent via hard fax   8/24: Admissions did not answer, so CHW left a voicemail asking for referral status and call back.     [facility]  [address] [facility] [address]  P: [phone], F: [phone]  8/23: Initial referral sent via hard fax   8/24: Admissions did not answer, so CHW left a voicemail asking for referral status and call  back.    [facility]  [address] [place] [address] [place] [address]  P: [phone], F: [phone]  8/23: Initial referral sent via hard fax   8/24: Admissions staff are all out for training the rest of the day and tomorrow, per . CHW left a voicemail asking for updates upon their return.     Private pay costs discussed: Not applicable     Additional Information:  CHW updated the the referral list post follow-up calls. CHW will continue to follow up with the facilities as well as continue to update the pt and his family through out the day.

## 2023-08-24 NOTE — DISCHARGE SUMMARY
"[facility]  Hospitalist Discharge Summary      Date of Admission:  8/10/2023  Date of Discharge:  8/24/2023  Discharging Provider: Doretha Anaya DO  Discharge Service: Hospitalist Service, GOLD TEAM 7    Discharge Diagnoses   Acute pathologic left femoral neck and acetabulum fracture s/p repair   High grade Chondroblastic Osteosarcoma  Pulmonary lesions, suspected mets, confirmed to be metastatic osteosarcoma     Clinically Significant Risk Factors     # Overweight: Estimated body mass index is 29.57 kg/m  as calculated from the following:    Height as of this encounter: 1.59 m (5' 2.6\").    Weight as of this encounter: 74.8 kg (164 lb 12.8 oz).  # Moderate Malnutrition: based on nutrition assessment      Follow-ups Needed After Discharge   Follow-up Appointments     Follow Up and recommended labs and tests      Follow up with oncology and orthopedic surgery as instructed            Unresulted Labs Ordered in the Past 30 Days of this Admission       No orders found from 7/11/2023 to 8/11/2023.        These results will be followed up by NA    Discharge Disposition   Discharged to short-term care facility  Condition at discharge: Stable    Hospital Course   Sarbjit Swain is a 43M with hx of BRAC1 mutation, Greene's esophagus, BPH, medulloblastoma s/p  shunt, rectal cancer diagnosed 2021 s/p hemicolectomy and capecitabine partial therapy (discontinued due to an chest tightness reaction) and new osteosarcoma of the left ilium who was initially admitted on 8/10 after a pathologic left femoral neck and acetabulum fracture and underwent repair with ortho on 8/14. Transferred to [place] 8/16 on medicine team for ongoing management and initiation of chemotherapy which was started on 8/19, overall tolerated well. Discharged to TCU.     Acute pathologic left femoral neck and acetabulum fracture  High grade Chondroblastic Osteosarcoma  Recently diagnosed osteosarcoma and " PET CT concerning for metastatic disease to lungs. Pathologic fracture on 8/9, so transferred from [place] for closed reduction with percutaneous pinning of the L hip (8/14/23 w/ Dr. Avila). TTWB for transfers.   - Onc consulted, appreciate recs              - Started doxorubicin/cisplatin on 8/19, completed 8/22; next cycle in 35 days as outpatient    -Neulasta per oncology   - PM&R consulted, appreciate recs              - Recommend TCU, appreciate SW assistance   - PICC line placed, port to be placed outpatient. PICC left in place at time of discharge   - Fentanyl patch, oxycodone and Tylenol for pain  - Health Psychology consulted     Pulmonary lesions, suspected mets, confirmed to be metastatic osteosarcoma   Recent PET showed bilateral lesions suspected to be mets though with his cancer hx it is not entirely clear from which primary.   - S/p bronch w/ biopsy on 8/17 - results with metastatic osteosarcoma      Delirium ( Visual and Auditory Hallucinations)- resolved  Acute Encephalopathy -- resolved  Previously having visual and audio hallucinations this hospitalization. Also having baseline confusion. Imaging showed multiple dural-based masses with vasogenic edema that are unchanged from prior imaging, meningiomas per oncology note. Most likely delirium.  - Delirium precuations  - Pain control  - Continue trazodone 25 mg at bedtime   - Continue PT/OT     Sinus tachycardia  Unclear etiology but has been stable overall.  - Continue routine vitals    Episode of Hypotension, suspect vasovagal   Episode of hypotension 8/22 AM while patient was wearing N95 during PICC dressing change. He reports he is claustrophobic. Resolved without intervention. Likely vasovagal.      Hyponatremia-  improved  Most likely SIADH vs malignancy related.  - Daily BMP     Acute on chronic cancer related pain  -Continue fentanyl patch, oxycodone PRN and Tylenol PRN     Hx of Multiple Cancers:         Stage II colorectal cancer s/p  hemicolectomy now s/p re-anastomosis    Follows with Dr. Palma at [facility], last seen 8/7. Dx in 7/2021.  Underwent hemicolectomy 9/2021 followed by re-anastomosis in 12/2021.  ---   Follow up outpatient         Hx of medulloblastoma s/p  shunt   Diagnosed  At age 4. Underwent craniotomy with resection follow by radiation and chemotherapy.  Has stable right sided brain masses c/w meningiomas being followed by Neurosurgery.   --- will do a CT scan head today.        Hx of Multiple Dural based extraaxial masses ( most lilkely meningioma) and vasogenic edema        BRCA1 positive   Being followed by Oncology for hx of cancer.         Pulm lesions with concern for mets  As above       Chondroblastic Osteosarcoma  As above     Hx of hypothyroidism  Noted in chart. Per review, no abnormal TSH/T4 to fit with diagnosis. Not on pta medication    Consultations This Hospital Stay   OCCUPATIONAL THERAPY ADULT IP CONSULT  PHYSICAL THERAPY ADULT IP CONSULT  INTERNAL MEDICINE ADULT IP CONSULT FOR [facility]  ONCOLOGY ADULT IP CONSULT  INTERVENTIONAL PULMONARY ADULT IP CONSULT  PHYSICAL MEDICINE & REHAB ASSESSMENT FOR REHAB PLACEMENT ADULT IP CONSULT  NURSING TO CONSULT FOR VASCULAR ACCESS CARE IP CONSULT  INTERVENTIONAL RADIOLOGY ADULT/PEDS IP CONSULT  VASCULAR ACCESS FOR PICC PLACEMENT ADULT IP CONSULT  PSYCHIATRY IP CONSULT  PSYCHOLOGY ADULT IP CONSULT  CARE MANAGEMENT / SOCIAL WORK IP CONSULT  PHYSICAL THERAPY ADULT IP CONSULT  OCCUPATIONAL THERAPY ADULT IP CONSULT    Code Status   Full Code    Time Spent on this Encounter   I, Doretha Anaya DO, personally saw the patient today and spent greater than 30 minutes discharging this patient.       DO JONNY Arita [facility] [address] [address]  Phone: [phone]  Fax: [phone]  ______________________________________________________________________    Physical Exam   Vital Signs: Temp: 97.9  F  (36.6  C) Temp src: Axillary BP: 106/70 Pulse: 115   Resp: 18 SpO2: 96 % O2 Device: None (Room air)    Weight: 164 lbs 12.81 oz  Constitutional: no apparent distress, pleasant and cooperative   Cardiovascular: regular rate and rhythm, normal S1 and S2,   Respiratory: clear to auscultation bilaterally, no wheezing, rales, or rhonchi, normal work of breathing   GI: abdomen soft, non tender, non distended, bowel sounds present   Neuro: alert and oriented, no gross focal deficits noted        Primary Care Physician   Physician No Ref-Primary    Discharge Orders      General info for SNF    Length of Stay Estimate: Short Term Care: Estimated # of Days <30  Condition at Discharge: Improving  Level of care:skilled   Rehabilitation Potential: Good  Admission H&P remains valid and up-to-date: Yes  Recent Chemotherapy: Date:                     8/19-8/23  Use Nursing Home Standing Orders: Yes     Mantoux instructions    Give two-step Mantoux (PPD) Per Facility Policy Yes     Reason for your hospital stay    You were hospitalized for a hip fracture     Weight bearing status    Up in Chair   Elevate head of bed 20-30 degrees for meals  Okay for TTWB LLE for bed to chair transfers     Activity - Up with nursing assistance    TTWB LLE     Follow Up and recommended labs and tests    Follow up with oncology and orthopedic surgery as instructed     Physical Therapy Adult Consult    Evaluate and treat as clinically indicated.    Reason:  left hip fracture s/p surgical repair     Occupational Therapy Adult Consult    Evaluate and treat as clinically indicated.    Reason:  left hip fracture s/p repair     Diet    Follow this diet upon discharge: Orders Placed This Encounter      Snacks/Supplements Adult: Ensure Clear; Between Meals      Advance Diet as Tolerated: Regular Diet Adult     Infusion Appointment Request       Significant Results and Procedures   Results for orders placed or performed during the hospital encounter of 08/10/23    XR Hip Left 2-3 Views    Narrative    EXAM: XR HIP LEFT 2-3 VIEWS  LOCATION: [facility]  DATE: 8/11/2023    INDICATION: Fall, femoral neck fracture on CT scan.  COMPARISON: CT pelvis 08/09/2023 and MRI left hip 06/30/2023.      Impression    IMPRESSION: Redemonstration of a mildly displaced left transcervical femoral neck fracture with proximal displacement of the distal femur. Ill-defined lucency involving the medial acetabular wall and the acetabular roof with obliteration of the   iliopectineal line, the pubic root, and the superior pubic ramus. This corresponds with an expansile lytic soft tissue process within the acetabulum and pubis on the prior CT, and the confluent T1 hypointense marrow replacing process demonstrated on the   prior MRI of 06/30/2023.     Mild degenerative arthritis both hips. Radiopaque suture material overlies the low pelvis.   XR Surgery YARELI L/T 5 Min Fluoro    Narrative    This exam was marked as non-reportable because it will not be read by a   radiologist or a [place] non-radiologist provider.         MR Pelvis Bone wo & w Contrast    Narrative    EXAM: MRI PELVIS WITHOUT AND WITH IV CONTRAST  LOCATION: [facility]  DATE: 8/15/2023    INDICATION: Chondrosarcoma involving pelvis and has rapidly expanded into left hip; evaluate extent of spread   COMPARISON: CT dated 08/09/2023..    TECHNIQUE: Routine MRI pelvis without and with IV contrast. Axial, sagittal, and coronal high-resolution T2 and post gadolinium T1 with fat saturation.   CONTRAST: 7.94 ml Gadavist    FINDINGS:     -There is a fracture of the left femoral neck status post fixation with multiple cannulated screws, susceptibility artifact from which degrades evaluation of adjacent structures.    There is a large osseous lesion centered in the left acetabulum extending into the superior pubic ramus and into the left iliac wing, likely  representing the reported biopsy-proven sarcoma. There is an associated soft tissue component to this lesion   which demonstrates cortical breakthrough along the acetabulum and superior pubic ramus extending into the adjacent iliopsoas, gluteus minimus, and adductor musculature.     There is additional soft tissue edema and enhancement surrounding the proximal left femur fracture extending into the adjacent quadriceps and gluteus prince muscle, and it is difficult to determine to what degree this represents postoperative change   versus additional tumor.    There is a 2.8 x 2.3 cm soft tissue mass between the rectus femoris and iliacus, just lateral to the femoral artery, likely a metastatic inguinal lymph node. There is an additional bulky metastatic left external iliac lymph node.    Left femoral neck is not well evaluated on this study due to artifact from the fixation screws but given the appearance on the CT from 08/09/2023, there is likely additional tumor within the proximal femoral head/neck.    Sacroiliac joints are intact. No pelvic free fluid is seen.      Impression    IMPRESSION:  1.  Large osseous lesion centered in the left acetabulum extending into the superior pubic ramus and into the left iliac wing, suggesting an aggressive neoplasm, likely the reported sarcoma. There is an associated soft tissue component to this lesion   which demonstrates cortical breakthrough through the acetabulum and superior pubic ramus into the adjacent iliopsoas, adductor, and gluteus minimus musculature. There are bulky inguinal and external iliac alec metastases.  2.  Status post cannulated screw fixation of a fracture of the left femoral neck, susceptibility artifact from which degrades evaluation of adjacent structures. The femoral neck is not well evaluated due to susceptibility artifact but there is suspected   tumor within the proximal femoral head and neck given the lytic  appearance on the recent CT.   3.   There is additional soft tissue edema and enhancement surrounding proximal left femur extending into the adjacent quadriceps and gluteal musculature, and it is difficult to determine to what degree this represents postoperative change versus   additional tumor involvement.   CT Head w/o Contrast    Narrative    CT HEAD W/O CONTRAST 8/16/2023 12:00 PM    History: Hx of medulloblastoma a/p  shunt with continued confusion  and new hallucinations ( visual and auditory )     Comparison:  Outside MRI 4/17/2023      Technique: Using multidetector thin collimation helical acquisition  technique, axial, coronal and sagittal CT images from the skull base  to the vertex were obtained without intravenous contrast.   (topogram) image(s) also obtained and reviewed.    Findings:   Right frontal approach shunt catheter terminating in the vicinity of  foramen of Monro. Stable size of shunted ventricles.  Limited evaluation of multifocal dural based extra-axial masses  largest of which localized along the right frontal lobe. Continued  vasogenic edema associated with the right parieto-occipital dural  based mass, grossly unchanged from prior MRI.   Scattered numerous cavernomas better depicted on prior MRI; some are  visible on current study.  Status post suboccipital craniotomy. Diffuse cerebellar atrophy also  involving cerebellar peduncles; left greater than right. Dystrophic  calcifications along cerebellar folia, likely related to prior  radiotherapy.   Atherosclerotic calcifications within the carotid siphons and  vertebral arteries.  There is no intracranial hemorrhage or midline shift. Ventricles are  proportionate to the cerebral sulci. The basal cisterns are clear.  Heterogenous density of bony structures predominantly seen along the  calvarium; indeterminate. The mastoid air cells are clear. Polypoid  thickening versus retention cyst within right maxillary sinus.      Impression    Impression:  1. No acute  hemorrhage. No midline shift.  2. Stable size of shunted ventricles.  3. Limited evaluation of multifocal dural based extra-axial masses.  Continued vasogenic edema associated with the right parieto-occipital  dural based mass.     I have personally reviewed the examination and initial interpretation  and I agree with the findings.    ELEUTERIO CAT MD         SYSTEM ID:  [id number]   CT Chest w/o Contrast    Narrative    Exam: CT Chest without contrast 8/16/2023 3:11 PM     History: For ION bronchoscopy, ok for day prior the procedure    Comparison: Outside PET/CT 7/21/2023, outside CT 1/30/2023    Technique: Helical acquisition of CT images from the lung apices to  the kidneys without IV contrast. Coronal images and axial MIP images  were reconstructed from the source data.    Findings:   Mediastinum: Probable ill-defined hypoattenuating left thyroid nodule  (2/50) without FDG avidity on comparison PET CT. Heart size is within  normal limits. No pericardial effusion. The great vessels are within  normal limits. Trace extra-axial calcification of the aortic arch. No  mediastinal, hilar, or axillary lymphadenopathy.    Lungs: The trachea and central airways are patent. Multiple new and  enlarging bilateral solid pulmonary nodules and mass, with the largest  measuring 3.7 x 3.5 cm in the right lower lobe (4/288) and 2.4 x 2.1  cm in the posterior right upper lobe (4/139). No pneumothorax or  pleural effusion. Calcified granulomas.    Upper Abdomen: Limited evaluation of the upper abdomen demonstrates  bilateral renal cysts, the right hepatic lobe cyst, and splenules.    Bones and soft tissues: No acute or suspicious osseous abnormality.  Degenerative changes of the visualized spine. Unchanged L1 compression  fracture deformity. Partially visualized ventriculoperitoneal shunt  extending from the right lower neck and right anterior chest into the  peritoneum.      Impression    Impression:?Numerous new and enlarging  metastatic bilateral pulmonary  nodules and masses compared to 7/21/2023.    I have personally reviewed the examination and initial interpretation  and I agree with the findings.    SIL CLAUDIO DO         SYSTEM ID:  [id number]   XR Chest Port 1 View    Narrative    EXAM: XR Chest 1 view 8/17/2023 11:07 AM      HISTORY: lung biopsy.    COMPARISON: CT chest 8/16/2023.     TECHNIQUE: Frontal view of the chest.    FINDINGS:   Partially visualized ventricular peritoneal catheter.  Trachea is midline. Cardiomediastinal silhouette is within normal  limits. Multiple bilateral pulmonary nodules, stable. No appreciable  pneumothorax. No pleural effusion. Nonobstructive bowel gas pattern.      Impression    IMPRESSION:   1. No appreciable pneumothorax.  2. Stable pulmonary nodules.    I have personally reviewed the examination and initial interpretation  and I agree with the findings.    RADHIKA MERRITT MD         SYSTEM ID:  [id number]   MR Brain w/o & w Contrast    Narrative    EXAM: MR BRAIN W/O & W CONTRAST  8/18/2023 10:32 PM     HISTORY: r/o brain mets, hx of high grade chondroblastic osteosarcoma,  known mets.       COMPARISON: CT 8/16/2023; outside brain MRI 4/17/2023    TECHNIQUE: Sagittal and axial T1-weighted, axial diffusion,  multiplanar T2 FLAIR with fat saturation images were obtained without  intravenous contrast. Following intravenous gadolinium-based contrast  administration, axial T2-weighted, axial susceptibility, and  T1-weighted images (in multiple planes) were obtained.    CONTRAST: 7.5 mls Gadavist.    FINDINGS:  No significant change in multiple enhancing dural based extra-axial  masses with the largest measuring 4.6 x 3.7 cm along the right frontal  convexity with associated mass effect. Similar appearing vasogenic  edema associated with the right parieto-occipital dural based mass.    Scattered numerous cavernomas with associated susceptibility changes.  No intracranial hemorrhage. No acute infarct.  Preserved intravascular  flow voids. Right frontal approach shunt catheter catheter terminates  near the foramen of Monro with stable ventricular size.    Suboccipital craniotomy. Mucous retention cysts in the maxillary  sinuses. Clear mastoid air cells. Nonfocal pituitary gland, sella,  skull base and upper cervical spinal structures. The orbits are  normal.      Impression    IMPRESSION:  1. Similar-appearing multiple dural based extra-axial masses with  continued vasogenic edema associated with the right parieto-occipital  dural based mass.  2. No new suspicious lesion since outside MRI dated 2023.  3. Stable shunted ventricular size.    I have personally reviewed the examination and initial interpretation  and I agree with the findings.    MATHEUS QUINTERO MD         SYSTEM ID:  [id number]   Echo Complete     Value    Biplane LVEF 64%    Narrative    [id number]  [facility]  [id number]  [id number]^COLT^RACQUEL     [facility],[place]  Echocardiography Laboratory  [address] [place] [id number]     Name: FIONA LEAL  MRN: [id number]  : 1980  Study Date: 2023 03:41 PM  Age: 43 yrs  Gender: Male  Patient Location: [facility]  Reason For Study: Chemotherapy  Ordering Physician: RACQUEL GREGORY  Performed By: Madie Lara RDCS     BSA: 1.8 m2  Height: 63 in  Weight: 164 lb  HR: 110  BP: 141/89 mmHg  ______________________________________________________________________________  Procedure  Complete Portable Echo Adult. Contrast Optison. Technically difficult  study.Extremely poor acoustic windows. Optison (NDC #3268-4096-28) given  intravenously. Patient was given 5 ml mixture of 3 ml Optison and 6 ml saline.  4 ml wasted.  ______________________________________________________________________________  Interpretation Summary  Technically difficult study.Extremely poor acoustic windows.  Biplane LVEF is 64%.  Right ventricular function, chamber size, wall motion, and thickness  are  normal.  The inferior vena cava is normal.  No pericardial effusion is present.  There is no prior study for direct comparison.  ______________________________________________________________________________  Left Ventricle  Biplane LVEF is 64%. Left ventricular wall thickness is normal. Left  ventricular size is normal. Diastolic function not assessed due to  tachycardia. No regional wall motion abnormalities are seen.     Right Ventricle  Right ventricular function, chamber size, wall motion, and thickness are  normal.     Atria  Both atria appear normal.     Mitral Valve  The mitral valve is normal.     Aortic Valve  The valve leaflets are not well visualized. On Doppler interrogation, there is  no significant stenosis or regurgitation.     Tricuspid Valve  The tricuspid valve is normal. Pulmonary artery systolic pressure cannot be  assessed.     Pulmonic Valve  The valve leaflets are not well visualized. On Doppler interrogation, there is  no significant stenosis or regurgitation.     Vessels  The thoracic aorta cannot be assessed. The pulmonary artery and bifurcation  cannot be assessed. The inferior vena cava is normal.     Pericardium  No pericardial effusion is present.     Compared to Previous Study  There is no prior study for direct comparison.  ______________________________________________________________________________  MMode/2D Measurements & Calculations     LVOT diam: 2.0 cm  LVOT area: 3.1 cm2     EF(MOD-bp): 63.9 %  TAPSE: 1.6 cm     ______________________________________________________________________________  Report approved by: Anahi Sandoval 08/16/2023 04:15 PM             Discharge Medications   Current Discharge Medication List        START taking these medications    Details   !! hydrOXYzine (ATARAX) 25 MG tablet Take 1 tablet (25 mg) by mouth every 6 hours as needed for other or anxiety (adjuvant pain)    Associated Diagnoses: Osteosarcoma of tibia, unspecified laterality (H)       !! hydrOXYzine (ATARAX) 50 MG tablet Take 1 tablet (50 mg) by mouth every 6 hours as needed for other or anxiety (adjuvant pain)    Associated Diagnoses: Osteosarcoma of tibia, unspecified laterality (H)      methocarbamol (ROBAXIN) 750 MG tablet Take 1 tablet (750 mg) by mouth every 6 hours as needed for muscle spasms    Associated Diagnoses: Osteosarcoma of tibia, unspecified laterality (H)      oxyCODONE IR (ROXICODONE) 5 MG tablet Take 2-3 tablets (10-15 mg) by mouth every 4 hours as needed for moderate to severe pain  Qty: 12 tablet, Refills: 0    Associated Diagnoses: Osteosarcoma of tibia, unspecified laterality (H); Left displaced femoral neck fracture (H)      pantoprazole (PROTONIX) 40 MG EC tablet Take 1 tablet (40 mg) by mouth daily    Associated Diagnoses: Gastroesophageal reflux disease without esophagitis      polyethylene glycol (MIRALAX) 17 g packet Take 17 g by mouth daily    Associated Diagnoses: Osteosarcoma of tibia, unspecified laterality (H)      psyllium (METAMUCIL) WAFR Take 2 Wafers by mouth daily    Associated Diagnoses: Osteosarcoma of tibia, unspecified laterality (H)      senna-docusate (SENOKOT-S/PERICOLACE) 8.6-50 MG tablet Take 1 tablet by mouth 2 times daily    Associated Diagnoses: Osteosarcoma of tibia, unspecified laterality (H)      traZODone (DESYREL) 50 MG tablet Take 0.5 tablets (25 mg) by mouth At Bedtime    Associated Diagnoses: Primary insomnia       !! - Potential duplicate medications found. Please discuss with provider.        CONTINUE these medications which have CHANGED    Details   acetaminophen (TYLENOL) 325 MG tablet Take 2 tablets (650 mg) by mouth every 4 hours as needed for other (For optimal non-opioid multimodal pain management to improve pain control.)    Associated Diagnoses: Osteosarcoma of tibia, unspecified laterality (H); Left displaced femoral neck fracture (H)      fentaNYL (DURAGESIC) 50 mcg/hr 72 hr patch Place 1 patch onto the skin every 72 hours  remove old patch.  Qty: 1 patch, Refills: 0    Associated Diagnoses: Osteosarcoma of tibia, unspecified laterality (H)           CONTINUE these medications which have NOT CHANGED    Details   loratadine (CLARITIN) 10 MG tablet Take 10 mg by mouth daily as needed for allergies           STOP taking these medications       dexAMETHasone (DECADRON) 4 MG tablet Comments:   Reason for Stopping:         omeprazole (PRILOSEC) 20 MG DR capsule Comments:   Reason for Stopping:         oxyCODONE-acetaminophen (PERCOCET)  MG per tablet Comments:   Reason for Stopping:             Allergies   Allergies   Allergen Reactions    Capecitabine Other (See Comments)     Severe chest pain, lasted 5 days of taking [facility]    Seasonal Allergies Itching

## 2023-08-24 NOTE — PROGRESS NOTES
Care Management Discharge Note    Discharge Date: 08/24/2023       Discharge Disposition:  [facility] ([phone])    Discharge Services:  Short term TCU placement at [facility]    Discharge DME:   Not applicable at this time    Discharge Transportation:  [facility] ([place] [phone]) to provide w/c transport with a service window  time of 4pm - 4:30pm.    Private pay costs discussed: Not applicable    Does the patient's insurance plan have a 3 day qualifying hospital stay waiver?  No    PAS Confirmation Code:  [id number]  Patient/family educated on Medicare website which has current facility and service quality ratings:  yes    Education Provided on the Discharge Plan: yes   Persons Notified of Discharge Plans:    Pt, wife, pt's parent's, 5C nursing and Dr. Anaya  Patient/Family in Agreement with the Plan:  Pt and wife are reluctantly agreeable to the discharge plan    Handoff Referral Completed:    no, as no PCP is listed on the Admissions Face sheet     Additional Information:  - Dr. Anaya confirmed readiness for discharge  - Admissions (Arielle) at [facility] confirmed acceptance for admit and receipt of prior auth from insurance.      BELLE Tovar  Social Work, 6A  Phone:  [phone]  Pager:  [phone]  8/24/2023       GORGE Araujo

## 2023-08-24 NOTE — PLAN OF CARE
"/75 (BP Location: Left arm)   Pulse 110   Temp 98.1  F (36.7  C) (Axillary)   Resp 18   Ht 1.59 m (5' 2.6\")   Wt 74.8 kg (164 lb 14.4 oz)   SpO2 95%   BMI 29.59 kg/m       Afebrile. Intermittently tachy, 100's-110's. OVSS on RA. A&Ox4, bed alarm remains on for safety, calling appropriately. L leg pain rated 3-5/10, PRN oxy given x2, PRN atarax given x1, & PRN robaxin given x1. Denies N/V. Drinking fluids well. Voiding well in bedside urinal. No BM's this shift. No orders for electrolyte replacements, stable. CBC stable. Continue w plan of care.     Problem: Plan of Care - These are the overarching goals to be used throughout the patient stay.    Goal: Absence of Hospital-Acquired Illness or Injury  Outcome: Progressing  Intervention: Identify and Manage Fall Risk  Recent Flowsheet Documentation  Taken 8/24/2023 0400 by Anay Shaffer, RN  Safety Promotion/Fall Prevention:   activity supervised   assistive device/personal items within reach   chemotherapeutic precautions   clutter free environment maintained   lighting adjusted   nonskid shoes/slippers when out of bed   patient and family education   room organization consistent   safety round/check completed   supervised activity  Taken 8/24/2023 0000 by Anay Shaffer, RN  Safety Promotion/Fall Prevention:   activity supervised   assistive device/personal items within reach   chemotherapeutic precautions   clutter free environment maintained   lighting adjusted   nonskid shoes/slippers when out of bed   patient and family education   room organization consistent   safety round/check completed   supervised activity  Taken 8/23/2023 2000 by Anay Shaffer, RN  Safety Promotion/Fall Prevention:   activity supervised   assistive device/personal items within reach   chemotherapeutic precautions   clutter free environment maintained   lighting adjusted   nonskid shoes/slippers when out of bed   patient and family education   room " organization consistent   safety round/check completed   supervised activity  Intervention: Prevent Skin Injury  Recent Flowsheet Documentation  Taken 8/23/2023 2000 by Anay Shaffer RN  Body Position: position changed independently  Intervention: Prevent Infection  Recent Flowsheet Documentation  Taken 8/24/2023 0400 by Anay Shaffer RN  Infection Prevention:   cohorting utilized   environmental surveillance performed   equipment surfaces disinfected   hand hygiene promoted   personal protective equipment utilized   rest/sleep promoted   single patient room provided   visitors restricted/screened  Taken 8/24/2023 0000 by Anay Shaffer RN  Infection Prevention:   cohorting utilized   environmental surveillance performed   equipment surfaces disinfected   hand hygiene promoted   personal protective equipment utilized   rest/sleep promoted   single patient room provided   visitors restricted/screened  Taken 8/23/2023 2000 by Anay Shaffer RN  Infection Prevention:   cohorting utilized   environmental surveillance performed   equipment surfaces disinfected   hand hygiene promoted   personal protective equipment utilized   rest/sleep promoted   single patient room provided   visitors restricted/screened  Goal: Optimal Comfort and Wellbeing  Outcome: Progressing  Intervention: Monitor Pain and Promote Comfort  Recent Flowsheet Documentation  Taken 8/24/2023 0400 by Anay Shaffer RN  Pain Management Interventions: medication (see MAR)  Taken 8/23/2023 2329 by Anay Shaffer RN  Pain Management Interventions: declines  Taken 8/23/2023 2145 by Anay Shaffer RN  Pain Management Interventions: medication (see MAR)  Taken 8/23/2023 2057 by Anay Shaffer RN  Pain Management Interventions: medication (see MAR)  Intervention: Provide Person-Centered Care  Recent Flowsheet Documentation  Taken 8/23/2023 2000 by Anay Shaffer RN  Trust Relationship/Rapport:    care explained   choices provided   questions encouraged   questions answered   reassurance provided   thoughts/feelings acknowledged  Goal: Readiness for Transition of Care  Outcome: Progressing     Problem: Hip Fracture Medical Management  Goal: Optimal Coping with Change in Health Status  Outcome: Progressing  Goal: Absence of Bleeding  Outcome: Progressing  Goal: Effective Bowel Elimination  Outcome: Progressing  Goal: Baseline Cognitive Function Maintained  Outcome: Progressing  Goal: Absence of Embolism  Outcome: Progressing  Goal: Fracture Stability  Outcome: Progressing  Goal: Optimal Functional Performance  Outcome: Progressing  Intervention: Promote Optimal Functional Status  Recent Flowsheet Documentation  Taken 8/23/2023 2000 by Anay Shaffer RN  Activity Management: activity adjusted per tolerance  Goal: Optimal Pain Control and Function  Outcome: Progressing  Intervention: Manage Acute Orthopaedic-Related Pain  Recent Flowsheet Documentation  Taken 8/24/2023 0400 by Anay Shaffer RN  Pain Management Interventions: medication (see MAR)  Taken 8/23/2023 2329 by Anay Shaffer RN  Pain Management Interventions: declines  Taken 8/23/2023 2145 by Anay Shaffer RN  Pain Management Interventions: medication (see MAR)  Taken 8/23/2023 2057 by Anay Shaffer RN  Pain Management Interventions: medication (see MAR)  Goal: Effective Urinary Elimination  Outcome: Progressing  Intervention: Support Effective Urinary Elimination  Recent Flowsheet Documentation  Taken 8/23/2023 2000 by Anay Shaffer RN  Urinary Elimination Promotion: toileting device within reach     Problem: Oral Intake Inadequate  Goal: Improved Oral Intake  Outcome: Progressing   Goal Outcome Evaluation:

## 2023-08-24 NOTE — PROGRESS NOTES
Care Management Follow Up    Length of Stay (days): 14    Expected Discharge Date:   8/24/2023     Concerns to be Addressed:   Discharge planning    Patient plan of care discussed at interdisciplinary rounds: Yes    Anticipated Discharge Disposition:  Short term rehab placement at [facility]     Anticipated Discharge Services:  Short term rehab placement at [facility]  Anticipated Discharge DME:    Not applicable at this time    Patient/family educated on Medicare website which has current facility and service quality ratings:  yes  Education Provided on the Discharge Plan:  yes  Patient/Family in Agreement with the Plan:  yes    Referrals Placed by CM/SW:  Post acute care facility's  Private pay costs discussed: Not applicable    Additional Information:  SW is following pt for discharge planning.   A TCU stay is recommended and pt medically ready for discharge as of 8/23/2023.   Pt was reportedly accepted for admit to  TCU yesterday however, bed was declined as pt/wife wanted pt placed closer to home.  Pt completed a course of chemo during this hospitalization.  It is anticipated that pt will begin another course of chemo sometime in September of 2023.     Referrals were made  to TCU['s in [place] by the Community Health Worker and at this time, there is not an accepting facility.   [facility] met with pt (parents present) and discussed discharge planning.  SW explained that it is likely that a [place] TCU will not accept due to upcoming chemo.  It is likely that continued pursuit of [place] TCU's will only result in continued delay in placement as it is unlikely that if a [place] TCU accepts that they would accept pt before next week.  As this is the case, it will only delay the start of rehab which will delay completion date of rehab which will delay date of return to home.  Pt voiced understanding.  Pt states that he wants to do what is necessary to get to rehab as soon as possible so that he can complete his rehab stay and  return to home as soon as possible.  Pt requested that SW spoke with his wife.  SW phoned pt's wife (Shagufta) and relayed the above information.  Shagufta voices frustration stating that the were told that pt was accepted to  TCU before they were offered choice.  Shagufta states that when they declined [facility] (on 8/23/2023) they then received a list of facility's per their request for placement in [place].  Shagufta states that she and her 11 year old son want pt placed in [place].  However, Shagufta agree's that she will discuss this with pt who will then communicate their decision to this SW.   Shagufta asked if pt could go to [facility] and then transfer to a TCU in [place].  SW explained that  TCU would not accept with this condition.   SW phoned Dr. Doretha Anaya who confirmed readiness for discharge.  SW spoke with pt's floor nurse who indicates that pt can transport by w/c.  SW spoke with [facility] Admissions (Arielle) who states that they would have a bed available for pt today if pt is agreeable.  Arielle also states that they have already received prior auth from insurance.      SW followed up with pt/parents.  Pt spoke with his wife via phone call.   While wife is unhappy that pt will be placed in [place], pt states that they are now both agreeable to placement at  TCU.      SW will coordinate discharge.        BELLE Tovar  Social Work, 6A  Phone:  [phone]  Pager:  [phone]  8/24/2023       GORGE Araujo

## 2023-08-24 NOTE — PROGRESS NOTES
Solid Tumor Oncology Consult Service  Progress Note   Date of Service: 08/24/2023  Patient: Sarbjit Swain  MRN: [id number]  Admission Date: 8/10/2023  Hospital Day # 14  Cancer Diagnosis: Chondroblastic osteosarcoma  Primary Outpatient Oncologist: Kee Palma at [facility]  Current Treatment Plan: Chemotherapy started cisplatin and doxorubicin on 8/19    Assessment & Plan:   Sarbjit is a 43 year old male with a history of BRCA1 mutation, medulloblastoma at at age 4 s/p  shunt and whole spine radiation, rectal cancer dx July 201 s/p hemicolectomy and CAPOX partial therapy (discontinued due to intolerance) who presents with pathologic left femoral neck and acetabulum fracture with workup revealing 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 He is now s/p internal fixation of his left hip with orthopedic surgery    The patient began experiencing left hip pain around April/May 2023. MRI of left hip on 6/30 revealed infiltrative metastatic lesion in the left acetabulum extending into the superior pubic ramus with associated nondisplaced acetabular fracture. Patient later had a PET on 7/21 that showed an FDG avid mass involving the left pelvis and proximal femur. PET also revealed multiple bilateral FDG avid intrapulmonary and subpleural nodules, favoring metastasis over primary pulmonary malignancy. Additionally, there was nonspecific FDG uptake along the gallbladder fundus and equivocal punctate focus of uptake in the subscapular left hepatic lobe. The patient underwent biopsy on 7/31 at [facility] and the biopsy later resulted as high-grade chondroblastic osteosarcoma. Meanwhile, the patient was transferred to the orthopedic service at [facility] from [facility] on 8/9 with a pathological subcapital fracture of the left femur with acetabular involvement; he is now s/p internal fixation of the hip on 8/14.     He had a bronchoscopy done on 8/17 with biopsy that shows metastatic  osteosarcoma.He proceeded with chemotherapy for chondroblastic osteosarcoma on 8/19 (doxorubicin 75mg/m2 over 3 days and cisplatin 90 mg/m2 on day 1) and tolerated well. Typically would get neulasta but for discharge planning/insurance, we started him on filgastrim on 8/23.  Planning to discharge to TCU today.    New recommendations:   - Daily filgrastrim 5mcg/kg/day for up to 14 days until ANC reaches 10,000mm3  - Please note change of chemotherapy schedule. It will be as follows:    Doxorubicin 75mg/m2 over 3 days and cisplatin 90 mg/m2 on day 1.     21 day cycles. Next cycle due 9/8/23. He can receive this outpatient.     - We discussed following with us outpatient with Dr. Ellington vs following up with their OS oncologist (Kee Palma). He has decided to continue care with Dr. Palma. We will reach out to him.    Bouchra Richmond MD  Hematology/Oncology Fellow

## 2023-08-24 NOTE — PROGRESS NOTES
Pt discharged to:[facility]  Via:[facility]  Time:1730  Reason not before 11am:ride and plan not setup  Accompanied by:[facility]  Belongings:parents took  Teaching:mervin  Clinic appointment:[facility]  Report called/faxed:TCU staff  Local housing:NA

## 2023-08-24 NOTE — PROGRESS NOTES
Physical Therapy Discharge Summary    Reason for therapy discharge:    Discharged to transitional care facility.    Progress towards therapy goal(s). See goals on Care Plan in [facility] electronic health record for goal details.  Goals partially met.  Barriers to achieving goals:   discharge from facility.    Therapy recommendation(s):    Continued therapy is recommended.  Rationale/Recommendations:  Recommend continued therapy services to improve pt's safety with transfers and mobility, increase LE strength and functional abilities. Pt will benefit from continued transfer training within TTWB precautions to improve level of IND.

## 2023-08-24 NOTE — PROGRESS NOTES
Care Management Follow Up    Length of Stay (days): 14    Expected Discharge Date: 08/23/2023     Concerns to be Addressed:     discharge planning  Patient plan of care discussed at interdisciplinary rounds: Yes    Anticipated Discharge Disposition:  TCU     Anticipated Discharge Services:  TCU  Anticipated Discharge DME:  TBD  Patient/family educated on Medicare website which has current facility and service quality ratings:  yes  Education Provided on the Discharge Plan:  yes  Patient/Family in Agreement with the Plan:  yes    Referrals Placed by CM/SW:    Private pay costs discussed: Not applicable and      [facility] - pt' insurance has authorized pt.       Additional Information:  [facility] received a call from [facility], asking if pt is medically ready and if he wanted to come to [facility]. [facility] messaged the MD asking for medical readiness. BROWN Mckee, is calling the referrals she sent to [place] and  will also speak to the family about [facility].   [facility] messaged  MD Anaya, asking if pt is medically ready.     [facility] to follow and assist with any other discharge needs that may arise.   BELLE Fajardo   5B    Phone: [phone]  Pager: [phone]

## 2023-08-24 NOTE — PROGRESS NOTES
Patient is a 43 year old male  admitted to room 411   via wheelchair.  Patient is alert and oriented X 4. See Epic for VS and assessment.  Patient is able to transfer with assistance x 1 using walker. Patient was settled into their room, shown call light, tv, mealtimes etc. Oriented to unit. Will continue monitoring pain level and VS. Notifying MD with any concerns.  Follow MD orders for cares and medications.    Flu Vaccine - do they want the flu vaccine, if applicable? N/A (If flu vaccine is due, pls ask patient if they would be interested in the flu vaccine and follow the prompts to the left of the chart)    COVID Vaccine: _1_ of 5 (Typically, patients should have four COVID vaccines plus the Bivalent booster. Under immunizations, please verify where the patient falls and offer the COVID booster, if applicable. If patient is interested, please add on sticky for provider to order. If declines, please add in comments.) Comments:        Level of Schooling: some college  Ethnicity:  Marital Status:  Dentures: No  Hearing Aid: No  Smoker:  No  Glasses: No  Occupation:     Falls 0-1 mo: 0     2-6 mo: 1  Stairs prior function: Independent  Prior device use: Other      Advanced Care Directive Referral to Social Work? Pt stated that he & wife are working on it.

## 2023-08-24 NOTE — H&P
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 Transferred to TCU on 8/24/23 with deconditioning for continued rehabilitation.     # Deconditioning   - PT/OT consult    # Acute pathologic L femoral neck fracture and acetabulum fracture  # S/p  CRPP of left femoral neck (8/14/23)   S/p CRPP of L femoral neck on 8/14/23 with Dr. Avila. No post operative complications.   - TTWB of LLE for transfers  - DVT ppx: Lovenox subcutaneous daily for 4-6 weeks, plan to reassess at that time with oncology to determine if need further ppx with underlying malignancy and mobility at that time   - Orthopedic surgery recommends repeat AP and Lateral Xray of L hip 2 weeks post op and again 6-8 weeks post op. Follow up with orthopedics TBD until patient completes chemotherapy, and could discuss surgical repair at that time     # Metastatic high grade chondroblastic osteosarcoma  Recently diagnosed osteosarcoma and PET CT concerning for metastatic disease to lungs. Admitted with acute pathologic fracture as noted above s/p repair. With pulmonary lesions, patient underwent bronchoscopy on 8/17 with pathology positive for metastatic osteosarcoma. While admitted oncology consulted and started chemotherapy with Doxorubicin/cisplatin 8/19-8/22 with plans for next cycle in 21 days  in outpatient (next cycle 9/8/23), patient will need to determine if getting it at [facility] (Dr. Ellington) or back on [place] with outpatient oncologist (Dr. Palma). PICC line in place for chemotherapy, needing port in outpatient.   - Daily Filgrastim 5 mcg/kg/day for up to 14 days until ANC > 10,000 mm3 consistently   - Giving another dose today after discussion with oncology   - Trend CMP, and CBC with diff daily until reaches goal, then MWF  - Outpatient follow up with oncology (will need to determine if its will [facility] or with [facility] oncologist)   - Health Psychology consult to follow while at [facility]     # Acute on chronic Cancer related pain   - Continue fentanyl patch 50 mcg/72 hour patch, oxycodone 10-15 mg Q4H PRN, Tylenol 975 mg TID, robaxin 750 mg QID PRN, and atarax 25-50 mg TID PRN     # Sinus tachycardia - Unclear etiology but has been stable overall.  - Continue routine vitals    # Acute anemia - Normocytic anemia. Possible related to surgery and starting chemotherapy. Hgb recent trended and stable at 10-11.0s  - Trend CBC on M,W,F  - Transfuse if hgb goal > 7.0      # Transaminitis - ALT in 100s but down trending. Unclear etiology, possibly 2/2 chemotherapy. Trend MWF   # Elevated alk phos - likely related to osteosarcoma and fracture. Trend M,W,F    # Diarrhea-  Patient notes chronic diarrhea, that has worsened in the last day. No associated F/C, abdominal pain or blood stools. Hx of hemicolectomy. Likely 2/2 recent chemotherapy. Low suspicion for C. Diff at this time.   - PRN lomotil     Stable Medical conditions  # Stage II colorectal cancer s/p hemicolectomy now s/p re-anastomosis - Follows with Dr. Palma at [facility], last seen 8/7. Dx in 7/2021.  Underwent hemicolectomy 9/2021 followed by re-anastomosis in 12/2021    # Hx of Multiple Dural based extraaxial masses   # Hx of medulloblastoma s/p  shunt - Diagnosed at age 4. Underwent craniotomy with resection follow by radiation and chemotherapy.  Has  stable right sided brain masses with vasogenic edema unchanged from prior scans on CT c/w meningiomas being followed by Neurosurgery.          # BRCA1 positive - Being followed by Oncology for hx of cancer.      # Hx of hypothyroidism - Noted in chart. Per review, no abnormal TSH/T4 to fit with diagnosis. Not on pta medication    Resolved Medical Issues:  # Delirium ( Visual and Auditory Hallucinations)- resolved - Previously having visual and audio hallucinations this hospitalization. Improved with delirium precautions, pain control, and trazodone 25 mg at bedtime.   # Hyponatremia- Resovled  - Most likely SIADH vs malignancy related. Normalized. Trend BMP     The above was discuss with patient and and attending physician, Dr. Rebecca Killian who agrees with the above    CODE: FULL CODE  Diet: Regular diet  DVT: Lovenox   Indication for Psychotropic Medications: fentanyl, oxycodone, atarax, robaxin, trazodone at the lowest effective dose for management of pain and sleep  Pneumococcal Vaccination Status:  with recent chemotherapy, recommend delaying until off chemotherapy > 3 months.  Follow up with oncology in outpatient to discuss vaccination at that time   Disposition: D    Tatiana Grissom PA-C  Hospitalist Service  Contact information available via 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 Transferred to TCU on 8/24/23 with deconditioning for continued  rehabilitation.     Patient reports feeling fatigued, malaise, poor appetite and nausea today. Denies any vomiting, or abdominal pain. Denies any F/C, CP, SOB, diarrhea, urinary symptoms. Reports continued pain in his L hip currently at 6/10 in severity. States his pain medications are currently helping to control the pain with fentanyl patch and oxycodone. He does not feel that topical medications have been helpful in the past.  He is anxious to get back to [place].          Review of Systems:   A 10 point ROS was performed and negative unless otherwise noted in HPI.          Past Medical History:   I have reviewed this patient's medical history and updated it with pertinent information if needed.     BRAC1 mutation  Greene's esophagus  BPH  medulloblastoma s/p  shunt  rectal cancer diagnosed 2021 s/p hemicolectomy and capecitabine partial therapy  metastatic osteosarcoma         Past Surgical History:   I have reviewed this patient's surgical history and updated it with pertinent information if needed.  Past Surgical History:   Procedure Laterality Date    ENDOBRONCHIAL ULTRASOUND FLEXIBLE N/A 8/17/2023    Procedure: BRONCHOSCOPY,  WITH ENDOBRONCHIAL ULTRASOUND GUIDANCE;  Surgeon: Tomas London MD;  Location: [facility] OR    HIP PINNING Left 08/14/2023    Procedure: OPEN REDUCTION INTERNAL FIXATION LEFT FEMORAL NECK;  Surgeon: Dann Avila MD;  Location: [facility] OR    PICC DOUBLE LUMEN PLACEMENT Right 08/17/2023    5FR DL PICc, basilic vein. L-38cm, 2cm out.            Social History:     Social History     Tobacco Use    Smoking status: Never   Substance Use Topics    Alcohol use: Not Currently     . Lives in [place].          Family History:   I have reviewed this patient's family history and updated it with pertinent information if needed.   Family History   Problem Relation Age of Onset    Cancer Father     Diabetes Maternal Grandmother     Alzheimer Disease Paternal Grandfather           Allergies:       Allergies   Allergen Reactions    Capecitabine Other (See Comments)     Severe chest pain, lasted 5 days of taking med plus infusion    Seasonal Allergies Itching            Medications:     Prior to Admission Medications   Prescriptions Last Dose Informant Patient Reported? Taking?   acetaminophen (TYLENOL) 325 MG tablet   No No   Sig: Take 2 tablets (650 mg) by mouth every 4 hours as needed for other (For optimal non-opioid multimodal pain management to improve pain control.)   fentaNYL (DURAGESIC) 50 mcg/hr 72 hr patch   No No   Sig: Place 1 patch onto the skin every 72 hours remove old patch.   hydrOXYzine (ATARAX) 25 MG tablet   No No   Sig: Take 1 tablet (25 mg) by mouth every 6 hours as needed for other or anxiety (adjuvant pain)   hydrOXYzine (ATARAX) 50 MG tablet   No No   Sig: Take 1 tablet (50 mg) by mouth every 6 hours as needed for other or anxiety (adjuvant pain)   loratadine (CLARITIN) 10 MG tablet   Yes No   Sig: Take 10 mg by mouth daily as needed for allergies   methocarbamol (ROBAXIN) 750 MG tablet   No No   Sig: Take 1 tablet (750 mg) by mouth every 6 hours as needed for muscle spasms   oxyCODONE IR (ROXICODONE) 5 MG tablet   No No   Sig: Take 2-3 tablets (10-15 mg) by mouth every 4 hours as needed for moderate to severe pain   pantoprazole (PROTONIX) 40 MG EC tablet   No No   Sig: Take 1 tablet (40 mg) by mouth daily   polyethylene glycol (MIRALAX) 17 g packet   No No   Sig: Take 17 g by mouth daily   psyllium (METAMUCIL) WAFR   No No   Sig: Take 2 Wafers by mouth daily   senna-docusate (SENOKOT-S/PERICOLACE) 8.6-50 MG tablet   No No   Sig: Take 1 tablet by mouth 2 times daily   traZODone (DESYREL) 50 MG tablet   No No   Sig: Take 0.5 tablets (25 mg) by mouth At Bedtime      Facility-Administered Medications: None             Physical Exam:   Blood pressure 115/68, pulse 108, temperature 98.1  F (36.7  C), temperature source Oral, resp. rate 18, weight 72.6 kg (160 lb 0.9 oz), SpO2 95  %.  General Appearance: Alert and awake, answering questions appropriately. Chronically ill appearing.   HEENT: Anicteric sclera, MMM   Respiratory: Breathing comfortably on room air, lungs CTAB without wheezing or crackles   Cardiovascular: RRR, S1, S2. No murmur noted  GI: Abdomen soft, non-tender with active bowel sounds. No guarding or rebound  Lymph/Hematologic: No peripheral edema  Skin: No rash or jaundice on expose skin of face, arms or legs. Dressing to L hip C/D/I.   Musculoskeletal: Moves all extremities   Neurologic: No focal deficits, CN II-XII grossly intact.

## 2023-08-25 ENCOUNTER — APPOINTMENT (OUTPATIENT)
Dept: OCCUPATIONAL THERAPY | Facility: SKILLED NURSING FACILITY | Age: 43
End: 2023-08-25
Attending: INTERNAL MEDICINE
Payer: COMMERCIAL

## 2023-08-25 ENCOUNTER — APPOINTMENT (OUTPATIENT)
Dept: PHYSICAL THERAPY | Facility: SKILLED NURSING FACILITY | Age: 43
End: 2023-08-25
Attending: INTERNAL MEDICINE
Payer: COMMERCIAL

## 2023-08-25 LAB
ALBUMIN SERPL BCG-MCNC: 3.2 G/DL (ref 3.5–5.2)
ALP SERPL-CCNC: 297 U/L (ref 40–129)
ALT SERPL W P-5'-P-CCNC: 85 U/L (ref 0–70)
ANION GAP SERPL CALCULATED.3IONS-SCNC: 9 MMOL/L (ref 7–15)
AST SERPL W P-5'-P-CCNC: 30 U/L (ref 0–45)
BASOPHILS # BLD MANUAL: 0.2 10E3/UL (ref 0–0.2)
BASOPHILS NFR BLD MANUAL: 2 %
BILIRUB SERPL-MCNC: 0.5 MG/DL
BUN SERPL-MCNC: 20 MG/DL (ref 6–20)
CALCIUM SERPL-MCNC: 9.3 MG/DL (ref 8.6–10)
CHLORIDE SERPL-SCNC: 98 MMOL/L (ref 98–107)
CREAT SERPL-MCNC: 0.76 MG/DL (ref 0.67–1.17)
DACRYOCYTES BLD QL SMEAR: SLIGHT
DEPRECATED HCO3 PLAS-SCNC: 29 MMOL/L (ref 22–29)
EOSINOPHIL # BLD MANUAL: 0 10E3/UL (ref 0–0.7)
EOSINOPHIL NFR BLD MANUAL: 0 %
ERYTHROCYTE [DISTWIDTH] IN BLOOD BY AUTOMATED COUNT: 13.3 % (ref 10–15)
GFR SERPL CREATININE-BSD FRML MDRD: >90 ML/MIN/1.73M2
GLUCOSE SERPL-MCNC: 97 MG/DL (ref 70–99)
HCT VFR BLD AUTO: 34.8 % (ref 40–53)
HGB BLD-MCNC: 11.6 G/DL (ref 13.3–17.7)
LYMPHOCYTES # BLD MANUAL: 1.2 10E3/UL (ref 0.8–5.3)
LYMPHOCYTES NFR BLD MANUAL: 11 %
MCH RBC QN AUTO: 28.1 PG (ref 26.5–33)
MCHC RBC AUTO-ENTMCNC: 33.3 G/DL (ref 31.5–36.5)
MCV RBC AUTO: 84 FL (ref 78–100)
MONOCYTES # BLD MANUAL: 0 10E3/UL (ref 0–1.3)
MONOCYTES NFR BLD MANUAL: 0 %
NEUTROPHILS # BLD MANUAL: 9.6 10E3/UL (ref 1.6–8.3)
NEUTROPHILS NFR BLD MANUAL: 87 %
PLAT MORPH BLD: ABNORMAL
PLATELET # BLD AUTO: 368 10E3/UL (ref 150–450)
POTASSIUM SERPL-SCNC: 4.1 MMOL/L (ref 3.4–5.3)
PROT SERPL-MCNC: 6.2 G/DL (ref 6.4–8.3)
RBC # BLD AUTO: 4.13 10E6/UL (ref 4.4–5.9)
RBC MORPH BLD: ABNORMAL
SODIUM SERPL-SCNC: 136 MMOL/L (ref 136–145)
WBC # BLD AUTO: 11 10E3/UL (ref 4–11)

## 2023-08-25 PROCEDURE — 250N000011 HC RX IP 250 OP 636: Mod: JZ | Performed by: PHYSICIAN ASSISTANT

## 2023-08-25 PROCEDURE — 80053 COMPREHEN METABOLIC PANEL: CPT | Performed by: PHYSICIAN ASSISTANT

## 2023-08-25 PROCEDURE — 250N000009 HC RX 250: Performed by: PHYSICIAN ASSISTANT

## 2023-08-25 PROCEDURE — 85027 COMPLETE CBC AUTOMATED: CPT | Performed by: PHYSICIAN ASSISTANT

## 2023-08-25 PROCEDURE — 258N000003 HC RX IP 258 OP 636: Performed by: INTERNAL MEDICINE

## 2023-08-25 PROCEDURE — 250N000013 HC RX MED GY IP 250 OP 250 PS 637: Performed by: PHYSICIAN ASSISTANT

## 2023-08-25 PROCEDURE — 250N000013 HC RX MED GY IP 250 OP 250 PS 637: Performed by: INTERNAL MEDICINE

## 2023-08-25 PROCEDURE — 97535 SELF CARE MNGMENT TRAINING: CPT | Mod: GO

## 2023-08-25 PROCEDURE — 120N000009 HC R&B SNF

## 2023-08-25 PROCEDURE — 85007 BL SMEAR W/DIFF WBC COUNT: CPT | Performed by: PHYSICIAN ASSISTANT

## 2023-08-25 PROCEDURE — 97166 OT EVAL MOD COMPLEX 45 MIN: CPT | Mod: GO

## 2023-08-25 PROCEDURE — 97163 PT EVAL HIGH COMPLEX 45 MIN: CPT | Mod: GP | Performed by: STUDENT IN AN ORGANIZED HEALTH CARE EDUCATION/TRAINING PROGRAM

## 2023-08-25 PROCEDURE — 36592 COLLECT BLOOD FROM PICC: CPT | Performed by: PHYSICIAN ASSISTANT

## 2023-08-25 PROCEDURE — 250N000011 HC RX IP 250 OP 636: Mod: JZ | Performed by: INTERNAL MEDICINE

## 2023-08-25 PROCEDURE — 97530 THERAPEUTIC ACTIVITIES: CPT | Mod: GP | Performed by: STUDENT IN AN ORGANIZED HEALTH CARE EDUCATION/TRAINING PROGRAM

## 2023-08-25 RX ORDER — HEPARIN SODIUM,PORCINE 10 UNIT/ML
5-20 VIAL (ML) INTRAVENOUS EVERY 24 HOURS
Status: DISCONTINUED | OUTPATIENT
Start: 2023-08-25 | End: 2023-09-08 | Stop reason: HOSPADM

## 2023-08-25 RX ORDER — DEXTROSE MONOHYDRATE 50 MG/ML
10-20 INJECTION, SOLUTION INTRAVENOUS ONCE
Status: COMPLETED | OUTPATIENT
Start: 2023-08-25 | End: 2023-08-25

## 2023-08-25 RX ORDER — HEPARIN SODIUM,PORCINE 10 UNIT/ML
5-20 VIAL (ML) INTRAVENOUS
Status: DISCONTINUED | OUTPATIENT
Start: 2023-08-25 | End: 2023-09-08 | Stop reason: HOSPADM

## 2023-08-25 RX ORDER — LOPERAMIDE HCL 2 MG
4 CAPSULE ORAL ONCE
Status: COMPLETED | OUTPATIENT
Start: 2023-08-25 | End: 2023-08-25

## 2023-08-25 RX ORDER — DEXTROSE MONOHYDRATE 50 MG/ML
10-20 INJECTION, SOLUTION INTRAVENOUS
Status: DISCONTINUED | OUTPATIENT
Start: 2023-08-25 | End: 2023-08-25

## 2023-08-25 RX ADMIN — PANTOPRAZOLE SODIUM 40 MG: 40 TABLET, DELAYED RELEASE ORAL at 09:06

## 2023-08-25 RX ADMIN — HEPARIN, PORCINE (PF) 10 UNIT/ML INTRAVENOUS SYRINGE 10 ML: at 09:06

## 2023-08-25 RX ADMIN — ACETAMINOPHEN 975 MG: 325 TABLET, FILM COATED ORAL at 21:03

## 2023-08-25 RX ADMIN — ENOXAPARIN SODIUM 40 MG: 40 INJECTION SUBCUTANEOUS at 21:03

## 2023-08-25 RX ADMIN — HEPARIN, PORCINE (PF) 10 UNIT/ML INTRAVENOUS SYRINGE 5 ML: at 21:04

## 2023-08-25 RX ADMIN — DEXTROSE MONOHYDRATE 360 MCG: 50 INJECTION, SOLUTION INTRAVENOUS at 20:57

## 2023-08-25 RX ADMIN — Medication 5 UNITS: at 09:20

## 2023-08-25 RX ADMIN — LOPERAMIDE HYDROCHLORIDE 4 MG: 2 CAPSULE ORAL at 15:05

## 2023-08-25 RX ADMIN — ACETAMINOPHEN 975 MG: 325 TABLET, FILM COATED ORAL at 14:21

## 2023-08-25 RX ADMIN — HEPARIN, PORCINE (PF) 10 UNIT/ML INTRAVENOUS SYRINGE 10 ML: at 17:57

## 2023-08-25 RX ADMIN — Medication 2 WAFER: at 09:19

## 2023-08-25 RX ADMIN — ACETAMINOPHEN 975 MG: 325 TABLET, FILM COATED ORAL at 09:06

## 2023-08-25 RX ADMIN — OXYCODONE HYDROCHLORIDE 15 MG: 10 TABLET ORAL at 18:09

## 2023-08-25 RX ADMIN — Medication 25 MG: at 21:03

## 2023-08-25 ASSESSMENT — ACTIVITIES OF DAILY LIVING (ADL)
ADLS_ACUITY_SCORE: 35
BADLS,_PREVIOUS_FUNCTIONAL_LEVEL: INDEPENDENT
ADLS_ACUITY_SCORE: 35
BADLS,_PREVIOUS_FUNCTIONAL_LEVEL: INDEPENDENT
ADLS_ACUITY_SCORE: 35
IADLS,_PREVIOUS_FUNCTIONAL_LEVEL: INDEPENDENT
ADLS_ACUITY_SCORE: 35
ADLS_ACUITY_SCORE: 35
IADLS,_PREVIOUS_FUNCTIONAL_LEVEL: INDEPENDENT

## 2023-08-25 NOTE — PLAN OF CARE
Problem: Oral Intake Inadequate  Goal: Improved Oral Intake  Description: Perform a nutrition assessment that includes a nutrition-focused physical exam; identify malnutrition risk.    Assess for adequate oral intake; if inadequate, offer oral supplemental food or drinks to enhance calorie and protein intake.    Assess for vitamin and mineral deficiencies; supplement if depleted.    Assess need and assist with with meal set-up and feeding.    Adjust diet or meal schedule based on preferences and tolerance.    Minimize unnecessary dietary restrictions to increase oral intake.      Outcome: Progressing   Goal Outcome Evaluation:       Pt with decreased appetite, taste changes. See RD note for full assessment and recommendations.

## 2023-08-25 NOTE — PLAN OF CARE
Goal Outcome Evaluation:    Pt is alert and oriented x 4 can make needs know, on regular combined diet, assist of 1 with walker and gait belt, urinal by bed side at night ,emptied. Pt is continent of B&B. Right PICC dressing is CDI , saline locked. Fentanyl patch on the left arm. Non beading weight on th left leg. Denies SOB, CP, and no n/v. Pt denied pain. No acute issues during this shift, call light within reach, continue with plan of care.        Patient's most recent vital signs are:     Vital signs:  BP: 120/75  Temp: 98.6  HR: 114  RR: 18  SpO2: 95 %     Patient does not have new respiratory symptoms.  Patient does not have new sore throat.  Patient does not have a fever greater than 99.5.

## 2023-08-25 NOTE — PROGRESS NOTES
CLINICAL NUTRITION SERVICES - ASSESSMENT NOTE     Nutrition Prescription    RECOMMENDATIONS FOR MDs/PROVIDERS TO ORDER:  None at present.    Malnutrition Status:    Moderate malnutrition in the context of chronic disease.    Recommendations already ordered by Registered Dietitian (RD):  Ordered berry Ensure Clear at 2 pm.  Ordered vanilla Magic Cup with lunch + supplements PRN.    Future/Additional Recommendations:  Monitor intake, weight, supplement preferences.     REASON FOR ASSESSMENT  Sarbjit Swain is a/an 43 year old male assessed by the dietitian for Admission Nutrition Risk Screen for positive - 2-13 lb wt loss and decreased appetite and Provider Order - malnutrition    NUTRITION HISTORY  Chart reviewed.  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 Transferred to TCU on 8/24/23 with deconditioning for continued rehabilitation.     Met with pt.  He reports that his appetite isn't there. He eats breakfast and lunch everyday at least. He continues to have taste changes, doesn't have any go-to foods that taste good consistently. His appetite is also decreased because he's not moving around as much. He doesn't like the Ensure Clear that much, but is agreeable to resuming it here (was receiving it on [facility]). He doesn't like Ensure Enlive. He's agreeable to trying vanilla Magic Cup with lunch because he likes ice cream.    CURRENT NUTRITION ORDERS  Diet: Regular  Intake/Tolerance: Pt consumed 100% of breakfast today per flowsheet.    LABS  Labs reviewed  Alk phos 297  "(H). ALT 85 (H).    MEDICATIONS  Medications reviewed  Miralax, senna  Psyllium  PRN zofran    ANTHROPOMETRICS  Height: 159 cm (5' 2.6\")  Most Recent Weight: 72.6 kg (160 lb 0.9 oz)    IBW: 55.3 kg (131% IBW)  BMI: 28.72 kg/m2 - Overweight BMI 25-29.9  Weight History:   Wt Readings from Last 15 Encounters:   08/24/23 72.6 kg (160 lb 0.9 oz) - bed scale   08/24/23 74.8 kg (164 lb 12.8 oz) - standing   77.2 kg (170 lb 3.1 oz) 07/27/2023  81.5 kg (179 lb 10.8 oz) 06/21/2023  81.5 kg (179 lb 10.8 oz) 06/06/2023  81.5 kg (179 lb 12.6 oz) 04/21/2023  82.7 kg (182 lb 5.1 oz) 01/20/2023  3.1% wt loss in 1 month. 8.2% wt loss in 3 months.    Dosing Weight: 59.6 kg (adjusted based on actual wt and IBW)    ASSESSED NUTRITION NEEDS  [facility] BMR (using 74.8 kg dw): 1522 kcals/day  Estimated Energy Needs: 1490 - 1788+ kcals/day (25 - 30+ kcals/kg )  Justification: Maintenance vs weight repletion  Estimated Protein Needs: 72 - 89 grams protein/day (1.2 - 1.5 grams of pro/kg)  Justification: Increased needs  Estimated Fluid Needs: 1490 - 1788 mL/day (1 mL/kcal)   Justification: Maintenance    PHYSICAL FINDINGS  See malnutrition section below.    MALNUTRITION  % Intake: < 75% for > 7 days (moderate)  % Weight Loss: > 7.5% in 3 months (severe)  Subcutaneous Fat Loss: None observed  Muscle Loss: Temporal:  mild  Fluid Accumulation/Edema: None noted  Malnutrition Diagnosis: Moderate malnutrition in the context of chronic disease.    NUTRITION DIAGNOSIS  Inadequate oral intake related to decreased appetite 2/2 taste changes and immobility as evidenced by patient report and 8.2% wt loss in 3 months.      INTERVENTIONS  Implementation  Nutrition Education: RD role in care   Medical food supplement therapy     Goals  Patient to consume % of nutritionally adequate meal trays TID, or the equivalent with supplements/snacks.     Monitoring/Evaluation  Progress toward goals will be monitored and evaluated per protocol.  Alpesh Brown RD, " RIVERA BUENO/OB/5AR[address] RD pager: [phone]

## 2023-08-25 NOTE — PROGRESS NOTES
08/25/23 1324   Appointment Info   Signing Clinician's Name / Credentials (OT) Karolina Heredia OTR/L   Quick Adds   Quick Adds Certification   Living Environment   People in Home spouse;child(pauline), dependent   Current Living Arrangements house   Home Accessibility stairs to enter home;stairs within home   Number of Stairs, Main Entrance 1   Stair Railings, Main Entrance none   Number of Stairs, Within Home, Primary five   Stair Railings, Within Home, Primary railing on left side (ascending)   Transportation Anticipated family or friend will provide   Living Environment Comments 3 level split, no bathroom on main level, main bed bath upstairs   Self-Care   Usual Activity Tolerance good   Current Activity Tolerance poor   Regular Exercise No   Equipment Currently Used at Home shower chair;crutches;wheelchair, manual   Fall history within last six months yes   Number of times patient has fallen within last six months 2   Activity/Exercise/Self-Care Comment Pt reports IND at baseline for ADLs/IADLs. Reports has been using w/c around his house most recently 2/2 fatigue.   Instrumental Activities of Daily Living (IADL)   Previous Responsibilities housekeeping;meal prep;shopping;laundry;medication management;work;finances;   IADL Comments Pt reports baseline IND for IADLs, works as a . Wife has been assisting/completing most IADLs recently.   Previous Level of Function/Home Environm   Bathing/Grooming, Premorbid Functional Level independent   Dressing, Premorbid Functional Level independent   Eating/Feeding, Premorbid Functional Level independent   Toileting, Premorbid Functional Level independent   BADLs, Premorbid Functional Level independent   IADLs, Premorbid Functional Level independent   Bed Mobility, Premorbid Functional Level independent   Transfers, Premorbid Functional Level independent   Household Ambulation, Premorbid Functional Level independent   Stairs, Premorbid Functional Level  independent   Community Ambulation, Premorbid Functional Level independent   General Information   Onset of Illness/Injury or Date of Surgery 08/10/23   Referring Physician Tatiana Grissom PA-C   Patient/Family Therapy Goal Statement (OT) to get back to everyday life   Additional Occupational Profile Info/Pertinent History of Current Problem Per chart: 43 male with hx of BRAC1 mutation, Greene's esophagus, BPH, medulloblastoma s/p  shunt, rectal cancer diagnosed 2021 s/p hemicolectomy and capecitabine partial therapy (discontinued due to an chest tightness reaction) and 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 Transferred to TCU on 8/24/23 with deconditioning for continued rehabilitation.   Performance Patterns (Routines, Roles, Habits) father,    Existing Precautions/Restrictions fall;weight bearing   Left Upper Extremity (Weight-bearing Status) full weight-bearing (FWB)   Right Upper Extremity (Weight-bearing Status) full weight-bearing (FWB)   Left Lower Extremity (Weight-bearing Status) toe touch weight-bearing (TTWB)   Right Lower Extremity (Weight-bearing Status) full weight-bearing (FWB)   General Observations and Info Activity Up with assist   Cognitive Status Examination   Orientation Status orientation to person, place and time   Behavioral Issues withdrawn   Affect/Mental Status (Cognitive) sad/depressed affect;flat/blunted affect   Follows Commands initiation impaired   Cognitive Status Comments Pt intermittantly tearful throughout session when discussing PLOF. Flat affect with occasional smiles throughout when discussing son and pets. Requiring cues for initiation of tasks throughout.   Visual Perception   Visual Impairment/Limitations corrective lenses  full-time   Sensory   Sensory Quick Adds sensation intact   Pain Assessment   Patient Currently in Pain Yes, see Vital Sign flowsheet  (7/10)   Posture   Posture forward head position;protracted shoulders   Range of Motion Comprehensive   General Range of Motion no range of motion deficits identified   Strength Comprehensive (MMT)   Comment, General Manual Muscle Testing (MMT) Assessment Not formally assessed, demonstrates generalized weakness   Muscle Tone Assessment   Muscle Tone Quick Adds No deficits were identified   Coordination   Upper Extremity Coordination No deficits were identified   Coordination Comments Pt reports poor dexerity at baseline, though this OT observes no functional deficits   Bed Mobility   Comment (Bed Mobility) See GG   Transfers   Transfer Comments See GG   Activities of Daily Living   BADL Assessment/Intervention other (see comments)  (See GG)   Clinical Impression   Criteria for Skilled Therapeutic Interventions Met (OT) Yes, treatment indicated   OT Diagnosis Decreased safety/engagement in ADLs/IADLs now with post surgical prec   Influenced by the following impairments imapired mobility, impaired strength, post surgical precautions, impaired balance, pain   OT Problem List-Impairments impacting ADL problems related to;activity tolerance impaired;cognition;balance;strength;post-surgical precautions;pain;mobility   Assessment of Occupational Performance 3-5 Performance Deficits   Identified Performance Deficits dressing, bathing, toileting, home mgmt, childcare   Planned Therapy Interventions (OT) ADL retraining;IADL retraining;cognition;transfer training;strengthening;home program guidelines;progressive activity/exercise;risk factor education   Clinical Decision Making Complexity (OT) moderate complexity   Anticipated Equipment Needs Upon Discharge (OT) walker, standard;dressing equipment   Risk & Benefits of therapy have been explained evaluation/treatment results reviewed;care  plan/treatment goals reviewed;risks/benefits reviewed;current/potential barriers reviewed;participants voiced agreement with care plan;participants included;patient   Clinical Impression Comments Pt will benefit from continued skilled OT services to progress IND and safety with ADLs/IADLs within prec.   OT Total Evaluation Time   OT Eval, Moderate Complexity Minutes (85919) 25   Therapy Certification   Medical Diagnosis s/p CRPP of L femoral neck   Start of Care Date 08/25/23   Certification date from 08/25/23   Certification date to 09/23/23   OT Goals   Therapy Frequency (OT) 6 times/wk   OT Predicted Duration/Target Date for Goal Attainment 09/07/23   OT Goals Hygiene/Grooming;Upper Body Dressing;Lower Body Dressing;Upper Body Bathing;Lower Body Bathing;Toilet Transfer/Toileting;Meal Preparation;Cognition;Home Management   OT: Hygiene/Grooming modified independent;within precautions   OT: Upper Body Dressing Modified independent;within precautions;including set-up/clothing retrieval   OT: Lower Body Dressing Modified independent;within precautions;using adaptive equipment   OT: Upper Body Bathing Supervision/stand-by assist;within precautions   OT: Lower Body Bathing with precautions;Supervision/stand-by assist   OT: Toilet Transfer/Toileting Modified independent;toilet transfer;cleaning and garment management;using adaptive equipment   OT: Meal Preparation Modified independent;with simple meal preparation   OT: Home Management Modified independent;with light demand household tasks;within precautions   OT: Cognitive Patient/caregiver will verbalize understanding of cognitive assessment results/recommendations as needed for safe discharge planning   Self-Care/Home Management   Self-Care/Home Mgmt/ADL, Compensatory, Meal Prep Minutes (75749) 35   Symptoms Noted During/After Treatment (Meal Preparation/Planning Training) fatigue;increased pain   Treatment Detail/Skilled Intervention Session focused on ADL routine  (see GG). Pt requiring VC for initiation of g/h tasks while seated in bathroom. Increased time required for rapport buidling and therapeutic listening as pt tearful throughout session re: extended hospital stays. Provided orientation to TCU setting to assist with pt directed goal planning. Pt wearing spandi- throughout with BP remaining stable during positional changes and pt with no c/o dizziness.   OT Discharge Planning   OT Plan Prec: fall, TTWB LLE, immunosuppressed, tx: w/c to extended tub bench, SPT with FWW/GB, LB dressing with AE, BUE strengthening within lab value prec   OT Discharge Recommendation (DC Rec) home with assist;home with home care occupational therapy   OT Rationale for DC Rec Pt reports hopeful to find TCU closer to home in [place]. Anticipate require assist for heavy IADLs at d/c   OT Brief overview of current status Pt below baseline but motivated to return to near baseline. Ax1 with FWW for SPT from EOB<>w/c or commode   Total Session Time   Timed Code Treatment Minutes 35   Total Session Time (sum of timed and untimed services) 60   Bed Mobility: Turning side to side/Roll Left and Right   Patient Performance Independent   Staff Performance No setup or physical help (No setup or physical help from staff)   Describe Performance using bedrail   Bed Mobility: Sit to lying   Describe Performance Min A for LLE   Bed Mobility: Lying to sitting on the side of bed   Describe Performance Min A for LLE   Transfers: Sit to Stand   Describe Performance Min A with FWW   Transfers: Chair/Bed transfers   Describe Performance Min A with FWW   Lower Body Dressing (Pants/Undergarments)   Describe Performance Anticipate mod A per clinical reasoning, pt declining 2/2 frequent BM   Lower Body Dressing (Putting On/Taking-Off Footwear)   Patient Performance Total dependence (helper does ALL of the effort)   Describe Performance donning socks   Transfers: Toilet transfers   Describe Performance Pt reports Ax1 with  nursing, declines attempt at this time   Oral Hygiene   Describe Performance SBA, seated in w/c, VC for initiation

## 2023-08-25 NOTE — PLAN OF CARE
Goal Outcome Evaluation:    Patient is alert and oriented x 4. Able to make needs known. Pt denied SOB, N/V and chest pain, but reported several loose stools. Received STAT Imodium ordered. Left PICC line is patent with intact dressing. Pt is continent of bowel and bladder with commode at bed side. Pt is assist of 1 with walker and gait belt. Wound dressing on left hip is clean and intact.    Patient's most recent vital signs are:     Vital signs:  BP: 107/75  Temp: 98.6  HR: 108  RR: 18  SpO2: 95 %     Patient does not have new respiratory symptoms.  Patient does not have new sore throat.  Patient does not have a fever greater than 99.5.

## 2023-08-25 NOTE — PLAN OF CARE
Goal Outcome Evaluation:    Pt is A/O x 4, calm and cooperative with cares. VSS, denies SOB, CP, N/V, fever and chills. Assist x 1, non-weight bearing on the left hip. Has right DL Basilic PICC, dressing CDI. Continent of B/B, uses urinal at bed side LBM 08/24. On Combined regular diet with poor appetite, did not have dinner this shift. Takes meds whole with thin liquids. Dressing to the left hip CDI. Able to make needs known. Continue with POC.    Patient's most recent vital signs are:     Vital signs:  BP: 120/75  Temp: 98.6  HR: 114  RR: 18  SpO2: 95 %     Patient does not have new respiratory symptoms.  Patient does not have new sore throat.  Patient does not have a fever greater than 99.5.

## 2023-08-25 NOTE — PROGRESS NOTES
"   08/25/23 1059   Appointment Info   Signing Clinician's Name / Credentials (PT) Bryce Licea DPT   Living Environment   People in Home spouse;child(pauline), dependent   Current Living Arrangements house   Home Accessibility stairs to enter home;stairs within home   Number of Stairs, Main Entrance 1   Stair Railings, Main Entrance none   Number of Stairs, Within Home, Primary five   Stair Railings, Within Home, Primary railing on left side (ascending)   Transportation Anticipated family or friend will provide   Living Environment Comments 3 level split entry home, 4-5 steps to get to lower level   Self-Care   Usual Activity Tolerance good   Current Activity Tolerance poor   Fall history within last six months yes   Number of times patient has fallen within last six months 2   Activity/Exercise/Self-Care Comment PLOF pt was ind with mobility, ADLs, IADLs including working as    Post-Acute Assessment Only   Post-Acute Functional Assessment See below   Previous Level of Function/Home Environm   Bathing/Grooming, Premorbid Functional Level independent   Dressing, Premorbid Functional Level independent   Eating/Feeding, Premorbid Functional Level independent   Toileting, Premorbid Functional Level independent   BADLs, Premorbid Functional Level independent   IADLs, Premorbid Functional Level independent   Bed Mobility, Premorbid Functional Level independent   Transfers, Premorbid Functional Level independent   Household Ambulation, Premorbid Functional Level independent   Stairs, Premorbid Functional Level independent   Community Ambulation, Premorbid Functional Level independent   General Information   Onset of Illness/Injury or Date of Surgery 08/09/23   Referring Physician Tatiana Grissom, PA-C   Patient/Family Therapy Goals Statement (PT) \"I just want to walk again\"   Pertinent History of Current Problem (include personal factors and/or comorbidities that impact the POC) BRAC1 mutation, Greene's esophagus, " BPH, medulloblastoma s/p  shunt, rectal cancer diagnosed 2021 s/p hemicolectomy and capecitabine partial therapy (discontinued due to an chest tightness reaction) and new osteosarcoma of the left ilium who was initially admitted on 8/10 after a pathologic left femoral neck and acetabulum fracture and underwent repair with ortho on 8/14. Transferred to [place] 8/16 on medicine team for ongoing management and initiation of chemotherapy which was started on 8/19   Existing Precautions/Restrictions fall;immunosuppressed;weight bearing;other (see comments)  (orthostatic)   Weight-Bearing Status - LLE touch down weight-bearing   Heart Disease Risk Factors Medical history;Stress   General Observations cancer, pathologic left femoral neck and acetabulum fracture, s/p intermedulary repair   Cognition   Behavioral Issues withdrawn   Safety Deficit (Cognition) impulsivity   Cognitive Status Comments flat affect, some self limiting behaviros   Pain Assessment   Patient Currently in Pain Yes, see Vital Sign flowsheet   Range of Motion (ROM)   ROM Comment grossly WFL excepting L painful hip   Strength (Manual Muscle Testing)   Strength Comments RLE 5/5, LLE not able to formally assess 2/2 pain   Balance   Balance Comments good sitting balalnce, needs heavy UE support in standing 2/2 pain, orthostatic BP and WB restrictions   Clinical Impression   Criteria for Skilled Therapeutic Intervention Yes, treatment indicated   PT Diagnosis (PT) generalized weakness, deconditioning   Influenced by the following impairments activity tolerance, LLE WB restrictions, functional strength and balance, BP status   Functional limitations due to impairments bed mobility, transfers, gait, stairs, household and community mobility, ability to drive, perform work duties   Clinical Presentation (PT Evaluation Complexity) Evolving/Changing   Clinical Presentation Rationale complex medical, > 4 personal factors limiting independence with functional  mobility, limitations in all ICF domains   Clinical Decision Making (Complexity) high complexity   Planned Therapy Interventions (PT) balance training;bed mobility training;gait training;home exercise program;patient/family education;strengthening;stair training;transfer training;wheelchair management/propulsion training;progressive activity/exercise;risk factor education;home program guidelines   Anticipated Equipment Needs at Discharge (PT) wheelchair cushion;wheelchair;gait belt   Risk & Benefits of therapy have been explained evaluation/treatment results reviewed;risks/benefits reviewed;care plan/treatment goals reviewed;current/potential barriers reviewed;participants voiced agreement with care plan;participants included;patient   Clinical Impression Comments PLOF pt was ind, worked FT as . Currently requires MIN A for functional mobility, limited tolerance to OOB d/t pain, fatigue and orthostatic BP. To benefit from skilled PT serivcves to maximize independence and decrease care giver burden. Pt [facility] [place]-  is looking into this possibility.   PT Total Evaluation Time   PT Eval, High Complexity Minutes (44574) 15   Therapy Certification   Start of care date 08/24/23   Certification date from 08/25/23   Certification date to 09/23/23   Physical Therapy Goals   PT Frequency Other (see comments)  (30-60 minutes 5-6 days weekly)   PT Predicted Duration/Target Date for Goal Attainment 09/14/23   PT Goals Bed Mobility;Transfers;Gait;Stairs;Wheelchair Mobility;PT Goal 1;PT Goal 2   PT: Bed Mobility Modified independent;Within precautions   PT: Transfers Modified independent;Sit to/from stand;Bed to/from chair;Assistive device;Within precautions  (using FWW for home negotiation w/in TTWB LLE)   PT: Gait Modified independent;Rolling walker;10 feet  (to be able to access areas in home wc can't)   PT: Stairs Moderate assist;5 stairs  (for  "home access)   PT: Wheelchair Mobility manual wheelchair;150 feet;Greater than 500 feet;Caregiver Mod assist  (ind for home and limited community, care giver assist greater distances)   PT: Goal 1 car transfer MOD A or < to access personal transportation   PT: Goal 2 pt to verbalize three or > falls prevention strategies post education to promote safety in home environment.   Therapeutic Activity   Therapeutic Activities: dynamic activities to improve functional performance Minutes (49252) 40   Treatment Detail/Skilled Intervention PT: time spent setting pt up with wc for seating and mobility on and off unit, to promote tolerance with OOB. Educated pt on importance of OOB, increased fluids. Fit pt with BLE spandigrip to help manage orthostatic BP, measured for ab.binder size, but pt stated that would make him feel claustrophobic- PT again emphasized increased fluid and increased short bouts of sitting up OOB. Lowered FWW for appropriate posture, educated pt and parents on use of R shoe to help with TTWB LLE. Functional transfers- see below   PT Discharge Planning   PT Plan PT: encourage fluids, monitor BP and symptoms, sup>sit, car transfer, wc mobility for GG. supine and seated therex. Pt may need abdominal binder (measures 38.5\")   PT Discharge Recommendation (DC Rec) home with assist;Transitional Care Facility   PT Rationale for DC Rec pt/family hopeful to trasnfer to rehab closer to home. Anticipate mixed mobility primarily wc based with limited FWW, pt with limited OOB tolerance 2/2 pain, fatigue, orthostatic BP. Pending progress, pt may need laternate dispo 2/2 SHARLENE and within home   PT Brief overview of current status MIN A for transfers and bed mobility   Post Acute Settings Only   What unit is patient on? TCU   Bed Mobility: Turning side to side/Roll Left and Right   Patient Performance Independent   Staff Performance No setup or physical help (No setup or physical help from staff)   Describe Performance " "using bedrail   Bed Mobility: Sit to lying   Patient Performance Partial/moderate assist \"includes weight bearing assist of trunk or limbs\"   Staff Performance One person lynn (one person physical assist)   Describe Performance MIN A for legs   Bed Mobility: Lying to sitting on the side of bed   Describe Performance not observed during session   Transfers: Sit to Stand   Patient Performance Partial/moderate assist \"includes weight bearing assist of trunk or limbs\"   Staff Performance One person assist (one person physical assist)   Describe Performance able to stand from low WC with MIN A, able to stand from low WC with MIN A   Transfers: Chair/Bed transfers   Patient Performance Partial/moderate assist \"includes weight bearing assist of trunk or limbs\"   Staff Performance One person assist (one person physical assist)   Equipment Used Rolling walker   Describe Performance CGA once up   Walk 10 Feet   Reason Not Done Safety concerns   Walk 10 Feet on uneven surfaces   Reason Not Done Safety concerns   Walk 50 Feet with Two Turns   Reason Not Done Safety concerns   Walk 150 Feet   Reason Not Done Safety concerns   1 Step (curb)   Reason Not Done Safety concerns   4 Steps   Reason Not Done Safety concerns   12 Steps   Reason Not Done Safety concerns   Picking up Object   Reason Not Done Medical condition     "

## 2023-08-25 NOTE — PROGRESS NOTES
Social Work: Initial Assessment with Discharge Plan    Patient Name: Sarbjit Swain  : 1980  Age: 43 year old  MRN: [id number]  Completed assessment with: chart review and pt interview.  Parents were in room in am.  Vidal.  Shagufta was in room in the pm   Admitted to TCU: 23    Presenting Information   Date of SW assessment: 2023  Health Care Directive: Provided education gave blank copy.   Primary Health Care Agent: Self   Secondary Health Care Agent: WILBERK/wife/Shagufta   Living Situation: Lives with wife and  one child with his wife, a son who started 6th grade this week   Previous Functional Status: Dependent ADLs:: Independent  Dependent IADLs:: Independent  DME available: walker, standard   Patient and family understanding of hospitalization: appropriate and pleasant.   Cultural/Language/Spiritual Considerations: Pt is a 43 y.o.   male, with no Orthodoxy considerations or affiliations.   Abuse concerns: None reported.   -------------------------------------------------------------------------------------------------------------  TRANSPORTATION:    Has lack of transportation kept you from medical appointments, meetings, work, or from getting things needed for daily living?  A. Yes, it has kept me from medical appointments or from getting my medications  B. Yes, it has kept me from non-medical meetings, appointments, work or from getting things that I need  C. No  X. Patient Unable to respond  Y. Patient declines to respond  -------------------------------------------------------------------------------------------------------------  Health Literacy:   How Often do you need to have someone help you when you read instructions, pamphlets, or other written material from your doctor or pharmacy?  0.       Never  1.       Rarely  2.       Sometimes  3.       Often  4.       Always  5.       Patient declines to respond  6.       Patient unable to  respond  ------------------------------------------------------------------------------------------------------------   BIMS:  See flow sheet   -----------------------------------------------------------------------------------------------------------  CAM:  See flow sheet   -------------------------------------------------------------------------------------------------------------  PHQ-9:   See flow sheet.     -------------------------------------------------------------------------------------------------------------  SOCIAL ISOLATION  How often do you feel lonely or isolated form those around you?  0.       Never  1.       Rarely  2.       Sometimes  3.       Often  4.       Always  5.       Patient declines to respond  6.       Patient unable to respond  -------------------------------------------------------------------------------------------------------------    BIMS: Pt scored 15 on BIMS indicating cognition intact.   PHQ-9: Pt scored 6 on PHQ-9 indicating mild depressive symptoms present.   PAS: confirmation number- [id number]   Has there been a level II screen?  No  Were there any recommendations in the screen? No  If yes, will the recommendations we incorporated into the Plan of Care?  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 Transferred to TCU on 8/24/23 with deconditioning for continued rehabilitation.       Provider Information   Primary Care Physician:No Ref-Primary, Physician   Pt  said his PCP is Dr.Christian Gaines    [address] [place] [address]  [phone]  : None reported.     Mental Health:   Diagnosis: Adjustment disorder with depressed mood   Current Support/Services: Pt said that he was prescribed an antidepressant medication in the past as a teenager but did not like it and is not interested in this medication now.   Previous Services: None reported.   Services Needed/Recommended: SW offered Masha and Health Psychology services while at TCU.     Substance Use:  Diagnosis: No drinking or smoking or illegal drug use according to pt.   Current Support/Services: N/A  Previous Services: N/A  Services Needed/Recommended: N/A    Support System  Marital Status:    Family support: Parents   Pt has one child with his wife, a son who started 6th grade this week   He is the oldest of three children, he has a younger brother and sister. Also, wife's parents live close by.   Other support available: Friends (Johnsons)  Gaps in support system: None reported.     Personalized Care and Trauma  What other information would help us give you more personalized care or how can we help you with any past trauma? Only hx of medical issues.  Brain tumor and cancer.     Community Resources  Current in home services: None reported.   Previous services: None reported.     Financial/Employment/Education  Employment Status:  Pt works in shelter services at a local school.    Income Source: wages  Education: some tech school   Financial Concerns:  parking is expensive here.  [employer] gave pt's wife parking pass.  #[id number]  Insurance: BCBS/BCBS OUT OF STATE       Discharge Plan   Patient and family discharge goal: Want to go to another TCU in [facility].   Provided Education on discharge plan: YES  Patient agreeable to discharge plan:  YES  A list of Medicare Certified Facilities was provided to the patient and/or family to encourage patient choice. Based on location and rating, patient  would like referrals made to: yes, hospital SW gave them list and sent referrals.  TCU SW will follow up next week.   General information regarding anticipated insurance coverage and possible out of pocket cost was discussed. Patient and patient's family are aware patient may incur the cost of transportation to the facility, pending insurance payment: YES  SW told wife to contact insurance and find out what is covered.   Barriers to discharge: accepting facility and maybe transportation.     Discharge Recommendations   Disposition: TCU or [facility].   Transportation Needs: Stretcher transport.   Name of Transportation Company and Phone: TBD    Additional comments   Pt lives by EDWARD Raymond. Pt came to  from [facility] ED.Patient with rectal cancer, brain cancer who is in ED for pathologic framoralp neck fracture.     Per Oncology team, pt's needing MRI and anticipated to have chemo on Monday 8/21 after lung biopsy result. Should pt have chemo on Monday, the next chemo course will be after 5wks and can be completed outpatient.    Per the oncology team, pt completed his chemo for this admission and next chemo course is planned for 9/22 as outpatient.         BELLE Alvarez   [facility], Transitional Care Unit   Social Work   [address] [place] [address], [address] [place] [address]  (PH) [phone]

## 2023-08-25 NOTE — PHARMACY-MEDICATION REGIMEN REVIEW
Pharmacy Medication Regimen Review  Sarbjit Swain is a 43 year old male who is currently in the Transitional Care Unit.    Assessment: All medications have an appropriate indications, durations and no unnecessary use was found    Plan:   Continue current medications as ordered.     Attending provider will be sent this note for review.  If there are any emergent issues noted above, pharmacist will contact provider directly by phone.      Pharmacy will periodically review the resident's medication regimen for any PRN medications not administered in > 72 hours and discontinue them. The pharmacist will discuss gradual dose reductions of psychopharmacologic medications with interdisciplinary team on a regular basis.    Please contact pharmacy if the above does not answer specific medication questions/concerns.    Background:  A pharmacist has reviewed all medications and pertinent medical history today.  Medications were reviewed for appropriate use and any irregularities found are listed with recommendations.      Current Facility-Administered Medications:     [START ON 8/27/2023] - Skin Test Reading -, , Does not apply, Q21 Days, Tatiana Grissom PA-C    acetaminophen (TYLENOL) tablet 975 mg, 975 mg, Oral, TID, Rebecca Killian MD, 975 mg at 08/25/23 0906    alum & mag hydroxide-simethicone (MAALOX) suspension 30 mL, 30 mL, Oral, Q4H PRN, Tatiana Grissom PA-C    bisacodyl (DULCOLAX) suppository 10 mg, 10 mg, Rectal, BID PRN, Tatiana Grissom PA-C    calcium carbonate (TUMS) chewable tablet 1,000 mg, 1,000 mg, Oral, 4x Daily PRN, Tatiana Grissom PA-C    dextrose 5 % flush PRE/POST medication, 10-20 mL, Intravenous, Once **AND** filgrastim-aafi (NIVESTYM) in D5W infusion 360 mcg, 5 mcg/kg, Intravenous, Daily at 8 pm **AND** dextrose 5 % flush PRE/POST medication, 10-20 mL, Intravenous, Daily at 8 pm, Tatiana Grissom PA-C    enoxaparin ANTICOAGULANT (LOVENOX) injection 40 mg, 40 mg, Subcutaneous, Q24H,  Tatiana Grissom PA-C, 40 mg at 08/24/23 2058    [START ON 8/27/2023] fentaNYL (DURAGESIC) 50 mcg/hr 72 hr patch 1 patch, 50 mcg, Transdermal, Q72H **AND** [START ON 8/27/2023] fentaNYL (DURAGESIC) Patch in Place, , Transdermal, Q8H MARY, Tatiana Grissom PA-C    heparin lock flush 10 UNIT/ML injection 5-20 mL, 5-20 mL, Intracatheter, Q24H, Rebecca Killian MD, 10 mL at 08/25/23 0906    heparin lock flush 10 UNIT/ML injection 5-20 mL, 5-20 mL, Intracatheter, Q1H PRN, Rebecca Killian MD    hydrOXYzine (ATARAX) tablet 25-50 mg, 25-50 mg, Oral, Q6H PRN, Tatiana Grissom PA-C    loratadine (CLARITIN) tablet 10 mg, 10 mg, Oral, Daily PRN, Tatiana Grissom PA-C    methocarbamol (ROBAXIN) tablet 750 mg, 750 mg, Oral, Q6H PRN, Tatiana Grissom PA-C    naloxone (NARCAN) injection 0.2 mg, 0.2 mg, Intravenous, Q2 Min PRN **OR** naloxone (NARCAN) injection 0.4 mg, 0.4 mg, Intravenous, Q2 Min PRN **OR** naloxone (NARCAN) injection 0.2 mg, 0.2 mg, Intramuscular, Q2 Min PRN **OR** naloxone (NARCAN) injection 0.4 mg, 0.4 mg, Intramuscular, Q2 Min PRN, Rebecca Killian MD    Nurse may request from Pharmacy a change of form of medication (e.g. Liquid to tablet)., , Does not apply, Continuous PRN, Tatiana Grissom PA-C    ondansetron (ZOFRAN ODT) ODT tab 4 mg, 4 mg, Oral, Q6H PRN **OR** ondansetron (ZOFRAN) injection 4 mg, 4 mg, Intravenous, Q6H PRN, Tatiana Grissom PA-C    oxyCODONE IR (ROXICODONE) tablet 10 mg, 10 mg, Oral, Q4H PRN **OR** oxyCODONE (ROXICODONE) tablet 15 mg, 15 mg, Oral, Q4H PRN, Tatiana Grissom PA-C    pantoprazole (PROTONIX) EC tablet 40 mg, 40 mg, Oral, Daily, Tatiana Grissom PA-C, 40 mg at 08/25/23 0906    polyethylene glycol (MIRALAX) Packet 17 g, 17 g, Oral, Daily, Tatiana Grissom PA-C    psyllium (METAMUCIL) wafer 2 Wafer, 2 Wafer, Oral, Daily, Tatiana Grissom PA-C, 2 Wafer at 08/25/23 0919    senna-docusate (SENOKOT-S/PERICOLACE) 8.6-50 MG per tablet 1 tablet, 1 tablet, Oral, BID,  Tatiana Grissom PA-C    sodium chloride (PF) 0.9% PF flush 10-20 mL, 10-20 mL, Intracatheter, q1 min prn, Rbeecca Killian MD    sodium chloride (PF) 0.9% PF flush 10-40 mL, 10-40 mL, Intracatheter, Q8H, Rebecca Killian MD, 20 mL at 08/25/23 0917    traZODone (DESYREL) half-tab 25 mg, 25 mg, Oral, At Bedtime, Tatiana Grissom PA-C, 25 mg at 08/24/23 2100    tuberculin injection 5 Units, 5 Units, Intradermal, Q21 Days, Tatiana Grissom PA-C, 5 Units at 08/25/23 0920  No current outpatient prescriptions on file.   PMH: BRCA1 genotype, medulloblastoma at age 4 s/p  shunt (treated here at [facility]), rectal cancer diagnosed around 2 years ago s/p hemicolectomy   Grady Kelly, MaikelD

## 2023-08-25 NOTE — PHARMACY-ADMISSION MEDICATION HISTORY
Admission medication history completed at [facility]. Please see Pharmacy - Admission Medication History note from 8/10/2023.

## 2023-08-25 NOTE — CARE PLAN
Care Plan created. Bedside RN to assess on progression and update.     Problem: Hip Fracture Surgical Repair  Goal: Optimal Functional Ability  Description: Identify and address modifiable risk factors which may inhibit bone healing (e.g., comorbidity, medication, poor nutrition).    Maintain stabilization, device position and integrity to promote reduction and alignment of fracture (e.g., continuous line of pull, counter traction, weights hanging free).    Minimize unnecessary movement and disruption of alignment; provide firm bed support.    Maintain weightbearing and activity restrictions until bone healing is established.    Promote optimal oxygenation, fluid balance and nutrition to support bone healing.    Prepare for possible surgical intervention (e.g., pin placement, arthroplasty).    Promote osteoporosis management for patients older than 50 years of age.           Problem: Coping Ineffective (Oncology Care)  Goal: Effective Coping  Description: Provide opportunity for expression of feelings, perceptions and stressors.    Assist with accurate evaluation of situation; encourage focus on the present.    Discuss previous challenging situations that were mastered to enhance self-esteem and self-efficacy.    Identify and utilize strengths; acknowledge progress and accomplishments.    Identify positive coping strategies (e.g., music, spirituality, optimism, reframing, problem-solving).    Support stress-reduction (e.g., self-care, calming techniques, pet therapy         Problem: Pain Chronic (Persistent)  Goal: Optimal Pain Control and Function  Description: Evaluate pain level, effect of treatment and patient response at regular intervals.    Minimize pain stimuli; coordinate care and adjust environment (e.g., light, noise, unnecessary movement); promote sleep/rest.    Match pharmacologic analgesia to severity and type of pain mechanism (e.g., neuropathic, muscle, inflammatory); consider multimodal approach  (e.g., nonopioid, opioid, adjuvant).    Provide medication at regular intervals; titrate to patient response.    Manage breakthrough pain with additional doses; consider rotation or switching medication.         Problem: Anxiety Signs/Symptoms  Goal: Improved Mood Symptoms (Anxiety Signs/Symptoms)  Description: Maintain a calm and reassuring environment; minimize noise; provide familiar items; cluster care; offer choices.    Encourage support system presence and participation.    Support expression and identification of feelings and worries; compassionately acknowledge and validate concerns.    Utilize existing coping strategies and assist in developing new strategies (e.g., music, deep breathing, relaxation techniques, massage, meditation or pet therapy).    Identify thoughts and feelings that led to current anxiety onset to enhance understanding of triggers.    Reframe anxiety-provoking situations; provide a new perspective; engage in problem-solving.         Problem: Depressive Signs/Symptoms  Goal: Improved Mood Symptoms (Depressive Signs/Symptoms)  Description: Provide opportunity for patient and family/caregiver to express feelings, stressors and self-perception (e.g., verbalization, journaling).    Convey caring approach, empathy and potential for change; hope is key for recovery.    Utilize existing coping strategies and assist in developing new strategies, such as relaxation, music and problem-solving; assess for ineffective strategies (e.g., substance use, isolation).    Recognize past and present achievements, successes and personal strengths.    Empower participation in planning care and activities, as well as development of attainable goals, to increase self-esteem and feelings of control.    Promote self-care; support balanced sleep, physical activity and nutrition.         Problem: BADL (Basic Activities of Daily Living) Impairment  Goal: Optimal Safe BADL Performance  Description: Assess BADL (basic  activity of daily living) abilities; encourage participation at maximally safe independent level.    Provide assistance and supervision needed to maintain safety; involve caregiver in BADL (basic activity of daily living) training.    Ensure effective use of equipment or devices, such as a long-handled reacher, shower seat or orthosis.    Ensure proper body mechanics and positioning for optimal task performance.    Provide set-up of items if patient is unable to retrieve; store personal care items in accessible location.    Schedule BADL (basic activity of daily living) activities when pain and fatigue are at a minimum; pace activity to conserve energy.           Problem: Fall Injury Risk  Goal: Absence of Fall and Fall-Related Injury  Description: Provide a safe, barrier-free environment that encourages independent activity.    Keep care area uncluttered and well-lighted.    Determine need for increased observation or monitoring.    Avoid use of devices that minimize mobility, such as restraints or indwelling urinary catheter.           Problem: Oral Intake Inadequate  Goal: Improved Oral Intake  Description: Perform a nutrition assessment that includes a nutrition-focused physical exam; identify malnutrition risk.    Assess for adequate oral intake; if inadequate, offer oral supplemental food or drinks to enhance calorie and protein intake.    Assess for vitamin and mineral deficiencies; supplement if depleted.    Assess need and assist with with meal set-up and feeding.    Adjust diet or meal schedule based on preferences and tolerance.    Minimize unnecessary dietary restrictions to increase oral intake.           Problem: Skin Injury Risk Increased  Goal: Skin Health and Integrity  Description: Perform a full pressure injury risk assessment, as indicated by screening, upon admission to care unit.    Reassess skin (full inspection and injury risk, including skin temperature, consistency and color) frequently  (e.g., scheduled interval, with change in condition) to provide optimal early detection and prevention.    Maintain adequate tissue perfusion (e.g., encourage fluid balance; avoid crossing legs, constrictive clothing or devices) to promote tissue oxygenation.    Maintain head of bed at lowest degree of elevation tolerated, considering medical condition and other restrictions. Use positioning supports to prevent sliding and friction. Consider low friction textiles.

## 2023-08-26 ENCOUNTER — APPOINTMENT (OUTPATIENT)
Dept: OCCUPATIONAL THERAPY | Facility: SKILLED NURSING FACILITY | Age: 43
End: 2023-08-26
Attending: INTERNAL MEDICINE
Payer: COMMERCIAL

## 2023-08-26 LAB
BASOPHILS # BLD MANUAL: 0 10E3/UL (ref 0–0.2)
BASOPHILS NFR BLD MANUAL: 0 %
EOSINOPHIL # BLD MANUAL: 0 10E3/UL (ref 0–0.7)
EOSINOPHIL NFR BLD MANUAL: 0 %
ERYTHROCYTE [DISTWIDTH] IN BLOOD BY AUTOMATED COUNT: 13.2 % (ref 10–15)
HCT VFR BLD AUTO: 33.7 % (ref 40–53)
HGB BLD-MCNC: 11.2 G/DL (ref 13.3–17.7)
HOLD SPECIMEN: NORMAL
LYMPHOCYTES # BLD MANUAL: 0.6 10E3/UL (ref 0.8–5.3)
LYMPHOCYTES NFR BLD MANUAL: 3 %
MCH RBC QN AUTO: 28 PG (ref 26.5–33)
MCHC RBC AUTO-ENTMCNC: 33.2 G/DL (ref 31.5–36.5)
MCV RBC AUTO: 84 FL (ref 78–100)
MONOCYTES # BLD MANUAL: 0 10E3/UL (ref 0–1.3)
MONOCYTES NFR BLD MANUAL: 0 %
NEUTROPHILS # BLD MANUAL: 19.3 10E3/UL (ref 1.6–8.3)
NEUTROPHILS NFR BLD MANUAL: 97 %
PLAT MORPH BLD: ABNORMAL
PLATELET # BLD AUTO: 362 10E3/UL (ref 150–450)
RBC # BLD AUTO: 4 10E6/UL (ref 4.4–5.9)
RBC MORPH BLD: ABNORMAL
WBC # BLD AUTO: 19.9 10E3/UL (ref 4–11)

## 2023-08-26 PROCEDURE — 250N000013 HC RX MED GY IP 250 OP 250 PS 637: Performed by: INTERNAL MEDICINE

## 2023-08-26 PROCEDURE — 250N000011 HC RX IP 250 OP 636: Performed by: INTERNAL MEDICINE

## 2023-08-26 PROCEDURE — 120N000009 HC R&B SNF

## 2023-08-26 PROCEDURE — 99207 PR CDG-CUT & PASTE-POTENTIAL IMPACT ON LEVEL: CPT | Performed by: INTERNAL MEDICINE

## 2023-08-26 PROCEDURE — 250N000011 HC RX IP 250 OP 636: Mod: JZ | Performed by: PHYSICIAN ASSISTANT

## 2023-08-26 PROCEDURE — 36592 COLLECT BLOOD FROM PICC: CPT | Performed by: PHYSICIAN ASSISTANT

## 2023-08-26 PROCEDURE — 85027 COMPLETE CBC AUTOMATED: CPT | Performed by: PHYSICIAN ASSISTANT

## 2023-08-26 PROCEDURE — 99305 1ST NF CARE MODERATE MDM 35: CPT | Performed by: INTERNAL MEDICINE

## 2023-08-26 PROCEDURE — 97535 SELF CARE MNGMENT TRAINING: CPT | Mod: GO

## 2023-08-26 PROCEDURE — 250N000013 HC RX MED GY IP 250 OP 250 PS 637: Performed by: PHYSICIAN ASSISTANT

## 2023-08-26 PROCEDURE — 85007 BL SMEAR W/DIFF WBC COUNT: CPT | Performed by: PHYSICIAN ASSISTANT

## 2023-08-26 RX ORDER — POLYETHYLENE GLYCOL 3350 17 G/17G
17 POWDER, FOR SOLUTION ORAL DAILY PRN
Status: DISCONTINUED | OUTPATIENT
Start: 2023-08-26 | End: 2023-09-08 | Stop reason: HOSPADM

## 2023-08-26 RX ORDER — AMOXICILLIN 250 MG
1 CAPSULE ORAL 2 TIMES DAILY PRN
Status: DISCONTINUED | OUTPATIENT
Start: 2023-08-26 | End: 2023-09-08 | Stop reason: HOSPADM

## 2023-08-26 RX ORDER — PSYLLIUM SEED (WITH DEXTROSE)
2 POWDER (GRAM) ORAL DAILY PRN
Status: DISCONTINUED | OUTPATIENT
Start: 2023-08-26 | End: 2023-09-08 | Stop reason: HOSPADM

## 2023-08-26 RX ADMIN — HEPARIN, PORCINE (PF) 10 UNIT/ML INTRAVENOUS SYRINGE 10 ML: at 08:10

## 2023-08-26 RX ADMIN — OXYCODONE HYDROCHLORIDE 15 MG: 10 TABLET ORAL at 21:57

## 2023-08-26 RX ADMIN — Medication 25 MG: at 21:55

## 2023-08-26 RX ADMIN — OXYCODONE HYDROCHLORIDE 15 MG: 10 TABLET ORAL at 17:35

## 2023-08-26 RX ADMIN — ACETAMINOPHEN 975 MG: 325 TABLET, FILM COATED ORAL at 08:07

## 2023-08-26 RX ADMIN — ACETAMINOPHEN 975 MG: 325 TABLET, FILM COATED ORAL at 21:55

## 2023-08-26 RX ADMIN — HEPARIN, PORCINE (PF) 10 UNIT/ML INTRAVENOUS SYRINGE 5 ML: at 07:14

## 2023-08-26 RX ADMIN — OXYCODONE HYDROCHLORIDE 15 MG: 10 TABLET ORAL at 13:17

## 2023-08-26 RX ADMIN — ACETAMINOPHEN 975 MG: 325 TABLET, FILM COATED ORAL at 13:17

## 2023-08-26 RX ADMIN — ENOXAPARIN SODIUM 40 MG: 40 INJECTION SUBCUTANEOUS at 21:56

## 2023-08-26 RX ADMIN — METHOCARBAMOL 750 MG: 750 TABLET ORAL at 17:35

## 2023-08-26 RX ADMIN — OXYCODONE HYDROCHLORIDE 15 MG: 10 TABLET ORAL at 08:06

## 2023-08-26 RX ADMIN — PANTOPRAZOLE SODIUM 40 MG: 40 TABLET, DELAYED RELEASE ORAL at 08:06

## 2023-08-26 ASSESSMENT — ACTIVITIES OF DAILY LIVING (ADL)
ADLS_ACUITY_SCORE: 35

## 2023-08-26 NOTE — PROGRESS NOTES
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 Transferred to TCU on 8/24/23 with deconditioning for continued rehabilitation.      # Deconditioning   - PT/OT consult    # Acute pathologic L femoral neck fracture and acetabulum fracture  # S/p  CRPP of left femoral neck (8/14/23)   S/p CRPP of L femoral neck on 8/14/23 with Dr. Avila. No post operative complications.   - TTWB of LLE for transfers  - DVT ppx: Lovenox subcutaneous daily for 4-6 weeks, plan to reassess at that time with oncology to determine if need further ppx with underlying malignancy and mobility at that time   - Orthopedic surgery recommends repeat AP and Lateral Xray of L hip 2 weeks post op and again 6-8 weeks post op. Follow up with orthopedics TBD until patient completes chemotherapy, and could discuss surgical repair at that time      # Metastatic high grade chondroblastic osteosarcoma  Recently diagnosed osteosarcoma and PET CT concerning for metastatic disease to lungs. Admitted with acute pathologic fracture as noted above s/p repair. With pulmonary lesions, patient underwent bronchoscopy on 8/17 with pathology positive for metastatic osteosarcoma. While admitted oncology consulted and started chemotherapy with Doxorubicin/cisplatin 8/19-8/22 with plans for next cycle in 21  days in outpatient (next cycle 9/8/23), patient will need to determine if getting it at [facility] (Dr. Ellington) or back [place] with [place] oncologist (Dr. Palma). PICC line in place for chemotherapy, needing port in outpatient.   - Daily Filgrastim 5 mcg/kg/day for up to 14 days until ANC > 10,000 mm3 consistently               - Giving another dose today after discussion with oncology   - Trend CMP, and CBC with diff daily until reaches goal, then MWF  - Outpatient follow up with oncology (will need to determine if its will [facility] or with [facility] oncologist)   - Health Psychology consult to follow while at [facility]      # Acute on chronic Cancer related pain   - Continue fentanyl patch 50 mcg/72 hour patch, oxycodone 10-15 mg Q4H PRN, Tylenol 975 mg TID, robaxin 750 mg QID PRN, and atarax 25-50 mg TID PRN      # Sinus tachycardia - Unclear etiology but has been stable overall.  - Continue routine vitals    # Acute anemia - Normocytic anemia. Possible related to surgery and starting chemotherapy. Hgb recent trended and stable at 10-11.0s  - Trend CBC on M,W,F  - Transfuse if hgb goal > 7.0       # Transaminitis - ALT in 100s but down trending. Unclear etiology, possibly 2/2 chemotherapy. Trend MWF   # Elevated alk phos - likely related to osteosarcoma and fracture. Trend M,W,F     # Diarrhea-  Patient notes chronic diarrhea, that has worsened in the last day. No associated F/C, abdominal pain or blood stools. Hx of hemicolectomy. Likely 2/2 recent chemotherapy. Low suspicion for C. Diff at this time.   - PRN lomotil      Stable Medical conditions  # Stage II colorectal cancer s/p hemicolectomy now s/p re-anastomosis - Follows with Dr. Palma at [facility], last seen 8/7. Dx in 7/2021.  Underwent hemicolectomy 9/2021 followed by re-anastomosis in 12/2021     # Hx of Multiple Dural based extraaxial masses   # Hx of medulloblastoma s/p  shunt - Diagnosed at age 4. Underwent craniotomy with resection follow by radiation  and chemotherapy.  Has stable right sided brain masses with vasogenic edema unchanged from prior scans on CT c/w meningiomas being followed by Neurosurgery.          # BRCA1 positive - Being followed by Oncology for hx of cancer.      # Hx of hypothyroidism - Noted in chart. Per review, no abnormal TSH/T4 to fit with diagnosis. Not on pta medication     Resolved Medical Issues:  # Delirium ( Visual and Auditory Hallucinations)- resolved - Previously having visual and audio hallucinations this hospitalization. Improved with delirium precautions, pain control, and trazodone 25 mg at bedtime.   # Hyponatremia- Resovled  - Most likely SIADH vs malignancy related. Normalized. Trend BMP      The above was discuss with patient and and attending physician, Dr. Rebecca Killian who agrees with the above     CODE: FULL CODE  Diet: Regular diet  DVT: Lovenox   Indication for Psychotropic Medications: fentanyl, oxycodone, atarax, robaxin, trazodone at the lowest effective dose for management of pain and sleep  Pneumococcal Vaccination Status:  with recent chemotherapy, recommend delaying until off chemotherapy > 3 months.  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 Transferred to TCU on 8/24/23 with  deconditioning for continued rehabilitation.      Patient reports feeling fatigued, malaise, poor appetite and nausea today. Denies any vomiting, or abdominal pain. Denies any F/C, CP, SOB, diarrhea, urinary symptoms. Reports continued pain in his L hip currently at 6/10 in severity. States his pain medications are currently helping to control the pain with fentanyl patch and oxycodone. He does not feel that topical medications have been helpful in the past.  He is anxious to get back to [place].           Review of Systems:   A 10 point ROS was performed and negative unless otherwise noted in HPI.           Past Medical History:   I have reviewed this patient's medical history and updated it with pertinent information if needed.      BRAC1 mutation  Greene's esophagus  BPH  medulloblastoma s/p  shunt  rectal cancer diagnosed 2021 s/p hemicolectomy and capecitabine partial therapy  metastatic osteosarcoma          Past Surgical History:   I have reviewed this patient's surgical history and updated it with pertinent information if needed.        Past Surgical History:   Procedure Laterality Date    ENDOBRONCHIAL ULTRASOUND FLEXIBLE N/A 8/17/2023     Procedure: BRONCHOSCOPY,  WITH ENDOBRONCHIAL ULTRASOUND GUIDANCE;  Surgeon: Tomas London MD;  Location: [facility] OR    HIP PINNING Left 08/14/2023     Procedure: OPEN REDUCTION INTERNAL FIXATION LEFT FEMORAL NECK;  Surgeon: Dann Avila MD;  Location: [facility] OR    PICC DOUBLE LUMEN PLACEMENT Right 08/17/2023     5FR DL PICc, basilic vein. L-38cm, 2cm out.              Social History:      Social History           Tobacco Use    Smoking status: Never   Substance Use Topics    Alcohol use: Not Currently      . Lives in [place].           Family History:   I have reviewed this patient's family history and updated it with pertinent information if needed.         Family History   Problem Relation Age of Onset    Cancer Father      Diabetes Maternal Grandmother       Alzheimer Disease Paternal Grandfather             Allergies:            Allergies   Allergen Reactions    Capecitabine Other (See Comments)       Severe chest pain, lasted 5 days of taking med plus infusion    Seasonal Allergies Itching              Medications:      Prior to Admission Medications   Prescriptions Last Dose Informant Patient Reported? Taking?   acetaminophen (TYLENOL) 325 MG tablet     No No   Sig: Take 2 tablets (650 mg) by mouth every 4 hours as needed for other (For optimal non-opioid multimodal pain management to improve pain control.)   fentaNYL (DURAGESIC) 50 mcg/hr 72 hr patch     No No   Sig: Place 1 patch onto the skin every 72 hours remove old patch.   hydrOXYzine (ATARAX) 25 MG tablet     No No   Sig: Take 1 tablet (25 mg) by mouth every 6 hours as needed for other or anxiety (adjuvant pain)   hydrOXYzine (ATARAX) 50 MG tablet     No No   Sig: Take 1 tablet (50 mg) by mouth every 6 hours as needed for other or anxiety (adjuvant pain)   loratadine (CLARITIN) 10 MG tablet     Yes No   Sig: Take 10 mg by mouth daily as needed for allergies   methocarbamol (ROBAXIN) 750 MG tablet     No No   Sig: Take 1 tablet (750 mg) by mouth every 6 hours as needed for muscle spasms   oxyCODONE IR (ROXICODONE) 5 MG tablet     No No   Sig: Take 2-3 tablets (10-15 mg) by mouth every 4 hours as needed for moderate to severe pain   pantoprazole (PROTONIX) 40 MG EC tablet     No No   Sig: Take 1 tablet (40 mg) by mouth daily   polyethylene glycol (MIRALAX) 17 g packet     No No   Sig: Take 17 g by mouth daily   psyllium (METAMUCIL) WAFR     No No   Sig: Take 2 Wafers by mouth daily   senna-docusate (SENOKOT-S/PERICOLACE) 8.6-50 MG tablet     No No   Sig: Take 1 tablet by mouth 2 times daily   traZODone (DESYREL) 50 MG tablet     No No   Sig: Take 0.5 tablets (25 mg) by mouth At Bedtime      Facility-Administered Medications: None              Physical Exam:   Blood pressure 115/68, pulse 108, temperature 98.1   F (36.7  C), temperature source Oral, resp. rate 18, weight 72.6 kg (160 lb 0.9 oz), SpO2 95 %.  General Appearance: Alert and awake, answering questions appropriately. Chronically ill appearing.   HEENT: Anicteric sclera, MMM   Respiratory: Breathing comfortably on room air, lungs CTAB without wheezing or crackles   Cardiovascular: RRR, S1, S2. No murmur noted  GI: Abdomen soft, non-tender with active bowel sounds. No guarding or rebound  Lymph/Hematologic: No peripheral edema  Skin: No rash or jaundice on expose skin of face, arms or legs. Dressing to L hip C/D/I.   Musculoskeletal: Moves all extremities   Neurologic: No focal deficits, CN II-XII grossly intact.

## 2023-08-26 NOTE — PLAN OF CARE
Patient is alert and oriented  x 4.  Able to communicate needs. VSS on RA. Denies any cough, chest pain, SOB, dificulty breathing, lightheadedness or dizziness. No nausea or vomiting. Denies any chills or fever. Regular diet, thin liquids. Takes meds whole.. Assist of 1 with GB & walker Continent of bowel & bladder.  DL PICC line intact/patent, NS flushed & heparinized. Pain comfortably manageable 15 mg PRN oxycodone  x 2. Call light in reach.     Patient's most recent vital signs are:     Vital signs:  BP: 114/77  Temp: 98.6  HR: 116  RR: 18  SpO2: 95 %     Patient does not have new respiratory symptoms.  Patient does not have new sore throat.  Patient does not have a fever greater than 99.5.

## 2023-08-26 NOTE — H&P
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 Transferred to TCU on 8/24/23 with deconditioning for continued rehabilitation.     # Deconditioning   - PT/OT consult    # Acute pathologic L femoral neck fracture and acetabulum fracture  # S/p  CRPP of left femoral neck (8/14/23)   S/p CRPP of L femoral neck on 8/14/23 with Dr. Avila. No post operative complications.   - TTWB of LLE for transfers  - DVT ppx: Lovenox subcutaneous daily for 4-6 weeks, plan to reassess at that time with oncology to determine if need further ppx with underlying malignancy and mobility at that time   - Orthopedic surgery recommends repeat AP and Lateral Xray of L hip 2 weeks post op and again 6-8 weeks post op. Follow up with orthopedics TBD until patient completes chemotherapy, and could discuss surgical repair at that time     # Metastatic high grade chondroblastic osteosarcoma  Recently diagnosed osteosarcoma and PET CT concerning for metastatic disease to lungs. Admitted with acute pathologic fracture as noted above s/p repair. With pulmonary lesions, patient underwent bronchoscopy on 8/17 with pathology positive for metastatic osteosarcoma. While admitted oncology consulted and started chemotherapy with Doxorubicin/cisplatin 8/19-8/22 with plans for next cycle in 21 days  in outpatient (next cycle 9/8/23), patient will need to determine if getting it at [facility] (Dr. Ellington) or back [place] with outpatient oncologist (Dr. Palma). PICC line in place for chemotherapy, needing port in outpatient.   - Daily Filgrastim 5 mcg/kg/day for up to 14 days until ANC > 10,000 mm3 consistently   - Giving another dose today after discussion with oncology   - Trend CMP, and CBC with diff daily until reaches goal, then MWF  - Outpatient follow up with oncology (will need to determine if its will [facility] or with [facility] oncologist)   - Health Psychology consult to follow while at [facility]     # Acute on chronic Cancer related pain   - Continue fentanyl patch 50 mcg/72 hour patch, oxycodone 10-15 mg Q4H PRN, Tylenol 975 mg TID, robaxin 750 mg QID PRN, and atarax 25-50 mg TID PRN     # Sinus tachycardia - Unclear etiology but has been stable overall.  - Continue routine vitals    # Acute anemia - Normocytic anemia. Possible related to surgery and starting chemotherapy. Hgb recent trended and stable at 10-11.0s  - Trend CBC on M,W,F  - Transfuse if hgb goal > 7.0      # Transaminitis - ALT in 100s but down trending. Unclear etiology, possibly 2/2 chemotherapy. Trend MWF   # Elevated alk phos - likely related to osteosarcoma and fracture. Trend M,W,F    # Diarrhea-  Patient notes chronic diarrhea, that has worsened in the last day. No associated F/C, abdominal pain or blood stools. Hx of hemicolectomy. Likely 2/2 recent chemotherapy. Low suspicion for C. Diff at this time.   - PRN lomotil     Stable Medical conditions  # Stage II colorectal cancer s/p hemicolectomy now s/p re-anastomosis - Follows with Dr. Palma at [facility], last seen 8/7. Dx in 7/2021.  Underwent hemicolectomy 9/2021 followed by re-anastomosis in 12/2021    # Hx of Multiple Dural based extraaxial masses   # Hx of medulloblastoma s/p  shunt - Diagnosed at age 4. Underwent craniotomy with resection follow by radiation and chemotherapy.  Has  stable right sided brain masses with vasogenic edema unchanged from prior scans on CT c/w meningiomas being followed by Neurosurgery.          # BRCA1 positive - Being followed by Oncology for hx of cancer.      # Hx of hypothyroidism - Noted in chart. Per review, no abnormal TSH/T4 to fit with diagnosis. Not on pta medication    Resolved Medical Issues:  # Delirium ( Visual and Auditory Hallucinations)- resolved - Previously having visual and audio hallucinations this hospitalization. Improved with delirium precautions, pain control, and trazodone 25 mg at bedtime.   # Hyponatremia- Resovled  - Most likely SIADH vs malignancy related. Normalized. Trend BMP       CODE: FULL CODE  Diet: Regular diet  DVT: Lovenox   Indication for Psychotropic Medications: fentanyl, oxycodone, atarax, robaxin, trazodone at the lowest effective dose for management of pain and sleep  Pneumococcal Vaccination Status:  with recent chemotherapy, recommend delaying until off chemotherapy > 3 months.  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 Transferred to TCU on 8/24/23 with deconditioning for continued rehabilitation.     Patient  reports feeling fatigued, malaise, poor appetite and nausea today. Denies any vomiting, or abdominal pain. Denies any F/C, CP, SOB, diarrhea, urinary symptoms. Reports continued pain in his L hip currently at 6/10 in severity. States his pain medications are currently helping to control the pain with fentanyl patch and oxycodone. He does not feel that topical medications have been helpful in the past.  He is anxious to get back to [place].          Review of Systems:   A 10 point ROS was performed and negative unless otherwise noted in HPI.          Past Medical History:   I have reviewed this patient's medical history and updated it with pertinent information if needed.     BRAC1 mutation  Greene's esophagus  BPH  medulloblastoma s/p  shunt  rectal cancer diagnosed 2021 s/p hemicolectomy and capecitabine partial therapy  metastatic osteosarcoma         Past Surgical History:   I have reviewed this patient's surgical history and updated it with pertinent information if needed.  Past Surgical History:   Procedure Laterality Date    ENDOBRONCHIAL ULTRASOUND FLEXIBLE N/A 8/17/2023    Procedure: BRONCHOSCOPY,  WITH ENDOBRONCHIAL ULTRASOUND GUIDANCE;  Surgeon: Tomas London MD;  Location: [facility] OR    HIP PINNING Left 08/14/2023    Procedure: OPEN REDUCTION INTERNAL FIXATION LEFT FEMORAL NECK;  Surgeon: Dann Avila MD;  Location: [facility] OR    PICC DOUBLE LUMEN PLACEMENT Right 08/17/2023    5FR DL PICc, basilic vein. L-38cm, 2cm out.            Social History:     Social History     Tobacco Use    Smoking status: Never   Substance Use Topics    Alcohol use: Not Currently     . Lives in [place].          Family History:   I have reviewed this patient's family history and updated it with pertinent information if needed.   Family History   Problem Relation Age of Onset    Cancer Father     Diabetes Maternal Grandmother     Alzheimer Disease Paternal Grandfather           Allergies:      Allergies   Allergen  Reactions    Capecitabine Other (See Comments)     Severe chest pain, lasted 5 days of taking med plus infusion    Seasonal Allergies Itching            Medications:     Prior to Admission Medications   Prescriptions Last Dose Informant Patient Reported? Taking?   acetaminophen (TYLENOL) 325 MG tablet   No No   Sig: Take 2 tablets (650 mg) by mouth every 4 hours as needed for other (For optimal non-opioid multimodal pain management to improve pain control.)   fentaNYL (DURAGESIC) 50 mcg/hr 72 hr patch   No No   Sig: Place 1 patch onto the skin every 72 hours remove old patch.   hydrOXYzine (ATARAX) 25 MG tablet   No No   Sig: Take 1 tablet (25 mg) by mouth every 6 hours as needed for other or anxiety (adjuvant pain)   hydrOXYzine (ATARAX) 50 MG tablet   No No   Sig: Take 1 tablet (50 mg) by mouth every 6 hours as needed for other or anxiety (adjuvant pain)   loratadine (CLARITIN) 10 MG tablet   Yes No   Sig: Take 10 mg by mouth daily as needed for allergies   methocarbamol (ROBAXIN) 750 MG tablet   No No   Sig: Take 1 tablet (750 mg) by mouth every 6 hours as needed for muscle spasms   oxyCODONE IR (ROXICODONE) 5 MG tablet   No No   Sig: Take 2-3 tablets (10-15 mg) by mouth every 4 hours as needed for moderate to severe pain   pantoprazole (PROTONIX) 40 MG EC tablet   No No   Sig: Take 1 tablet (40 mg) by mouth daily   polyethylene glycol (MIRALAX) 17 g packet   No No   Sig: Take 17 g by mouth daily   psyllium (METAMUCIL) WAFR   No No   Sig: Take 2 Wafers by mouth daily   senna-docusate (SENOKOT-S/PERICOLACE) 8.6-50 MG tablet   No No   Sig: Take 1 tablet by mouth 2 times daily   traZODone (DESYREL) 50 MG tablet   No No   Sig: Take 0.5 tablets (25 mg) by mouth At Bedtime      Facility-Administered Medications: None               Physical Exam:   Blood pressure 114/77, pulse 116, temperature 98.6  F (37  C), temperature source Oral, resp. rate 18, weight 72.6 kg (160 lb 0.9 oz), SpO2 95 %.  General Appearance: Alert  and awake, answering questions appropriately. Chronically ill appearing.   HEENT: Anicteric sclera, MMM   Respiratory: Breathing comfortably on room air, lungs CTAB without wheezing or crackles   Cardiovascular: RRR, S1, S2. No murmur noted  GI: Abdomen soft, non-tender with active bowel sounds. No guarding or rebound  Lymph/Hematologic: No peripheral edema  Skin: No rash or jaundice on expose skin of face, arms or legs. Dressing to L hip C/D/I.   Musculoskeletal: Moves all extremities   Neurologic: No focal deficits, CN II-XII grossly intact.

## 2023-08-26 NOTE — PROGRESS NOTES
Brief Medicine Note    Vitals and labs reviewed. Leukocytosis today with ANC >10 at 19.3. Likely 2/2 G-CSF yesterday. Will discontinue as he is above goal. Trend CBC with diff MWF. Remainder of plan per H&P.     Tatiana Grissom [facility]

## 2023-08-26 NOTE — PLAN OF CARE
Goal Outcome Evaluation:    A/O x 4, afebrile, denied SOB, left hip pain managed with Oxycodone PRN given x 1, effective, left arm with Fentanyl Patch x 1.  Rt. Arm PICC flushed, heparin locked . Left hip with intact small primapore dressing. No acute issues during this shift. Will continue POC.     Patient's most recent vital signs are:     Vital signs:  BP: 114/71  Temp: 98.8  HR: 119  RR: 18  SpO2: 97 %     Patient does not have new respiratory symptoms.  Patient does not have new sore throat.  Patient does not have a fever greater than 99.5.

## 2023-08-26 NOTE — PLAN OF CARE
Goal Outcome Evaluation:        No acute issues during this shift. VSS, denies SOB, left hip pain managed with Oxycodone PRN x2, Robaxin given as requested also, effective. Fair appetite, said he can't eat a lot since he had chemo. Rt. Arm PICC CDI. No loose BM's this shift. Comfortable at this time, will continue POC.       Patient's most recent vital signs are:     Vital signs:  BP: 109/64  Temp: 98.2  HR: 117  RR: 16  SpO2: 97 %     Patient does not have new respiratory symptoms.  Patient does not have new sore throat.  Patient does not have a fever greater than 99.5.

## 2023-08-26 NOTE — PLAN OF CARE
Goal Outcome Evaluation:    Pt is alert and oriented x 4 can make need know, fentanyl patch on left arm is on.  Pt denies SOB, CP, and no n/v .Right arm double lumen ,PICC is patent, flushed. Dressing is CDI , on combined regular diet, continent of B&B , assist of 1 with walker and gait belt, urinal by bed side , takes pill whole, non bearing weight on the left leg, no acute issue on this shift, call light, within reach, continue with plan of care.          Patient's most recent vital signs are:     Vital signs:  BP: 114/71  Temp: 98.8  HR: 119  RR: 18  SpO2: 97 %     Patient does not have new respiratory symptoms.  Patient does not have new sore throat.  Patient does not have a fever greater than 99.5.

## 2023-08-27 ENCOUNTER — APPOINTMENT (OUTPATIENT)
Dept: PHYSICAL THERAPY | Facility: SKILLED NURSING FACILITY | Age: 43
End: 2023-08-27
Attending: INTERNAL MEDICINE
Payer: COMMERCIAL

## 2023-08-27 PROCEDURE — 97530 THERAPEUTIC ACTIVITIES: CPT | Mod: GP | Performed by: PHYSICAL THERAPIST

## 2023-08-27 PROCEDURE — 250N000011 HC RX IP 250 OP 636: Performed by: INTERNAL MEDICINE

## 2023-08-27 PROCEDURE — 97110 THERAPEUTIC EXERCISES: CPT | Mod: GP | Performed by: PHYSICAL THERAPIST

## 2023-08-27 PROCEDURE — 250N000011 HC RX IP 250 OP 636: Mod: JZ | Performed by: PHYSICIAN ASSISTANT

## 2023-08-27 PROCEDURE — 120N000009 HC R&B SNF

## 2023-08-27 PROCEDURE — 250N000013 HC RX MED GY IP 250 OP 250 PS 637: Performed by: INTERNAL MEDICINE

## 2023-08-27 PROCEDURE — 250N000013 HC RX MED GY IP 250 OP 250 PS 637: Performed by: PHYSICIAN ASSISTANT

## 2023-08-27 RX ADMIN — HEPARIN, PORCINE (PF) 10 UNIT/ML INTRAVENOUS SYRINGE 10 ML: at 08:52

## 2023-08-27 RX ADMIN — ACETAMINOPHEN 975 MG: 325 TABLET, FILM COATED ORAL at 21:21

## 2023-08-27 RX ADMIN — HEPARIN, PORCINE (PF) 10 UNIT/ML INTRAVENOUS SYRINGE 10 ML: at 23:53

## 2023-08-27 RX ADMIN — ENOXAPARIN SODIUM 40 MG: 40 INJECTION SUBCUTANEOUS at 21:21

## 2023-08-27 RX ADMIN — Medication 25 MG: at 21:21

## 2023-08-27 RX ADMIN — OXYCODONE HYDROCHLORIDE 15 MG: 10 TABLET ORAL at 13:14

## 2023-08-27 RX ADMIN — OXYCODONE HYDROCHLORIDE 15 MG: 10 TABLET ORAL at 17:17

## 2023-08-27 RX ADMIN — OXYCODONE HYDROCHLORIDE 15 MG: 10 TABLET ORAL at 21:21

## 2023-08-27 RX ADMIN — ACETAMINOPHEN 975 MG: 325 TABLET, FILM COATED ORAL at 13:14

## 2023-08-27 RX ADMIN — FENTANYL 1 PATCH: 50 PATCH TRANSDERMAL at 08:52

## 2023-08-27 RX ADMIN — ACETAMINOPHEN 975 MG: 325 TABLET, FILM COATED ORAL at 08:53

## 2023-08-27 RX ADMIN — METHOCARBAMOL 750 MG: 750 TABLET ORAL at 17:17

## 2023-08-27 RX ADMIN — METHOCARBAMOL 750 MG: 750 TABLET ORAL at 08:53

## 2023-08-27 RX ADMIN — PANTOPRAZOLE SODIUM 40 MG: 40 TABLET, DELAYED RELEASE ORAL at 08:53

## 2023-08-27 RX ADMIN — OXYCODONE HYDROCHLORIDE 15 MG: 10 TABLET ORAL at 08:53

## 2023-08-27 ASSESSMENT — ACTIVITIES OF DAILY LIVING (ADL)
ADLS_ACUITY_SCORE: 35
ADLS_ACUITY_SCORE: 41
ADLS_ACUITY_SCORE: 35
ADLS_ACUITY_SCORE: 41
ADLS_ACUITY_SCORE: 35
ADLS_ACUITY_SCORE: 35

## 2023-08-27 NOTE — PLAN OF CARE
Goal Outcome Evaluation:    Pt is alert and oriented x 4 can make needs know, denies SOB, CP, and no n/v. Right arm PICC  dressing is CDI. No acute issue on this shift, pt was comfortable in bed during safety rounds, urinal by bed side ,call light within reach continue with plan of care.           Patient's most recent vital signs are:     Vital signs:  BP: 109/64  Temp: 98.2  HR: 117  RR: 16  SpO2: 97 %     Patient does not have new respiratory symptoms.  Patient does not have new sore throat.  Patient does not have a fever greater than 99.5.

## 2023-08-27 NOTE — PLAN OF CARE
Patient is alert and oriented  x 4.  Able to communicate needs. VSS on RA. Denies any cough, chest pain, SOB, dificulty breathing, lightheadedness or dizziness. No nausea or vomiting. Denies any chills or fever. Regular diet, thin liquids. Takes meds whole. Assist of 1 with GB & walker. Continent of bowel & bladder.  DL PICC line intact/patent. Dressing changed per protocol. Pain comfortably manageable, PRN oxycodone 15 mg given x2. Robaxin x 1. Call light in reach.         Patient's most recent vital signs are:     Vital signs:  BP: 125/75  Temp: 98.6  HR: 101  RR: 16  SpO2: 96 %     Patient does not have new respiratory symptoms.  Patient does not have new sore throat.  Patient does not have a fever greater than 99.5.

## 2023-08-28 ENCOUNTER — APPOINTMENT (OUTPATIENT)
Dept: OCCUPATIONAL THERAPY | Facility: SKILLED NURSING FACILITY | Age: 43
End: 2023-08-28
Attending: INTERNAL MEDICINE
Payer: COMMERCIAL

## 2023-08-28 ENCOUNTER — APPOINTMENT (OUTPATIENT)
Dept: PHYSICAL THERAPY | Facility: SKILLED NURSING FACILITY | Age: 43
End: 2023-08-28
Attending: INTERNAL MEDICINE
Payer: COMMERCIAL

## 2023-08-28 LAB
ALBUMIN SERPL BCG-MCNC: 2.9 G/DL (ref 3.5–5.2)
ALP SERPL-CCNC: 185 U/L (ref 40–129)
ALT SERPL W P-5'-P-CCNC: 36 U/L (ref 0–70)
ANION GAP SERPL CALCULATED.3IONS-SCNC: 8 MMOL/L (ref 7–15)
AST SERPL W P-5'-P-CCNC: 19 U/L (ref 0–45)
BASOPHILS # BLD MANUAL: 0 10E3/UL (ref 0–0.2)
BASOPHILS NFR BLD MANUAL: 1 %
BILIRUB SERPL-MCNC: 0.3 MG/DL
BUN SERPL-MCNC: 17.9 MG/DL (ref 6–20)
CALCIUM SERPL-MCNC: 8.9 MG/DL (ref 8.6–10)
CHLORIDE SERPL-SCNC: 99 MMOL/L (ref 98–107)
CREAT SERPL-MCNC: 0.96 MG/DL (ref 0.67–1.17)
DEPRECATED HCO3 PLAS-SCNC: 28 MMOL/L (ref 22–29)
EOSINOPHIL # BLD MANUAL: 0 10E3/UL (ref 0–0.7)
EOSINOPHIL NFR BLD MANUAL: 1 %
ERYTHROCYTE [DISTWIDTH] IN BLOOD BY AUTOMATED COUNT: 13.2 % (ref 10–15)
FRAGMENTS BLD QL SMEAR: SLIGHT
GFR SERPL CREATININE-BSD FRML MDRD: >90 ML/MIN/1.73M2
GLUCOSE SERPL-MCNC: 91 MG/DL (ref 70–99)
HCT VFR BLD AUTO: 29.1 % (ref 40–53)
HGB BLD-MCNC: 9.8 G/DL (ref 13.3–17.7)
LYMPHOCYTES # BLD MANUAL: 1.1 10E3/UL (ref 0.8–5.3)
LYMPHOCYTES NFR BLD MANUAL: 53 %
MAGNESIUM SERPL-MCNC: 2.1 MG/DL (ref 1.7–2.3)
MCH RBC QN AUTO: 28.2 PG (ref 26.5–33)
MCHC RBC AUTO-ENTMCNC: 33.7 G/DL (ref 31.5–36.5)
MCV RBC AUTO: 84 FL (ref 78–100)
MONOCYTES # BLD MANUAL: 0 10E3/UL (ref 0–1.3)
MONOCYTES NFR BLD MANUAL: 1 %
NEUTROPHILS # BLD MANUAL: 0.9 10E3/UL (ref 1.6–8.3)
NEUTROPHILS NFR BLD MANUAL: 44 %
PLAT MORPH BLD: ABNORMAL
PLATELET # BLD AUTO: 211 10E3/UL (ref 150–450)
POTASSIUM SERPL-SCNC: 4 MMOL/L (ref 3.4–5.3)
PROT SERPL-MCNC: 5.5 G/DL (ref 6.4–8.3)
RBC # BLD AUTO: 3.48 10E6/UL (ref 4.4–5.9)
RBC MORPH BLD: ABNORMAL
SODIUM SERPL-SCNC: 135 MMOL/L (ref 136–145)
WBC # BLD AUTO: 2.1 10E3/UL (ref 4–11)

## 2023-08-28 PROCEDURE — 85027 COMPLETE CBC AUTOMATED: CPT | Performed by: PHYSICIAN ASSISTANT

## 2023-08-28 PROCEDURE — 97110 THERAPEUTIC EXERCISES: CPT | Mod: GP

## 2023-08-28 PROCEDURE — 120N000009 HC R&B SNF

## 2023-08-28 PROCEDURE — 250N000013 HC RX MED GY IP 250 OP 250 PS 637: Performed by: PHYSICIAN ASSISTANT

## 2023-08-28 PROCEDURE — 250N000011 HC RX IP 250 OP 636: Mod: JZ | Performed by: PHYSICIAN ASSISTANT

## 2023-08-28 PROCEDURE — 80053 COMPREHEN METABOLIC PANEL: CPT | Performed by: PHYSICIAN ASSISTANT

## 2023-08-28 PROCEDURE — 97535 SELF CARE MNGMENT TRAINING: CPT | Mod: GO

## 2023-08-28 PROCEDURE — 85007 BL SMEAR W/DIFF WBC COUNT: CPT | Performed by: PHYSICIAN ASSISTANT

## 2023-08-28 PROCEDURE — 83735 ASSAY OF MAGNESIUM: CPT | Performed by: NURSE PRACTITIONER

## 2023-08-28 PROCEDURE — 250N000011 HC RX IP 250 OP 636: Performed by: INTERNAL MEDICINE

## 2023-08-28 PROCEDURE — 36415 COLL VENOUS BLD VENIPUNCTURE: CPT | Performed by: PHYSICIAN ASSISTANT

## 2023-08-28 PROCEDURE — 250N000013 HC RX MED GY IP 250 OP 250 PS 637: Performed by: INTERNAL MEDICINE

## 2023-08-28 RX ADMIN — OXYCODONE HYDROCHLORIDE 10 MG: 10 TABLET ORAL at 21:38

## 2023-08-28 RX ADMIN — PANTOPRAZOLE SODIUM 40 MG: 40 TABLET, DELAYED RELEASE ORAL at 07:52

## 2023-08-28 RX ADMIN — ACETAMINOPHEN 975 MG: 325 TABLET, FILM COATED ORAL at 21:37

## 2023-08-28 RX ADMIN — HEPARIN, PORCINE (PF) 10 UNIT/ML INTRAVENOUS SYRINGE 10 ML: at 07:52

## 2023-08-28 RX ADMIN — ENOXAPARIN SODIUM 40 MG: 40 INJECTION SUBCUTANEOUS at 21:38

## 2023-08-28 RX ADMIN — Medication 25 MG: at 21:37

## 2023-08-28 RX ADMIN — ACETAMINOPHEN 975 MG: 325 TABLET, FILM COATED ORAL at 14:52

## 2023-08-28 RX ADMIN — ACETAMINOPHEN 975 MG: 325 TABLET, FILM COATED ORAL at 07:52

## 2023-08-28 RX ADMIN — OXYCODONE HYDROCHLORIDE 10 MG: 10 TABLET ORAL at 07:56

## 2023-08-28 ASSESSMENT — ACTIVITIES OF DAILY LIVING (ADL)
ADLS_ACUITY_SCORE: 41
ADLS_ACUITY_SCORE: 39
ADLS_ACUITY_SCORE: 41
ADLS_ACUITY_SCORE: 39

## 2023-08-28 NOTE — PROGRESS NOTES
CLINICAL NUTRITION SERVICES - BRIEF NOTE     Nutrition Prescription    Recommendations already ordered by Registered Dietitian (RD):  Discontinued Magic Cup.  Continue Ensure Clear.    Future/Additional Recommendations:  Monitor intake, supplement preferences.   See 8/25 RD note for full assessment.    EVALUATION OF THE PROGRESS TOWARD GOALS   Diet: Regular  - Magic Cup with lunch  - Ensure Clear at 2 pm  - Supplements PRN     NEW FINDINGS   Met with pt to discuss supplements per staff member request. He reports that he just wanted to try the Magic Cup, he didn't care for it. He sips on the Ensure Clear, drinks 1 a day. His wife brings in beef jerky that he snacks on because he knows he needs protein. She also brings other food from outside the hospital. He drinks lots of water. His taste still isn't there, but this has been an ongoing thing with chemo. He's been able to taste spicy foods.     INTERVENTIONS  Medical food supplement therapy    Monitoring/Evaluation  Progress toward goals will be monitored and evaluated per protocol.     Alpesh Brown RD, LD  TCU/OB/5[facility] GUNNAR pager: [phone]

## 2023-08-28 NOTE — PLAN OF CARE
Goal Outcome Evaluation:    No acute issues during this shift. VSS, denies SOB, left hip pain managed with Oxycodone PRN x 2, Robaxin given as requested also, effective, Fentanyl patch to right am .Rt. Arm PICC CDI. No loose BM's this shift, last BM: 8/26. Comfortable at this time, will continue POC.           Patient's most recent vital signs are:     Vital signs:  BP: 116/72  Temp: 99  HR: 107  RR: 17  SpO2: 95 %     Patient does not have new respiratory symptoms.  Patient does not have new sore throat.  Patient does not have a fever greater than 99.5.

## 2023-08-28 NOTE — PROGRESS NOTES
Brief Medicine Note   28 August 2023       Spoke w/ Dr. Reid re: sharp decrease in WBC and ANC.  No need to start GCSF today unless febrile.  Will continue to monitor closely.     Criselda Sandoval CNP, APRN  Internal Medicine [facility]  Pager [phone]

## 2023-08-28 NOTE — PLAN OF CARE
Goal Outcome Evaluation:    Patient is alert and oriented x 4. Able to make needs known. Pt denied SOB, N/V and chest pain. Left PICC line is patent with intact dressing, and heparin locked. Pt is continent of bowel and bladder with commode at bed side. Pt is assist of 1 with walker, gait belt and TTWB.  Pt requested for PRN Oxycodone for left hip pain.Wound dressing on left hip is clean and intact. Continue to monitor per plan of care.    Patient's most recent vital signs are:     Vital signs:  BP: 114/81  Temp: 98.1  HR: 115  RR: 18  SpO2: 94 %     Patient does not have new respiratory symptoms.  Patient does not have new sore throat.  Patient does not have a fever greater than 99.5.

## 2023-08-28 NOTE — PLAN OF CARE
Goal Outcome Evaluation:    Pt is alert and oriented x 4 , can make needs known, pleasant with cares. Double lumen PICC flushed with NS  and locked with heparin. Pt denies SOB, CP, and no n/v. Pt denies pain, Urinal by bed side , no acute issues on this shift, call light within reach , continue with plan of care.          Patient's most recent vital signs are:     Vital signs:  BP: 116/72  Temp: 99  HR: 107  RR: 17  SpO2: 95 %     Patient does not have new respiratory symptoms.  Patient does not have new sore throat.  Patient does not have a fever greater than 99.5.

## 2023-08-28 NOTE — PROGRESS NOTES
MDS Pain Assessment    The following is the pain interview as conducted by the TCU RN caring for the patient on August / 28 / 2023. This assessment is required by the [facility] for all patients in [place] SNF (Skilled Nursing Facilities).       1. Have you had pain or hurting at any time in the last 5 days? Yes    2. How much of the time have you experienced pain or hurting over the last 5 days? almost constantly    3. Over the past 5 days, has pain made it hard for you to sleep at night? Yes    4. Over the past 5 days, have you limited your day-to-day activities because of pain? Yes    5. Pain intensity (Numeric scale used first-if patient unable to answer, verbal scale to be used.)    Numeric Scale: Please rate your worst pain over the last 5 days on a zero to ten scale, with zero being no pain and ten being the worst pain you can imagine.     5    Verbal Scale: USED ONLY if unable to give numeric, otherwise N/A  Please rate the intensity of your worst pain over the last 5 days.     moderate-severe

## 2023-08-28 NOTE — PROGRESS NOTES
VA[facility] cut and paste the places the Hospital SW and CHW made.  SW will call and follow up in each of them .    Referrals Placed by CM/SW:       [facility]  [address] [facility] [address]  P: [phone], Press 2 (ADDENDUM) F: [phone]  8/23: Initial referral sent via hard fax   8/24: CHW was given the wrong fax number by the  yesterday, so Admissions gave the correct number to CHW so that they can receive the fax and review the referral. CHW sent the new fax and will be waiting for a call back  8/28: first time called, message said admissions can't take your call and hung up on SW.  SW called again and this time, it was sent to someone's VM.  SW left a vm and requested a call back.  Moira called back and is considering him.  Will call back tomorrow.     [facility]  [address] [facility] [address]  P: [phone], F: [phone]  8/23: Initial referral sent via hard fax   8/24: Admissions staff all out for training the rest of the day. CHW left a voicemail asking for updates upon their return.  8/25 Jesse COTTER left a vm and asked for a return call. Nelly said she didn't see referral.  SW sent another referral.  She said she would maybe have something by end of week.  VAHE said that would work.  Anything. They are considering him also.     [facility] [address] [facility] [address]  P: [phone], F: [phone]  8/23: Initial referral sent via hard fax   8/24: Admissions did not answer, so CHW left a voicemail asking for referral status and call back.  8/28: SW left a vm asking for a return call.    [facility]  [address], [place] [address]  P: [phone], F: [phone]  8/23: Initial referral sent via hard fax   8/24: Admissions did not answer, so CHW left a voicemail asking for referral status and call back.  8/28:  [facility] [phone]  Sarah COTTER left a  asking for a return call.     Denied   [facility]  [address] [facility]  [address]  P: [phone], F: [phone]  8/23: Initial referral sent via hard fax   8/24: Admissions did not answer, so CHW left a voicemail asking for referral status and call back.  8/28: [phone] [facility] SW left a vm asking for a return call.   Tamar called back and said they aren't contracted with [employer] out of state.     [facility]  [address], [place] [address]  P: [phone], F: [phone]  8/23: Initial referral sent via hard fax   8/24: Admissions staff are all out for training the rest of the day and tomorrow, per . CHW left a voicemail asking for updates upon their return.  8/28: [facility]  no beds available for sometime.     BELLE Alvarez   [facility], Transitional Care Unit   Social Work   [facility] 4th [place] [address]  () [phone]

## 2023-08-29 ENCOUNTER — APPOINTMENT (OUTPATIENT)
Dept: OCCUPATIONAL THERAPY | Facility: SKILLED NURSING FACILITY | Age: 43
End: 2023-08-29
Attending: INTERNAL MEDICINE
Payer: COMMERCIAL

## 2023-08-29 ENCOUNTER — APPOINTMENT (OUTPATIENT)
Dept: PHYSICAL THERAPY | Facility: SKILLED NURSING FACILITY | Age: 43
End: 2023-08-29
Attending: INTERNAL MEDICINE
Payer: COMMERCIAL

## 2023-08-29 PROCEDURE — 97530 THERAPEUTIC ACTIVITIES: CPT | Mod: GP

## 2023-08-29 PROCEDURE — 250N000013 HC RX MED GY IP 250 OP 250 PS 637: Performed by: INTERNAL MEDICINE

## 2023-08-29 PROCEDURE — 250N000011 HC RX IP 250 OP 636: Performed by: INTERNAL MEDICINE

## 2023-08-29 PROCEDURE — 250N000011 HC RX IP 250 OP 636: Mod: JZ | Performed by: PHYSICIAN ASSISTANT

## 2023-08-29 PROCEDURE — 250N000013 HC RX MED GY IP 250 OP 250 PS 637: Performed by: PHYSICIAN ASSISTANT

## 2023-08-29 PROCEDURE — 97535 SELF CARE MNGMENT TRAINING: CPT | Mod: GO

## 2023-08-29 PROCEDURE — 97110 THERAPEUTIC EXERCISES: CPT | Mod: GP

## 2023-08-29 PROCEDURE — 97110 THERAPEUTIC EXERCISES: CPT | Mod: GO

## 2023-08-29 PROCEDURE — 120N000009 HC R&B SNF

## 2023-08-29 RX ADMIN — ACETAMINOPHEN 975 MG: 325 TABLET, FILM COATED ORAL at 20:58

## 2023-08-29 RX ADMIN — PANTOPRAZOLE SODIUM 40 MG: 40 TABLET, DELAYED RELEASE ORAL at 07:59

## 2023-08-29 RX ADMIN — Medication 25 MG: at 20:58

## 2023-08-29 RX ADMIN — ACETAMINOPHEN 975 MG: 325 TABLET, FILM COATED ORAL at 07:58

## 2023-08-29 RX ADMIN — METHOCARBAMOL 750 MG: 750 TABLET ORAL at 14:13

## 2023-08-29 RX ADMIN — OXYCODONE HYDROCHLORIDE 15 MG: 10 TABLET ORAL at 14:13

## 2023-08-29 RX ADMIN — HEPARIN, PORCINE (PF) 10 UNIT/ML INTRAVENOUS SYRINGE 10 ML: at 07:58

## 2023-08-29 RX ADMIN — ENOXAPARIN SODIUM 40 MG: 40 INJECTION SUBCUTANEOUS at 20:58

## 2023-08-29 RX ADMIN — METHOCARBAMOL 750 MG: 750 TABLET ORAL at 07:59

## 2023-08-29 RX ADMIN — ACETAMINOPHEN 975 MG: 325 TABLET, FILM COATED ORAL at 14:12

## 2023-08-29 RX ADMIN — OXYCODONE HYDROCHLORIDE 15 MG: 10 TABLET ORAL at 07:59

## 2023-08-29 ASSESSMENT — ACTIVITIES OF DAILY LIVING (ADL)
ADLS_ACUITY_SCORE: 41
ADLS_ACUITY_SCORE: 39
ADLS_ACUITY_SCORE: 39
ADLS_ACUITY_SCORE: 41
ADLS_ACUITY_SCORE: 39
ADLS_ACUITY_SCORE: 41
ADLS_ACUITY_SCORE: 39
ADLS_ACUITY_SCORE: 41

## 2023-08-29 NOTE — PROGRESS NOTES
Covering VAHE followed up on the following referrals:     [facility]  [address] [facility] [address]  P: [phone], Press 2 F: [phone]  8/23: Initial referral sent via hard fax   8/24: CHW was given the wrong fax number by the  yesterday, so Admissions gave the correct number to CHW so that they can receive the fax and review the referral. CHW sent the new fax and will be waiting for a call back  8/28: first time called, message said admissions can't take your call and hung up on SW.  SW called again and this time, it was sent to someone's VM.  VAHE left a vm and requested a call back.  Moira called back and is considering him.  Will call back tomorrow.   8/29: VA[facility] received a call from Moira - she let SW know they would need to submit authorization to [facility] (which could take up to 2 days) and would likely not hear back until Tuesday, 9/5 due to the holiday weekend. Moira told SW that they are not able to accommodate outpatient chemo, and with pt's next start date as 9/8, pt would only be at their facility for two days. If pt's chemo start date is pushed, they can start the authorization process. VAHE will follow up with provider and patient.     [facility]  [address] [facility] [address]  P: [phone], F: [phone]  8/23: Initial referral sent via hard fax   8/24: Admissions staff all out for training the rest of the day. CHW left a voicemail asking for updates upon their return.  8/25 Jesse COTTER left a vm and asked for a return call. Nelly said she didn't see referral.  SW sent another referral.  She said she would maybe have something by end of week.  SW said that would work.  Anything. They are considering him also.      [facility]  [address] [facility] [address]  P: [phone], F: [phone]  8/23: Initial referral sent via hard fax   8/24: Admissions did not answer, so CHW left a voicemail asking for referral status and call back.  8/28:  [facility]  [phone]  Sarah COTTER left a vm asking for a return call.   8/29: SW was given the phone number for the SNF ([phone]) and left a voicemail.     [facility]  [address] [facility] [place] [address]  P: [phone], F: [phone]  8/23: Initial referral sent via hard fax   8/24: Admissions did not answer, so CHW left a voicemail asking for referral status and call back.  8/28: [phone] [facility] SW left a vm asking for a return call.   Tamar called back and said they aren't contracted with [employer] out of state.      [facility]  [address] [place] [address]  P: [phone], F: [phone]  8/23: Initial referral sent via hard fax   8/24: Admissions staff are all out for training the rest of the day and tomorrow, per . CHW left a voicemail asking for updates upon their return.  8/28: [facility]  no beds available for sometime.     [facility] [address] [facility] [address]  P: [phone], F: [phone]  8/23: Initial referral sent via hard fax   8/24: Admissions did not answer, so CHW left a voicemail asking for referral status and call back.  8/28: SW left a vm asking for a return call.  8/29: No beds available    BELLE Cassidy  Post Acute Float   [facility]/XIMENA/MANUEL    Phone: [phone]  Fax: [phone]

## 2023-08-29 NOTE — PLAN OF CARE
Goal Outcome Evaluation:  No acute issues overnight. Awake initial rounds - had no c/o's or requests @ that time. Stated he would call if he needed anything. Subsequent rounds - sleeping well. Call light within reach.        Patient's most recent vital signs are:     Vital signs:  BP: 113/75  Temp: 98.1  HR: 117  RR: 18  SpO2: 96 %     Patient does not have new respiratory symptoms.  Patient does not have new sore throat.  Patient does not have a fever greater than 99.5.

## 2023-08-29 NOTE — PROGRESS NOTES
08/29/23 1100   Name of Certified Therapeutic Rec Specialist   Name of Certified Therapeutic Rec Specialist LELA Zimmer   Appointment Type   Type of Therapeutic Rec Session Therapeutic Rec Assessment   General Information   Patient Profile Review See Profile for full history and prior level of function   Daily Contact with Relatives or Friends Phone call;Visit   Pets Dog   Community Involvement   Community Involvement Currently employed   Drives Yes   Spiritual Practice Adventism;Not connected/interested   Outings Dinner;Concerts   Music   Listens to Recorded Music Yes  (usually only at work)   Brought Own Equipment Yes   Media   Computer Has own at home;Will use tablet/phone   TV / Movies TV;Other (comments)  (has multiple streaming services on his tablet)   Video Games XBox   Sports / Physical Activities   Outdoor Activities Lawn / yard care   Impression   Open to Socializing with Others Independent   Barriers to Leisure Mobility;Endurance   Patient, family and / or staff in agreement with Plan of Care Yes   Treatment Plan   Interested in Unit [place]? No   Type of Intervention Independent with activity   Equipment and Supplies While on Unit None needed   Assessment Assessment completed, pt was oriented to role of rec therapy and provided with leisure resource list. Pt has a tablet with all of his leisure materials on it, streaming movies, music, etc. No needs at this time. Will monitor pt and provide materials as needed.

## 2023-08-29 NOTE — PLAN OF CARE
Goal Outcome Evaluation:  No acute issues during this shift . VSS, afebrile, denied SOB, pain managed with scheduled Tylenol, Oxycodone PRN and Robaxin PRN, Fentanyl patch to right arm. Left hip small incision approximated (LUPE) .Rt. Arm PICC CDI , heparin locked. Continent of bowel/ bladder, had BM this shift. Comfortable at this time, will continue POC.     Patient's most recent vital signs are:     Vital signs:  BP: 129/76  Temp: 96.3  HR: 106  RR: 16  SpO2: 95 %     Patient does not have new respiratory symptoms.  Patient does not have new sore throat.  Patient does not have a fever greater than 99.5.

## 2023-08-29 NOTE — PLAN OF CARE
Goal Outcome Evaluation:    Patient is alert and oriented x 4. Able to make needs known. Pt denied SOB, N/V and chest pain. Left PICC line is patent with intact dressing, and heparin locked. Pt is continent of bowel and bladder with commode at bed side. Pt is assist of 1 with walker, gait belt and TTWB.  Wound dressing on left hip is clean and intact. Pt requested for PRN Oxycodone for left hip pain. Continue to monitor per plan of care.    Patient's most recent vital signs are:     Vital signs:  BP: 113/75  Temp: 98.1  HR: 117  RR: 18  SpO2: 96 %     Patient does not have new respiratory symptoms.  Patient does not have new sore throat.  Patient does not have a fever greater than 99.5.

## 2023-08-30 ENCOUNTER — APPOINTMENT (OUTPATIENT)
Dept: PHYSICAL THERAPY | Facility: SKILLED NURSING FACILITY | Age: 43
End: 2023-08-30
Attending: INTERNAL MEDICINE
Payer: COMMERCIAL

## 2023-08-30 ENCOUNTER — APPOINTMENT (OUTPATIENT)
Dept: OCCUPATIONAL THERAPY | Facility: SKILLED NURSING FACILITY | Age: 43
End: 2023-08-30
Attending: INTERNAL MEDICINE
Payer: COMMERCIAL

## 2023-08-30 LAB
ALBUMIN SERPL BCG-MCNC: 3 G/DL (ref 3.5–5.2)
ALP SERPL-CCNC: 158 U/L (ref 40–129)
ALT SERPL W P-5'-P-CCNC: 27 U/L (ref 0–70)
ANION GAP SERPL CALCULATED.3IONS-SCNC: 7 MMOL/L (ref 7–15)
AST SERPL W P-5'-P-CCNC: 17 U/L (ref 0–45)
BASOPHILS # BLD AUTO: 0 10E3/UL (ref 0–0.2)
BASOPHILS NFR BLD AUTO: 1 %
BILIRUB SERPL-MCNC: 0.3 MG/DL
BUN SERPL-MCNC: 14.4 MG/DL (ref 6–20)
CALCIUM SERPL-MCNC: 9.1 MG/DL (ref 8.6–10)
CHLORIDE SERPL-SCNC: 101 MMOL/L (ref 98–107)
CREAT SERPL-MCNC: 0.71 MG/DL (ref 0.67–1.17)
DEPRECATED HCO3 PLAS-SCNC: 29 MMOL/L (ref 22–29)
EOSINOPHIL # BLD AUTO: 0 10E3/UL (ref 0–0.7)
EOSINOPHIL NFR BLD AUTO: 1 %
ERYTHROCYTE [DISTWIDTH] IN BLOOD BY AUTOMATED COUNT: 12.8 % (ref 10–15)
GFR SERPL CREATININE-BSD FRML MDRD: >90 ML/MIN/1.73M2
GLUCOSE SERPL-MCNC: 101 MG/DL (ref 70–99)
HCT VFR BLD AUTO: 26.5 % (ref 40–53)
HGB BLD-MCNC: 8.9 G/DL (ref 13.3–17.7)
IMM GRANULOCYTES # BLD: 0 10E3/UL
IMM GRANULOCYTES NFR BLD: 0 %
LYMPHOCYTES # BLD AUTO: 0.7 10E3/UL (ref 0.8–5.3)
LYMPHOCYTES NFR BLD AUTO: 67 %
MCH RBC QN AUTO: 28.2 PG (ref 26.5–33)
MCHC RBC AUTO-ENTMCNC: 33.6 G/DL (ref 31.5–36.5)
MCV RBC AUTO: 84 FL (ref 78–100)
MONOCYTES # BLD AUTO: 0 10E3/UL (ref 0–1.3)
MONOCYTES NFR BLD AUTO: 3 %
NEUTROPHILS # BLD AUTO: 0.3 10E3/UL (ref 1.6–8.3)
NEUTROPHILS NFR BLD AUTO: 28 %
NRBC # BLD AUTO: 0 10E3/UL
NRBC BLD AUTO-RTO: 4 /100
PLATELET # BLD AUTO: 129 10E3/UL (ref 150–450)
POTASSIUM SERPL-SCNC: 4.1 MMOL/L (ref 3.4–5.3)
PROT SERPL-MCNC: 5.6 G/DL (ref 6.4–8.3)
RBC # BLD AUTO: 3.16 10E6/UL (ref 4.4–5.9)
SODIUM SERPL-SCNC: 137 MMOL/L (ref 136–145)
WBC # BLD AUTO: 1.1 10E3/UL (ref 4–11)

## 2023-08-30 PROCEDURE — 80053 COMPREHEN METABOLIC PANEL: CPT | Performed by: PHYSICIAN ASSISTANT

## 2023-08-30 PROCEDURE — 99309 SBSQ NF CARE MODERATE MDM 30: CPT | Performed by: NURSE PRACTITIONER

## 2023-08-30 PROCEDURE — 85025 COMPLETE CBC W/AUTO DIFF WBC: CPT | Performed by: PHYSICIAN ASSISTANT

## 2023-08-30 PROCEDURE — 250N000011 HC RX IP 250 OP 636: Performed by: INTERNAL MEDICINE

## 2023-08-30 PROCEDURE — 36592 COLLECT BLOOD FROM PICC: CPT | Performed by: PHYSICIAN ASSISTANT

## 2023-08-30 PROCEDURE — 250N000013 HC RX MED GY IP 250 OP 250 PS 637: Performed by: PHYSICIAN ASSISTANT

## 2023-08-30 PROCEDURE — 97530 THERAPEUTIC ACTIVITIES: CPT | Mod: GP | Performed by: PHYSICAL THERAPIST

## 2023-08-30 PROCEDURE — 250N000013 HC RX MED GY IP 250 OP 250 PS 637: Performed by: INTERNAL MEDICINE

## 2023-08-30 PROCEDURE — 120N000009 HC R&B SNF

## 2023-08-30 PROCEDURE — 97535 SELF CARE MNGMENT TRAINING: CPT | Mod: GO

## 2023-08-30 PROCEDURE — 250N000011 HC RX IP 250 OP 636: Mod: JZ | Performed by: PHYSICIAN ASSISTANT

## 2023-08-30 RX ADMIN — HEPARIN, PORCINE (PF) 10 UNIT/ML INTRAVENOUS SYRINGE 5 ML: at 06:45

## 2023-08-30 RX ADMIN — Medication 25 MG: at 20:15

## 2023-08-30 RX ADMIN — METHOCARBAMOL 750 MG: 750 TABLET ORAL at 20:15

## 2023-08-30 RX ADMIN — FENTANYL 1 PATCH: 50 PATCH TRANSDERMAL at 07:49

## 2023-08-30 RX ADMIN — OXYCODONE HYDROCHLORIDE 15 MG: 10 TABLET ORAL at 20:15

## 2023-08-30 RX ADMIN — ACETAMINOPHEN 975 MG: 325 TABLET, FILM COATED ORAL at 20:14

## 2023-08-30 RX ADMIN — PANTOPRAZOLE SODIUM 40 MG: 40 TABLET, DELAYED RELEASE ORAL at 07:49

## 2023-08-30 RX ADMIN — ENOXAPARIN SODIUM 40 MG: 40 INJECTION SUBCUTANEOUS at 20:14

## 2023-08-30 RX ADMIN — ACETAMINOPHEN 975 MG: 325 TABLET, FILM COATED ORAL at 13:29

## 2023-08-30 RX ADMIN — OXYCODONE HYDROCHLORIDE 15 MG: 10 TABLET ORAL at 13:29

## 2023-08-30 RX ADMIN — ACETAMINOPHEN 975 MG: 325 TABLET, FILM COATED ORAL at 07:38

## 2023-08-30 RX ADMIN — HEPARIN, PORCINE (PF) 10 UNIT/ML INTRAVENOUS SYRINGE 10 ML: at 07:40

## 2023-08-30 ASSESSMENT — ACTIVITIES OF DAILY LIVING (ADL)
ADLS_ACUITY_SCORE: 41

## 2023-08-30 NOTE — PROGRESS NOTES
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 Transferred to TCU on 8/24/23 with deconditioning for continued rehabilitation.     # Deconditioning  # Acute pathologic L femoral neck fracture and acetabulum fracture   # S/p CRPP of L femoral neck (8/14/23 w/ Dr. Avila)  - PT, OT consults   - TTWB of LLE for transfers  - DVT ppx: Lovenox subcutaneous daily for 4-6 weeks, plan to reassess at that time with Oncology to determine if need further ppx with underlying malignancy and mobility at that time   - Orthopedic surgery recommends repeat AP and lateral XR of L hip 2 weeks post op and again 6-8 weeks post op; ordered for today 8/30 and next XR should be scheduled around 9/25-10/9  - Follow up with Orthopedics TBD until patient completes chemotherapy, and could discuss surgical repair at that time     # Metastatic high grade chondroblastic osteosarcoma   # Neutropenia   Recently diagnosed osteosarcoma and PET CT concerning for metastatic disease to lungs. Admitted with acute pathologic fracture as noted above s/p repair. With pulmonary lesions, patient underwent bronchoscopy on 8/17 with pathology positive for metastatic osteosarcoma. While admitted oncology consulted and started chemotherapy with Doxorubicin/cisplatin 8/19-8/22 with plans for next cycle in 21 days in outpatient (next cycle would be due 9/8/23).  Patient would  like to resume his chemo at home in [place] with Dr. Palma, his previously established oncologist. WBC 1.1 this AM, ANC 0.3.  Paged out to Heme/Onc, awaiting guidance on starting infection ppx.    - Neutropenic precautions   - CBC w/ diff MWF   - Discussed with Dr. Palma's office in [place] regarding plant for subsequent chemotherapy and if his recent surgery on 8/14 will delay upcoming cycle due on 9/8; the team will connect with us again for recommendations tomorrow AM 8/31  - Currently has PICC line in place for chemo, will need port placed as outpatient     # Acute on chronic cancer related pain   Continue fentanyl patch 50 mcg/72 hour patch, oxycodone 10-15mg Q4H PRN, Tylenol 975 mg TID, robaxin 750 mg QID PRN, and Atarax 25-50 mg TID PRN      # Sinus tachycardia - Unclear etiology.  HR usually between 100-mid 110s.    - Continue routine vitals     # Acute anemia, normocytic - Likely 2/2 underlying cancer/metastatic disease, surgery, recent initiation of chemotherapy. Hgb has been stable 10-11s.   - Monitor CBC MWF   - Transfuse for Hgb < 7     # Elevated LFTs - Chronically elevated AP, though has been trending down.  ALT initially elevated, now normalized.  AST, bilirubin wnl.   - Monitor     # Diarrhea - Hx of hemicolectomy. Likely 2/2 recent chemotherapy. Low suspicion for C. Diff at this time.   - PRN lomotil     Stable/chronic medical conditions:  # Stage II colorectal cancer s/p hemicolectomy now s/p re-anastomosis - Follows with Dr. Palma at [facility], last seen 8/7. Dx in 7/2021.  Underwent hemicolectomy 9/2021 followed by re-anastomosis in 12/2021.     # Hx of Multiple Dural based extraaxial masses   # Hx of medulloblastoma s/p  shunt   Diagnosed at age 4. Underwent craniotomy with resection follow by radiation and chemotherapy.  Has stable right sided brain masses with vasogenic edema unchanged from prior scans on CT c/w meningiomas.  Follows w/ Neurosurgery.           # BRCA1 positive -  Oncology follow up in [place].      # Questionable hx hypothyroidism - Noted in chart. Per review, no abnormal TSH/T4 to fit with diagnosis. TSH checked on 8/14, wnl.  Not on PTA medication.    Resolved Problems:   # Delirium (visual and auditory hallucinations) - Resolved. Occurred during hospitalization. Improved with delirium precautions, pain control, and trazodone 25 mg at bedtime. No complaints thus far at TCU.    # Hyponatremia - Resolved.  Most likely SIADH vs malignancy related. Normalized. Trend BMP        CODE: FULL   DVT: Lovenox   Diet: Regular   Indications for Psychotropic Medications: fentanyl, oxycodone, atarax, robaxin, trazodone at the lowest effective dose for management of pain and sleep   Disposition: TBD      Criselda Sandoval, CNP, APRN  Internal Medicine [facility] Hospitalist  [facility]  Pager [phone]    ________________________________________________________________    Subjective & Interval History:      Sarbjit is seen in his room.  He is resting in bed.  Anxious to return home to [place] to be near family and familiar surroundings.  He reports ongoing left leg pain but knows that he will likely not be pain free.  Denies dyspnea and chest pain.  Appetite is fair.  No vomiting or abdominal pain.       Last 24 hour care team notes reviewed.   ROS: 4 point ROS (including Respiratory, CV, GI and ) was performed and negative unless otherwise noted in HPI.     Medications: Reviewed in EPIC.    Physical Exam:    Blood pressure 122/76, pulse 107, temperature 98.8  F (37.1  C), temperature source Oral, resp. rate 18, weight 72.6 kg (160 lb 0.9 oz), SpO2 94 %.    GENERAL: Alert and oriented x 3. Well nourished, well developed.  No acute distress.    HEENT: Normocephalic, atraumatic. Anicteric sclera. Mucous membranes moist.   CV: RRR. S1, S2. No murmurs appreciated.   RESPIRATORY: Effort normal on room air. Lungs CTAB with no wheezing, rales, or rhonchi.   GI: Abdomen soft and non distended, bowel  sounds present x all 4 quadrants. No tenderness, rebound, or guarding.   NEUROLOGICAL: No focal deficits. Follows commands.    MUSCULOSKELETAL: No joint swelling or tenderness. Moves all extremities.   EXTREMITIES: No gross deformities. No peripheral edema.   SKIN: Grossly warm, dry, and intact. No jaundice. No rashes.       Lines/Tubes/Drains:   PICC 08/17/23 Double Lumen Right Basilic Chemotherapy. PICC okay to use. (Active)   Site Assessment WDL 08/30/23 0738   External Cath Length (cm) 2 cm 08/27/23 1500   Extremity Circumference (cm) 29 cm 08/17/23 1634   Dressing Chlorhexidine disk;Transparent 08/30/23 0738   Dressing Status clean;dry;intact 08/30/23 0738   Dressing Intervention dressing changed 08/27/23 1500   Dressing Change Due 09/03/23 08/27/23 1900   Line Necessity Yes, meets criteria 08/30/23 0738   Purple - Status heparin locked 08/30/23 0738   Purple - Cap Change Due 09/03/23 08/29/23 0800   Purple - Intervention Flushed;Cap change 08/30/23 0738   Red - Status heparin locked 08/30/23 0738   Red - Cap Change Due 09/03/23 08/29/23 0800   Red - Intervention Flushed;Cap change 08/30/23 0738   Phlebitis Scale 0-->no symptoms 08/29/23 0800   Infiltration? no 08/29/23 0800   PICC Comment CDI 08/27/23 1900   Number of days: 13           ROUTINE IP LABS (Last four results)  CMP   Recent Labs   Lab 08/30/23  0655 08/28/23  0600 08/25/23  0722 08/24/23  0404    135* 136 137   POTASSIUM 4.1 4.0 4.1 3.8   CHLORIDE 101 99 98 99   CO2 29 28 29 26   ANIONGAP 7 8 9 12   * 91 97 93   BUN 14.4 17.9 20.0 22.0*   CR 0.71 0.96 0.76 0.80   JESSICA 9.1 8.9 9.3 9.0   MAG  --  2.1  --   --    PROTTOTAL 5.6* 5.5* 6.2* 6.1*   ALBUMIN 3.0* 2.9* 3.2* 3.3*   BILITOTAL 0.3 0.3 0.5 0.6   ALKPHOS 158* 185* 297* 299*   AST 17 19 30 40   ALT 27 36 85* 104*     CBC   Recent Labs   Lab 08/30/23  0655 08/28/23  0600 08/26/23  0725 08/25/23  0722   WBC 1.1* 2.1* 19.9* 11.0   RBC 3.16* 3.48* 4.00* 4.13*   HGB 8.9* 9.8* 11.2* 11.6*    HCT 26.5* 29.1* 33.7* 34.8*   MCV 84 84 84 84   MCH 28.2 28.2 28.0 28.1   MCHC 33.6 33.7 33.2 33.3   RDW 12.8 13.2 13.2 13.3   * 211 362 368     INR No lab results found in last 7 days.

## 2023-08-30 NOTE — PLAN OF CARE
Patient is alert  and oriented x4, reported pain 8/10 this shift. Pain managed with Fentanyl patch, scheduled medications, and PRN Oxy. Pain management effective per patient report. Transfers assist of 1 with gait belt and walker. Right double lumen PICC heparin locked. Patient is able to make needs known and uses the call light appropriately. Continue with the plan of care.    /76 (BP Location: Left arm)   Pulse 107   Temp 98.8  F (37.1  C) (Oral)   Resp 18   Wt 72.6 kg (160 lb 0.9 oz)   SpO2 94%   BMI 28.72 kg/m

## 2023-08-30 NOTE — DISCHARGE INSTRUCTIONS
Primary Care Provider  You are scheduled to see Dr. Booth on 9/15/2023 at 11:30am.    Address [address] [facility] [address]     Phone   [phone]     If you need to go to a facility:   KEYONNA alberto/[phone]  They will accept you in between CHEMO. Just ask for another referral.      Home Health   [facility]-[phone]  Lka-[phone]  Nurse, physical therapy, and occupational therapy.   -You will get a call after you have discharged from Transitional care unit to schedule the first home care visit. The home health nurse should come out within the first 24-48 hours. If you don't get a call from them within the first 48 hours, please call to follow up (number above).     Follow up with orthopedics TBD until you complete chemotherapy   Outpatient follow up with oncology - in [place]/ [place]  - [phone]

## 2023-08-30 NOTE — PLAN OF CARE
Goal Outcome Evaluation:  No acute issues overnight. Has been sleeping throughout shift and has no c/o's or requests as of yet. Continent B&B, uses urinal indep.- staff empties, LBM yesterday.        Patient's most recent vital signs are:     Vital signs:  BP: 120/78  Temp: 98.3  HR: 111  RR: 16  SpO2: 98 %     Patient does not have new respiratory symptoms.  Patient does not have new sore throat.  Patient does not have a fever greater than 99.5.

## 2023-08-30 NOTE — PLAN OF CARE
Goal Outcome Evaluation:         Pt alert and oriented X4, able to make needs known. No c/o chest pain, SOB, N/V. No PRN given/requested this shift. Pt had a shower this evening, fentanyl patch was stuck on plastic cover, retrieved by RN and replaced on right upper arm. R PICC dressing CDI. VSS. Will continue with POC.          Patient's most recent vital signs are:     Vital signs:  BP: 120/78  Temp: 98.3  HR: 111  RR: 16  SpO2: 98 %     Patient does not have new respiratory symptoms.  Patient does not have new sore throat.  Patient does not have a fever greater than 99.5.

## 2023-08-30 NOTE — PROGRESS NOTES
VAHE met with pt and wife/Shagufta to discuss discharge. Only [facility] facility in [place] has agreed to take pt but only if no Chemo. Pt and wife decided to to stay here for another week. then go directly home.  PA is waiting for call from Oncology as to when next Chemo treatment is.  They will call in the am.  If it is on the 8th, then he will go sooner.  If it's later then we will look at him going home on the 7th or 8th. We will check on Tuesday to see how safe he feels.  He is worried about falling.  That will make the difference between the 7th or 8th. but no other admit to TCU.  Directly home from here.   Pt is working on stairs in therapy.  Pt's house has three levels.  Wife is working with people at home to figure out how to make stairs safer for pt. Pt would like walker, commode and toilet riser. Shagufta said pt received Home Health from [facility] before.  SW will try them.     VAHE called [facility] [phone]  They will accept you in between CHEMO. Just ask for another referral.   [facility] said they do this all the time.     BELLE Alvarez   [facility], Transitional Care Unit   Social Work   [facility]th [place] [address]  (PH) [phone]

## 2023-08-31 ENCOUNTER — APPOINTMENT (OUTPATIENT)
Dept: PHYSICAL THERAPY | Facility: SKILLED NURSING FACILITY | Age: 43
End: 2023-08-31
Attending: INTERNAL MEDICINE
Payer: COMMERCIAL

## 2023-08-31 ENCOUNTER — APPOINTMENT (OUTPATIENT)
Dept: OCCUPATIONAL THERAPY | Facility: SKILLED NURSING FACILITY | Age: 43
End: 2023-08-31
Attending: INTERNAL MEDICINE
Payer: COMMERCIAL

## 2023-08-31 PROCEDURE — 250N000011 HC RX IP 250 OP 636: Performed by: INTERNAL MEDICINE

## 2023-08-31 PROCEDURE — 97535 SELF CARE MNGMENT TRAINING: CPT | Mod: GO

## 2023-08-31 PROCEDURE — 250N000013 HC RX MED GY IP 250 OP 250 PS 637: Performed by: PHYSICIAN ASSISTANT

## 2023-08-31 PROCEDURE — 97530 THERAPEUTIC ACTIVITIES: CPT | Mod: GP | Performed by: PHYSICAL THERAPIST

## 2023-08-31 PROCEDURE — 250N000013 HC RX MED GY IP 250 OP 250 PS 637: Performed by: INTERNAL MEDICINE

## 2023-08-31 PROCEDURE — 120N000009 HC R&B SNF

## 2023-08-31 PROCEDURE — 97110 THERAPEUTIC EXERCISES: CPT | Mod: GO

## 2023-08-31 PROCEDURE — 250N000011 HC RX IP 250 OP 636: Mod: JZ | Performed by: STUDENT IN AN ORGANIZED HEALTH CARE EDUCATION/TRAINING PROGRAM

## 2023-08-31 PROCEDURE — 250N000011 HC RX IP 250 OP 636: Mod: JZ | Performed by: PHYSICIAN ASSISTANT

## 2023-08-31 RX ADMIN — OXYCODONE HYDROCHLORIDE 15 MG: 10 TABLET ORAL at 14:00

## 2023-08-31 RX ADMIN — Medication 25 MG: at 20:39

## 2023-08-31 RX ADMIN — ACETAMINOPHEN 975 MG: 325 TABLET, FILM COATED ORAL at 08:39

## 2023-08-31 RX ADMIN — HEPARIN, PORCINE (PF) 10 UNIT/ML INTRAVENOUS SYRINGE 5 ML: at 08:39

## 2023-08-31 RX ADMIN — OXYCODONE HYDROCHLORIDE 15 MG: 10 TABLET ORAL at 08:39

## 2023-08-31 RX ADMIN — OXYCODONE HYDROCHLORIDE 10 MG: 10 TABLET ORAL at 20:38

## 2023-08-31 RX ADMIN — FILGRASTIM-AAFI 300 MCG: 300 INJECTION, SOLUTION INTRAVENOUS; SUBCUTANEOUS at 22:31

## 2023-08-31 RX ADMIN — ACETAMINOPHEN 975 MG: 325 TABLET, FILM COATED ORAL at 14:01

## 2023-08-31 RX ADMIN — ACETAMINOPHEN 975 MG: 325 TABLET, FILM COATED ORAL at 20:39

## 2023-08-31 RX ADMIN — PANTOPRAZOLE SODIUM 40 MG: 40 TABLET, DELAYED RELEASE ORAL at 08:39

## 2023-08-31 RX ADMIN — ENOXAPARIN SODIUM 40 MG: 40 INJECTION SUBCUTANEOUS at 20:40

## 2023-08-31 ASSESSMENT — ACTIVITIES OF DAILY LIVING (ADL)
ADLS_ACUITY_SCORE: 41

## 2023-08-31 NOTE — CARE PLAN
Shift: 1900 - 0730    VS: /82 (BP Location: Left arm)   Pulse 101   Temp 98.1  F (36.7  C) (Oral)   Resp 18   Wt 72.6 kg (160 lb 0.9 oz)   SpO2 97%   BMI 28.72 kg/m     O2: SpO2 > 95%  and stable on RA. Denies chest pain and SOB.    Output: Continent of bowels and bladder. Voids spontaneously without difficulty to bathroom. Uses beside urinal    Last BM: 8/30, denies abdominal discomfort. BS active / passing flatus.    Activity: Up with assist of 1 with gait belt and walker   Skin: WDL except, Lt. Hip incisional site   Pain: Pain managed with oxycodone   CMS: Intact, AOx4. Denies numbness and tingling.   Dressing: LUPE   Diet: Regular diet. Take pills whole with thin liquid. Denies nausea/vomiting.   LDA: Double lumen Right Basilic  Chemo PICC, Lt. Hip incisional site   Equipment: IV pole, personal belongings,    Plan: Call light within reach, pt able to make needs known.  No acute change this shift. Continue with plan of care.   Additional Info: Neutropenic  precautions Toe touch weight bearing

## 2023-08-31 NOTE — PLAN OF CARE
"Goal Outcome Evaluation:    VS: /78 (BP Location: Left arm)   Pulse 102   Temp 98.3  F (36.8  C) (Oral)   Resp 18   Wt 72.6 kg (160 lb 0.9 oz)   SpO2 98%   BMI 28.72 kg/m     O2: RA   Output: Urinal; staff empties, good output   Last BM: 8/30   Activity: Therapy; up in w/c; family visiting  LLE TTWB   Skin: L hip fxr   L lateral hand/pinky blister; intact; Mepilex placed for \"P\"   Pain: Oxy Q4H x2  Left shoulder Fentanyl patch in place   CMS:  Neuro: RUE numbness; new, possibly DT chemo  A&O   Dressing: LUE Mepilex, CDI   Diet: Reg   LDA: R PICC x2   Equipment: WW, ARTHUR, w/c    Plan: Con't POC.    Additional Info: Neutropenic & chemo precautions in place     Patient's most recent vital signs are:     Vital signs:  BP: 130/78  Temp: 98.3  HR: 102  RR: 18  SpO2: 98 %     Patient does not have new respiratory symptoms.  Patient does not have new sore throat.  Patient does not have a fever greater than 99.5.    "

## 2023-08-31 NOTE — PROGRESS NOTES
Brief Medicine Note   31 August 2023       Discussed with Heme/Onc yesterday afternoon 8/30 and this AM 8/31.  Plan is to start Filgrastim 300mcg subcutaneous starting today for 3 days.  No ppx antibiotics/antiinfectives at this time.      Update: Spoke with LIVIER Kirkpatrick who is part of pt's Oncology team w/ Dr. Palma w/ [facility] back home in [place], [place].  Currently the plan is for patient to be seen there on 9/11.  He is scheduled for labs at 8:15am, then visit with Dr. Palma, then after will have chemo.  They are aware that he does not have a port and will be coming with his PICC.          Criselda Sandoval, CNP, APRN  Internal Medicine [facility]  Pager [phone]

## 2023-08-31 NOTE — PROGRESS NOTES
VAHE was updated by PA about next chemo date.    VAHE met with pt and wife.  They are okay with going home on the 8th.   VAHE called [facility].  They did work with pt in '21.  VAHE faxed referral to [phone].  They thought they could accept him.      BELLE Alvaerz   [facility], Transitional Care Unit   Social Work   [facility] [place] [address]  () [phone]

## 2023-09-01 ENCOUNTER — APPOINTMENT (OUTPATIENT)
Dept: PHYSICAL THERAPY | Facility: SKILLED NURSING FACILITY | Age: 43
End: 2023-09-01
Attending: INTERNAL MEDICINE
Payer: COMMERCIAL

## 2023-09-01 ENCOUNTER — APPOINTMENT (OUTPATIENT)
Dept: OCCUPATIONAL THERAPY | Facility: SKILLED NURSING FACILITY | Age: 43
End: 2023-09-01
Attending: INTERNAL MEDICINE
Payer: COMMERCIAL

## 2023-09-01 LAB
ALBUMIN SERPL BCG-MCNC: 2.7 G/DL (ref 3.5–5.2)
ALP SERPL-CCNC: 135 U/L (ref 40–129)
ALT SERPL W P-5'-P-CCNC: 20 U/L (ref 0–70)
ANION GAP SERPL CALCULATED.3IONS-SCNC: 5 MMOL/L (ref 7–15)
AST SERPL W P-5'-P-CCNC: 14 U/L (ref 0–45)
BASOPHILS # BLD MANUAL: 0 10E3/UL (ref 0–0.2)
BASOPHILS NFR BLD MANUAL: 2 %
BILIRUB SERPL-MCNC: 0.2 MG/DL
BUN SERPL-MCNC: 13.4 MG/DL (ref 6–20)
CALCIUM SERPL-MCNC: 9.2 MG/DL (ref 8.6–10)
CHLORIDE SERPL-SCNC: 103 MMOL/L (ref 98–107)
CREAT SERPL-MCNC: 0.73 MG/DL (ref 0.67–1.17)
DEPRECATED HCO3 PLAS-SCNC: 30 MMOL/L (ref 22–29)
EOSINOPHIL # BLD MANUAL: 0 10E3/UL (ref 0–0.7)
EOSINOPHIL NFR BLD MANUAL: 0 %
ERYTHROCYTE [DISTWIDTH] IN BLOOD BY AUTOMATED COUNT: 12.3 % (ref 10–15)
GFR SERPL CREATININE-BSD FRML MDRD: >90 ML/MIN/1.73M2
GLUCOSE SERPL-MCNC: 94 MG/DL (ref 70–99)
HCT VFR BLD AUTO: 25.2 % (ref 40–53)
HGB BLD-MCNC: 8.5 G/DL (ref 13.3–17.7)
LYMPHOCYTES # BLD MANUAL: 0.7 10E3/UL (ref 0.8–5.3)
LYMPHOCYTES NFR BLD MANUAL: 61 %
MCH RBC QN AUTO: 28.1 PG (ref 26.5–33)
MCHC RBC AUTO-ENTMCNC: 33.7 G/DL (ref 31.5–36.5)
MCV RBC AUTO: 83 FL (ref 78–100)
MONOCYTES # BLD MANUAL: 0.1 10E3/UL (ref 0–1.3)
MONOCYTES NFR BLD MANUAL: 5 %
NEUTROPHILS # BLD MANUAL: 0.4 10E3/UL (ref 1.6–8.3)
NEUTROPHILS NFR BLD MANUAL: 32 %
PLAT MORPH BLD: ABNORMAL
PLATELET # BLD AUTO: 87 10E3/UL (ref 150–450)
POTASSIUM SERPL-SCNC: 4 MMOL/L (ref 3.4–5.3)
PROT SERPL-MCNC: 5.3 G/DL (ref 6.4–8.3)
RBC # BLD AUTO: 3.03 10E6/UL (ref 4.4–5.9)
RBC MORPH BLD: ABNORMAL
SODIUM SERPL-SCNC: 138 MMOL/L (ref 136–145)
WBC # BLD AUTO: 1.2 10E3/UL (ref 4–11)

## 2023-09-01 PROCEDURE — 97535 SELF CARE MNGMENT TRAINING: CPT | Mod: GO

## 2023-09-01 PROCEDURE — 250N000011 HC RX IP 250 OP 636: Mod: JZ | Performed by: STUDENT IN AN ORGANIZED HEALTH CARE EDUCATION/TRAINING PROGRAM

## 2023-09-01 PROCEDURE — 85027 COMPLETE CBC AUTOMATED: CPT | Performed by: PHYSICIAN ASSISTANT

## 2023-09-01 PROCEDURE — 97110 THERAPEUTIC EXERCISES: CPT | Mod: GO

## 2023-09-01 PROCEDURE — 97530 THERAPEUTIC ACTIVITIES: CPT | Mod: GP | Performed by: PHYSICAL THERAPIST

## 2023-09-01 PROCEDURE — 250N000011 HC RX IP 250 OP 636: Performed by: INTERNAL MEDICINE

## 2023-09-01 PROCEDURE — 80053 COMPREHEN METABOLIC PANEL: CPT | Performed by: PHYSICIAN ASSISTANT

## 2023-09-01 PROCEDURE — 36415 COLL VENOUS BLD VENIPUNCTURE: CPT | Performed by: PHYSICIAN ASSISTANT

## 2023-09-01 PROCEDURE — 120N000009 HC R&B SNF

## 2023-09-01 PROCEDURE — 85007 BL SMEAR W/DIFF WBC COUNT: CPT | Performed by: PHYSICIAN ASSISTANT

## 2023-09-01 PROCEDURE — 250N000011 HC RX IP 250 OP 636: Mod: JZ | Performed by: PHYSICIAN ASSISTANT

## 2023-09-01 PROCEDURE — 250N000013 HC RX MED GY IP 250 OP 250 PS 637: Performed by: INTERNAL MEDICINE

## 2023-09-01 PROCEDURE — 250N000013 HC RX MED GY IP 250 OP 250 PS 637: Performed by: PHYSICIAN ASSISTANT

## 2023-09-01 RX ORDER — FENTANYL 50 UG/1
50 PATCH TRANSDERMAL
Status: DISCONTINUED | OUTPATIENT
Start: 2023-09-01 | End: 2023-09-08 | Stop reason: HOSPADM

## 2023-09-01 RX ADMIN — PANTOPRAZOLE SODIUM 40 MG: 40 TABLET, DELAYED RELEASE ORAL at 08:11

## 2023-09-01 RX ADMIN — ACETAMINOPHEN 975 MG: 325 TABLET, FILM COATED ORAL at 20:23

## 2023-09-01 RX ADMIN — ACETAMINOPHEN 975 MG: 325 TABLET, FILM COATED ORAL at 08:11

## 2023-09-01 RX ADMIN — Medication 25 MG: at 20:24

## 2023-09-01 RX ADMIN — ACETAMINOPHEN 975 MG: 325 TABLET, FILM COATED ORAL at 14:29

## 2023-09-01 RX ADMIN — METHOCARBAMOL 750 MG: 750 TABLET ORAL at 20:24

## 2023-09-01 RX ADMIN — FILGRASTIM-AAFI 300 MCG: 300 INJECTION, SOLUTION INTRAVENOUS; SUBCUTANEOUS at 20:33

## 2023-09-01 RX ADMIN — ENOXAPARIN SODIUM 40 MG: 40 INJECTION SUBCUTANEOUS at 20:23

## 2023-09-01 RX ADMIN — OXYCODONE HYDROCHLORIDE 15 MG: 10 TABLET ORAL at 08:11

## 2023-09-01 RX ADMIN — OXYCODONE HYDROCHLORIDE 15 MG: 10 TABLET ORAL at 14:30

## 2023-09-01 RX ADMIN — FENTANYL 1 PATCH: 50 PATCH TRANSDERMAL at 11:24

## 2023-09-01 RX ADMIN — OXYCODONE HYDROCHLORIDE 15 MG: 10 TABLET ORAL at 20:24

## 2023-09-01 RX ADMIN — HEPARIN, PORCINE (PF) 10 UNIT/ML INTRAVENOUS SYRINGE 5 ML: at 08:12

## 2023-09-01 ASSESSMENT — ACTIVITIES OF DAILY LIVING (ADL)
ADLS_ACUITY_SCORE: 41

## 2023-09-01 NOTE — PLAN OF CARE
"Goal Outcome Evaluation:    VS: /65 (BP Location: Left arm)   Pulse 117   Temp 99.1  F (37.3  C) (Oral)   Resp 16   Wt 74.8 kg (164 lb 14.4 oz)   SpO2 94%   BMI 29.59 kg/m      O2: RA   Output: Urinal; staff empties, good output   Last BM: 8/30   Activity: Therapy; up in w/c; family visiting  LLE TTWB   Skin: L hip fxr   L lateral hand/pinky blister; intact; Mepilex placed for \"P\"   Pain: Oxy Q4H x2  Right shoulder Fentanyl patch in place   CMS:  Neuro: RUE numbness; new, possibly DT chemo  A&O   Dressing: LUE Mepilex, CDI   Diet: Reg   LDA: R PICC x2, patent, +BR   Equipment: WW, ARTHUR, w/c    Plan: Con't POC.    Additional Info: Neutropenic & chemo precautions in place.  WBC 1.2; ANS 0.4       Patient's most recent vital signs are:     Vital signs:  BP: 110/65  Temp: 99.1  HR: 117  RR: 16  SpO2: 94 %     Patient does not have new respiratory symptoms.  Patient does not have new sore throat.  Patient does not have a fever greater than 99.5.    "

## 2023-09-01 NOTE — PROGRESS NOTES
CLINICAL NUTRITION SERVICES - REASSESSMENT NOTE     Nutrition Prescription    RECOMMENDATIONS FOR MDs/PROVIDERS TO ORDER:  None    Malnutrition Status:    Moderate malnutrition in the context of chronic illness    Recommendations already ordered by Registered Dietitian (RD):  Continue as ordered    Future/Additional Recommendations:  Monitor labs, intakes, and weight trends.  Monitor supplement preferences     EVALUATION OF THE PROGRESS TOWARD GOALS   Diet: Regular  Intake/[place]: 100 % of documented meals. Poorly documented.   Snacks/supplements: Berry Ensure Clear at 2 pm Additional supplements PRN      NEW FINDINGS/REVIEW OF SYSTEMS    Nutrition/GI: RD met with pt and wife in room. Patient reports his appetite is still a bit low but he is trying his best. Does not really enjoy the Ensure Clear but is willing to continue drinking it. Explained pt is able to order any supplement as needed. Pt's wife had brought sandwiches for a late lunch and greek yogurt for future snacks. Encouraged continued outside food and use of supplements PRN.     Weights: Pt with limited weight history prior to admission,with 15 lb (8%) weight loss over 2 months. Pt weight stable since admission to TCU.     Wt Readings from Last Encounters:   09/01/23 74.8 kg (164 lb 14.4 oz)    08/24/23 72.6 kg (160 lb 0.9 oz)   08/24/23 74.8 kg (164 lb 12.8 oz)   77.2 kg (170 lb 3.1 oz) 07/27/2023  81.5 kg (179 lb 10.8 oz) 06/21/2023  81.5 kg (179 lb 10.8 oz) 06/06/2023  81.5 kg (179 lb 12.6 oz) 04/21/2023  82.7 kg (182 lb 5.1 oz) 01/20/2023     Labs reviewed   08/28/23 06:00 08/30/23 06:55 09/01/23 06:33   Sodium 135 (L) 137 138   Potassium 4.0 4.1 4.0   Urea Nitrogen 17.9 14.4 13.4   Magnesium 2.1     Albumin 2.9 (L) 3.0 (L) 2.7 (L)   Protein Total 5.5 (L) 5.6 (L) 5.3 (L)   Alkaline Phosphatase 185 (H) 158 (H) 135 (H)   Glucose 91 101 (H) 94      Medications reviewed: protonix, oxycodone PRN    MALNUTRITION  % Intake: Unable to assess  % Weight  Loss: > 7.5% in 3 months (severe)  Subcutaneous Fat Loss: None observed   Muscle Loss:  Global mild  Fluid Accumulation/Edema: None noted  Malnutrition Diagnosis: Moderate malnutrition in the context of chronic illness    Previous Goals   Patient to consume % of nutritionally adequate meal trays TID, or the equivalent with supplements/snacks.  Evaluation: Likely met    Previous Nutrition Diagnosis  Inadequate oral intake related to decreased appetite 2/2 taste changes and immobility as evidenced by patient report and 8.2% wt loss in 3 months.     Evaluation: Improving    CURRENT NUTRITION DIAGNOSIS  Predicted inadequate nutrient intake (protein-energy) related to eating mostly 100% of meals documented the past week with good appetite but potential for PO intake decline    INTERVENTIONS  Implementation  Nutrition education for nutrition relationship to health/disease   Medical food supplement therapy - encouraged PRN use    Goals  Patient to consume % of nutritionally adequate meal trays TID, or the equivalent with supplements/snacks.    Monitoring/Evaluation  Progress toward goals will be monitored and evaluated per protocol.    Kaur Beltran MS, RDN, LD  RD pager: [phone]  WB Weekend/Holiday Pager: [phone]

## 2023-09-01 NOTE — PLAN OF CARE
Goal Outcome Evaluation:    Pt is alert and oriented x 4 , can make needs know, denies SOB, CP, and n/v. Pt denies pain, continent of B&B , urinal by bed side, right side double lumen is CDI. Regular diet, take spill whole,.   Noted  72 H fentanyl patch not in place , patch free not due from last placement. To follow up with incoming nurse. No acute issue on this shift, call light within reach, continue with plan of care.        Patient's most recent vital signs are:     Vital signs:  BP: 109/74  Temp: 96.1  HR: 102  RR: 18  SpO2: 96 %     Patient does not have new respiratory symptoms.  Patient does not have new sore throat.  Patient does not have a fever greater than 99.5.

## 2023-09-01 NOTE — PROGRESS NOTES
VAHE met with pt and wife.  VAHE updated them on HH.  SW gave wife another parking pass for this week.  #[phone]. Pt is very worried about chemo date being too soon.  VAHE updated PA.  She said Pt will have to have Oncology dr in [place] talk to Oncology dr here.  Pt already has that in the works.     VAHE called admissions and added PCP.       BELLE Alvarez   [facility], Transitional Care Unit   Social Work   [facility] [place] [address]  (PH) [phone]

## 2023-09-01 NOTE — PLAN OF CARE
Goal Outcome Evaluation:       A &O x 4. Denies SOB, CP, N&V and chill or fever. A1 with gb and walker. Regular diet and take pills whole without difficulty. Double lumen right basilic. Call light within reach. No acute issues this shift. Continue to monitor POC.          Patient's most recent vital signs are:     Vital signs:  BP: 109/74  Temp: 96.1  HR: 102  RR: 18  SpO2: 96 %     Patient does not have new respiratory symptoms.  Patient does not have new sore throat.  Patient does not have a fever greater than 99.5.

## 2023-09-02 ENCOUNTER — APPOINTMENT (OUTPATIENT)
Dept: GENERAL RADIOLOGY | Facility: CLINIC | Age: 43
End: 2023-09-02
Attending: PHYSICIAN ASSISTANT
Payer: COMMERCIAL

## 2023-09-02 LAB — TROPONIN T SERPL HS-MCNC: 12 NG/L

## 2023-09-02 PROCEDURE — 93010 ELECTROCARDIOGRAM REPORT: CPT | Performed by: INTERNAL MEDICINE

## 2023-09-02 PROCEDURE — 250N000013 HC RX MED GY IP 250 OP 250 PS 637: Performed by: INTERNAL MEDICINE

## 2023-09-02 PROCEDURE — 250N000013 HC RX MED GY IP 250 OP 250 PS 637: Performed by: PHYSICIAN ASSISTANT

## 2023-09-02 PROCEDURE — 99310 SBSQ NF CARE HIGH MDM 45: CPT | Performed by: PHYSICIAN ASSISTANT

## 2023-09-02 PROCEDURE — 93005 ELECTROCARDIOGRAM TRACING: CPT

## 2023-09-02 PROCEDURE — 250N000011 HC RX IP 250 OP 636: Mod: JZ | Performed by: STUDENT IN AN ORGANIZED HEALTH CARE EDUCATION/TRAINING PROGRAM

## 2023-09-02 PROCEDURE — 84484 ASSAY OF TROPONIN QUANT: CPT | Performed by: PHYSICIAN ASSISTANT

## 2023-09-02 PROCEDURE — 71046 X-RAY EXAM CHEST 2 VIEWS: CPT | Mod: 26 | Performed by: STUDENT IN AN ORGANIZED HEALTH CARE EDUCATION/TRAINING PROGRAM

## 2023-09-02 PROCEDURE — 71046 X-RAY EXAM CHEST 2 VIEWS: CPT

## 2023-09-02 PROCEDURE — 36415 COLL VENOUS BLD VENIPUNCTURE: CPT | Performed by: PHYSICIAN ASSISTANT

## 2023-09-02 PROCEDURE — 120N000009 HC R&B SNF

## 2023-09-02 PROCEDURE — 250N000011 HC RX IP 250 OP 636: Performed by: INTERNAL MEDICINE

## 2023-09-02 PROCEDURE — 250N000011 HC RX IP 250 OP 636: Mod: JZ | Performed by: PHYSICIAN ASSISTANT

## 2023-09-02 RX ORDER — LIDOCAINE 4 G/G
1-2 PATCH TOPICAL
Status: DISCONTINUED | OUTPATIENT
Start: 2023-09-03 | End: 2023-09-08 | Stop reason: HOSPADM

## 2023-09-02 RX ORDER — IBUPROFEN 600 MG/1
600 TABLET, FILM COATED ORAL EVERY 6 HOURS
Status: DISPENSED | OUTPATIENT
Start: 2023-09-02 | End: 2023-09-03

## 2023-09-02 RX ADMIN — OXYCODONE HYDROCHLORIDE 15 MG: 10 TABLET ORAL at 14:43

## 2023-09-02 RX ADMIN — METHOCARBAMOL 750 MG: 750 TABLET ORAL at 18:55

## 2023-09-02 RX ADMIN — Medication 25 MG: at 20:45

## 2023-09-02 RX ADMIN — ACETAMINOPHEN 975 MG: 325 TABLET, FILM COATED ORAL at 20:45

## 2023-09-02 RX ADMIN — HYDROXYZINE HYDROCHLORIDE 50 MG: 25 TABLET, FILM COATED ORAL at 00:33

## 2023-09-02 RX ADMIN — ENOXAPARIN SODIUM 40 MG: 40 INJECTION SUBCUTANEOUS at 20:45

## 2023-09-02 RX ADMIN — OXYCODONE HYDROCHLORIDE 15 MG: 10 TABLET ORAL at 18:55

## 2023-09-02 RX ADMIN — HYDROXYZINE HYDROCHLORIDE 50 MG: 25 TABLET, FILM COATED ORAL at 23:36

## 2023-09-02 RX ADMIN — FILGRASTIM-AAFI 300 MCG: 300 INJECTION, SOLUTION INTRAVENOUS; SUBCUTANEOUS at 20:45

## 2023-09-02 RX ADMIN — HEPARIN, PORCINE (PF) 10 UNIT/ML INTRAVENOUS SYRINGE 10 ML: at 08:15

## 2023-09-02 RX ADMIN — HYDROXYZINE HYDROCHLORIDE 50 MG: 25 TABLET, FILM COATED ORAL at 08:16

## 2023-09-02 RX ADMIN — ACETAMINOPHEN 975 MG: 325 TABLET, FILM COATED ORAL at 04:23

## 2023-09-02 RX ADMIN — METHOCARBAMOL 750 MG: 750 TABLET ORAL at 02:44

## 2023-09-02 RX ADMIN — OXYCODONE HYDROCHLORIDE 15 MG: 10 TABLET ORAL at 00:33

## 2023-09-02 RX ADMIN — OXYCODONE HYDROCHLORIDE 15 MG: 10 TABLET ORAL at 08:16

## 2023-09-02 RX ADMIN — OXYCODONE HYDROCHLORIDE 15 MG: 10 TABLET ORAL at 23:36

## 2023-09-02 RX ADMIN — OXYCODONE HYDROCHLORIDE 15 MG: 10 TABLET ORAL at 04:23

## 2023-09-02 RX ADMIN — PANTOPRAZOLE SODIUM 40 MG: 40 TABLET, DELAYED RELEASE ORAL at 08:16

## 2023-09-02 RX ADMIN — IBUPROFEN 600 MG: 600 TABLET ORAL at 18:56

## 2023-09-02 RX ADMIN — ACETAMINOPHEN 975 MG: 325 TABLET, FILM COATED ORAL at 11:59

## 2023-09-02 RX ADMIN — METHOCARBAMOL 750 MG: 750 TABLET ORAL at 11:59

## 2023-09-02 ASSESSMENT — ACTIVITIES OF DAILY LIVING (ADL)
ADLS_ACUITY_SCORE: 41

## 2023-09-02 NOTE — PROGRESS NOTES
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 He was admitted to [facility] 8/10-8/24/23 for pathologic left femoral neck and acetabulum fractures for which he underwent closed reduction percutaneous pinning (CRPP) of left femoral neck on 8/14. Bronchoscopy done 8/17 w/ biopsy confirming metastatic osteosarcoma. Started chemotherapy 8/19. Stay c/b delirium and hyponatremia which have resolved, as well as acute on chronic pain and anemia. Transferred to TCU for ongoing rehabilitation.     #Metastatic High Grade Chondroblastic Osteosarcoma.   #Pathologic Left Femoral Neck and Acetabular Fractures, s/p CRPP of Left Femoral Neck 8/14/23.   Recently diagnosed osteosarcoma and PET CT was concerning for mets to lungs. Admitted w/ acute pathologic fractures and underwent pinning of the femoral neck w/ Dr. Avila on 8/14 to allow for more comfort (not anticipated to heal or have reliable weight bearing) before revisiting the primary tumor after doing chemotherapy. For the lung lesions, underwent bronch on 8/17 w/ path confirming metastatic osteosarcoma. Oncology consulted and started chemotherapy 8/19-22 w/ doxorubicin/cisplatin. Last post op x-rays 8/30 were stable.   - Continue PT/OT. TTWB LLE and not expected to advance from there.   - Continue Lovenox for VTE prophylaxis. Anticipate continued need at discharge given immobility and underlying malignancy. [employer] message sent to Dr. Avila.   - Next post op x-rays due 9/25-10/9 per ortho. Can be done locally.   [place] notified via [employer] message of patient's dispo date, anticipate further follow up will be after more chemo.   - Chemo is Q21day cycles so next due 9/8 but patient prefers to receive locally in [place]. His local oncologist Dr. Palma is aware and has placed orders, anticipate 9/11. Keep PICC at discharge (no port in place yet).   - Pain control as below.     #Pancytopenia w/ Neutropenia. Related to chemo. Counts began dropping 8/28. Filgrastim started 8/31. Hgb 14 pre op --> 11s post op w/ blood loss --> mid 8s currently w/ all counts dropping after chemotherapy. Plt 87K (wnl prior to chemo).   - Continue filgrastim 5 mcg/kg/day this evening. This is the last ordered dose but anticipate he may have further need (prior oncology reccs were for up to 14 days until ANC reaches 1.0). Will follow up CBC w/ diff in AM.   - Neutropenic precautions.     #Acute on Chronic Chest Pain. Patient reports intermittent throbbing chest, back, and rib pain throughout the course of recent cancer dx. Today feels worse since right hip started hurting (see below). Is positional. No dyspnea and VS are stable.   - Suspect musculoskeletal and also question if bony pain is more diffuse w/ recent start of filgrastim but will obtain EKG, trop, CXR. (Addendum: EKG is mild sinus tach otherwise nml, trop wnl, CXR w/o acute findings).  - If pain worsens/changes or new hypoxia or worsening tachycardia would have very low threshold for chest CT (r/o PE and eval new mets).      #Acute Right Hip Pain.  #Acute on Chronic Cancer Related Opioid Dependent Pain.   On 50 mcg Fentanyl patch and Percocet  mg (up to Q4hr PRN) PTA prescribed by oncology. Oxycodone dose post op is 10-15 mg Q4hr PRN. Also on Robaxin, Tylenol, hydroxyzine. Overnight complained of new right hip pain w/o known injury (positioning more heavily on right hip d/t above left hip issues); exam benign and x-ray unrevealing for new pathologic fracture or other acute finding.    - Continue current regimen of Tylenol, Fentanyl patch, oxycodone, Robaxin, hydroxyzine. Added lidocaine patch trial, ice/heat. Will also trial ibuprofen 600 mg Q6hr x 3 doses until reassessment tomorrow. Cannot give IV meds at TCU. Low threshold for palliative consult to assist w/ pain management next week if persistent/worsening.   - Low threshold for ortho consult +/- advanced right hip imaging if persistent/worsening.     #Sinus Tachycardia. HR 100s-110s. No change. Asymptomatic. Suspect pain, anxiety, deconditioning all playing a role. Today w/ chest pain as above, considered PE but other etiologies felt more likely to start (is on Lovenox prophylaxis).   - Notify hospitalist if HR increasing above recent baseline.     #Opioid Related Constipation.   #Question of LUTS.   No BM in several days but hesitant to take meds and have a mess that inconveniences staff. Reassured him that we are here to help and he should be having BM every day or two and will likely need pharmacologic assistance while on opioids. Today also reporting he's having to push a bit more to void. Constipation, opioids, immobility can contribute to retention.   - Willing to try prune juice. Discussed w/ RN to escalate PRNs as needed for BM.   - RN will check PVR bladder scan and notify if abnormal. Currently neuro-intact but if new retention identified would have low threshold for spine imaging w/ metastatic CA.     #Left Hand Blister. Appears friction related, no e/o infection.   - Do not unroof blister. Can protect w/ mepilex. Notify hospitalist if new blisters arise.     #Moderate Malnutrition. In context of chronic illness. Dietitian following.     CODE: full  DVT: Lovenox  Diet: regular  Indications for Psychotropic Medications: hydroxyzine for anxiety/pain adjunct and trazodone for insomnia at lowest effective doses  Disposition: PT/OT through 9/7, home to [place] 9/8. His accepting local oncologist will arrange next chemo (~9/11).  "Discharge w/ PICC. [facility] services.     Omayra Solitario PA-C  Hospitalist Service  Pager: [phone]  ________________________________________________________________    Subjective & Interval History:  Morning complaint of right hip pain which was new overnight. He has been mostly positioning on the right hip in bed, and bears weight on right leg (as left leg is TTWB). No known injury but wondering if he overdid something with therapies. No groin pain. Pain radiates into the right low back. Ice/heat overnight and usual pain meds did not really help. X-ray was obtained this morning w/o acute findings. Later in the afternoon reports \"throbbing\" chest pain that comes and goes. Located throughout the anterior chest and radiates into the back and side ribs. Has had this pain for many weeks but feels worse since the hip started hurting. Feels worse when lying down. No cough, SOB, f/c, lightheadedness. Lastly, RN notes no BM for several days and patient states he had to push more today to void.     Last 24 hour care team notes reviewed.     Physical Exam:    Blood pressure 113/69, pulse 112, temperature 98.9  F (37.2  C), temperature source Oral, resp. rate 16, weight 74.8 kg (164 lb 14.4 oz), SpO2 92 %.    GENERAL: Alert and oriented x 3. Sitting up in bed, appears comfortable. Conversant.   HEENT: Anicteric sclera. Mucous membranes moist.   CV: RRR. S1, S2. No murmurs appreciated.   RESPIRATORY: Effort normal on room air. Lungs CTAB with no wheezing, rales, rhonchi.   GI: Abdomen soft, non distended, non tender.   MUSCULOSKELETAL: Active ROM of right hip in all directions w/o pain elicited. No point tenderness. Leg is in anatomic alignment.   NEUROLOGICAL: No focal deficits. Moves all extremities.   EXTREMITIES: No peripheral edema.   SKIN: No jaundice. No rashes. Small clear fluid filled blister on left lateral hand.     Lines/Tubes/Drains:   PICC 08/17/23 Double Lumen Right Basilic Chemotherapy. PICC okay to use. " (Active)       Medical Decision Makin MINUTES SPENT BY ME on the date of service doing chart review, history, exam, documentation & further activities per the note.      Data:  Reviewed all labs and imaging past 24 hours

## 2023-09-02 NOTE — PLAN OF CARE
"Goal Outcome Evaluation:    VS: /69 (BP Location: Left arm)   Pulse 112   Temp 98.9  F (37.2  C) (Oral)   Resp 16   Wt 74.8 kg (164 lb 14.4 oz)   SpO2 92%   BMI 29.59 kg/m      O2: RA   Output: Urinal; staff empties. Pt reported having to push to eliminate.   PVR: unable to complete; marina RN updated   Last BM: 8/30; pt feels constipated  Prune juice given to pt; pt wants to wait for Miralax/senna   Activity: Therapy cancelled DT new onset of R hip pain NOC.   LLE TTWB  Pt not OOB this shift   Skin: L hip fxr   L lateral hand/pinky blister; blister intact; Mepilex changed for \"P\"  Inform provider if more blisters form   Pain: Oxy Q4H x2  Atarax Q6H  Right shoulder Fentanyl patch in place   CMS:  Neuro: RUE numbness; recent, possibly DT chemo  R hip/leg pain.   A&O   Dressing: LUE Mepilex, CDI   Diet: Reg   LDA: R PICC x2, patent, +BR   Equipment: WW, GB, w/c    Plan: Con't POC.    Additional Info: Neutropenic & chemo precautions in place.  WBC 1.2; ANS 0.4  Pt had chest and pelvic xrays, EKG completed  [place]-pad set up for pt. Cold pack applied to R hip.       Patient's most recent vital signs are:     Vital signs:  BP: 113/69  Temp: 98.9  HR: 112  RR: 16  SpO2: 92 %     Patient does not have new respiratory symptoms.  Patient does not have new sore throat.  Patient does not have a fever greater than 99.5.    "

## 2023-09-02 NOTE — PLAN OF CARE
Goal Outcome Evaluation:  Pt.A&Ox4. C/o (L) hip pain w/inability to get comfortable enough to sleep. Medicated with Oxycodone 15mg po and Atarax 50mg po @ 0030. Reported having good effect but ~0230 pt.called with new c/o (R) hip pain/spasms. Pt.usually sleeps / lays on (R) hip and thinks maybe he slept wrong or spent too much time on (R) hip. Medicated with Robaxin @ 0245. Currently supine for now w/heat packs in use as well. Will cont.to monitor.    0450 - Pt.still w/(R) hip pain - medicated with Oxycodone 15mg and 0800 scheduled Tylenol @ 0425. Repositioned and now trying ice packs.  0645 - Still uncomfortable - pt.worried, thinking he may have new pathological fx R hip. Constantine noted MD to see pt.this AM - possible XR? Informed pt.to not make any major movements, currently supine w/HOB elevated., only slight movement for comfort. Will inform therapy to hold off today for now.        Patient's most recent vital signs are:     Vital signs:  BP: 117/73  Temp: 96.6  HR: 109  RR: 18  SpO2: 98 %     Patient does not have new respiratory symptoms.  Patient does not have new sore throat.  Patient does not have a fever greater than 99.5.

## 2023-09-02 NOTE — PLAN OF CARE
Goal Outcome Evaluation:         Pt alert and oriented x4, able to make needs known, No c/o chest pain, SOB, N/V. Given PRN oxycodone and robaxin at bedtime for generalized discomfort and back pain. Right upper arm fentanyl patch intact. Uses urinal at bedside. On neutropenic precaution. Will continue with POC.          Patient's most recent vital signs are:     Vital signs:  BP: 117/73  Temp: 96.6  HR: 109  RR: 18  SpO2: 98 %     Patient does not have new respiratory symptoms.  Patient does not have new sore throat.  Patient does not have a fever greater than 99.5.

## 2023-09-03 ENCOUNTER — APPOINTMENT (OUTPATIENT)
Dept: PHYSICAL THERAPY | Facility: SKILLED NURSING FACILITY | Age: 43
End: 2023-09-03
Attending: INTERNAL MEDICINE
Payer: COMMERCIAL

## 2023-09-03 LAB
ERYTHROCYTE [DISTWIDTH] IN BLOOD BY AUTOMATED COUNT: 12.8 % (ref 10–15)
HCT VFR BLD AUTO: 25.5 % (ref 40–53)
HGB BLD-MCNC: 8.5 G/DL (ref 13.3–17.7)
HOLD SPECIMEN: NORMAL
MCH RBC QN AUTO: 27.9 PG (ref 26.5–33)
MCHC RBC AUTO-ENTMCNC: 33.3 G/DL (ref 31.5–36.5)
MCV RBC AUTO: 84 FL (ref 78–100)
PLATELET # BLD AUTO: 101 10E3/UL (ref 150–450)
RBC # BLD AUTO: 3.05 10E6/UL (ref 4.4–5.9)
WBC # BLD AUTO: 1.8 10E3/UL (ref 4–11)

## 2023-09-03 PROCEDURE — 250N000011 HC RX IP 250 OP 636: Mod: JZ | Performed by: PHYSICIAN ASSISTANT

## 2023-09-03 PROCEDURE — 250N000013 HC RX MED GY IP 250 OP 250 PS 637: Performed by: INTERNAL MEDICINE

## 2023-09-03 PROCEDURE — 250N000013 HC RX MED GY IP 250 OP 250 PS 637: Performed by: PHYSICIAN ASSISTANT

## 2023-09-03 PROCEDURE — 97530 THERAPEUTIC ACTIVITIES: CPT | Mod: GP | Performed by: REHABILITATION PRACTITIONER

## 2023-09-03 PROCEDURE — 85027 COMPLETE CBC AUTOMATED: CPT | Performed by: PHYSICIAN ASSISTANT

## 2023-09-03 PROCEDURE — 36415 COLL VENOUS BLD VENIPUNCTURE: CPT | Performed by: PHYSICIAN ASSISTANT

## 2023-09-03 PROCEDURE — 85007 BL SMEAR W/DIFF WBC COUNT: CPT | Performed by: PHYSICIAN ASSISTANT

## 2023-09-03 PROCEDURE — 250N000011 HC RX IP 250 OP 636: Performed by: INTERNAL MEDICINE

## 2023-09-03 PROCEDURE — 120N000009 HC R&B SNF

## 2023-09-03 RX ADMIN — FILGRASTIM-AAFI 300 MCG: 300 INJECTION, SOLUTION INTRAVENOUS; SUBCUTANEOUS at 20:59

## 2023-09-03 RX ADMIN — HEPARIN, PORCINE (PF) 10 UNIT/ML INTRAVENOUS SYRINGE 10 ML: at 09:14

## 2023-09-03 RX ADMIN — ENOXAPARIN SODIUM 40 MG: 40 INJECTION SUBCUTANEOUS at 20:59

## 2023-09-03 RX ADMIN — OXYCODONE HYDROCHLORIDE 15 MG: 10 TABLET ORAL at 09:11

## 2023-09-03 RX ADMIN — SENNOSIDES AND DOCUSATE SODIUM 1 TABLET: 50; 8.6 TABLET ORAL at 01:59

## 2023-09-03 RX ADMIN — Medication 25 MG: at 20:59

## 2023-09-03 RX ADMIN — OXYCODONE HYDROCHLORIDE 15 MG: 10 TABLET ORAL at 21:00

## 2023-09-03 RX ADMIN — OXYCODONE HYDROCHLORIDE 15 MG: 10 TABLET ORAL at 14:33

## 2023-09-03 RX ADMIN — HYDROXYZINE HYDROCHLORIDE 50 MG: 25 TABLET, FILM COATED ORAL at 12:26

## 2023-09-03 RX ADMIN — METHOCARBAMOL 750 MG: 750 TABLET ORAL at 21:00

## 2023-09-03 RX ADMIN — LIDOCAINE PATCH 4% 2 PATCH: 40 PATCH TOPICAL at 09:12

## 2023-09-03 RX ADMIN — ACETAMINOPHEN 975 MG: 325 TABLET, FILM COATED ORAL at 20:59

## 2023-09-03 RX ADMIN — ACETAMINOPHEN 975 MG: 325 TABLET, FILM COATED ORAL at 09:10

## 2023-09-03 RX ADMIN — PANTOPRAZOLE SODIUM 40 MG: 40 TABLET, DELAYED RELEASE ORAL at 09:11

## 2023-09-03 RX ADMIN — ACETAMINOPHEN 975 MG: 325 TABLET, FILM COATED ORAL at 14:33

## 2023-09-03 RX ADMIN — METHOCARBAMOL 750 MG: 750 TABLET ORAL at 09:11

## 2023-09-03 RX ADMIN — IBUPROFEN 600 MG: 600 TABLET ORAL at 01:59

## 2023-09-03 RX ADMIN — CALCIUM CARBONATE (ANTACID) CHEW TAB 500 MG 1000 MG: 500 CHEW TAB at 21:00

## 2023-09-03 ASSESSMENT — ACTIVITIES OF DAILY LIVING (ADL)
ADLS_ACUITY_SCORE: 41
ADLS_ACUITY_SCORE: 41
ADLS_ACUITY_SCORE: 40
ADLS_ACUITY_SCORE: 41
ADLS_ACUITY_SCORE: 41
ADLS_ACUITY_SCORE: 40
ADLS_ACUITY_SCORE: 40
ADLS_ACUITY_SCORE: 41
ADLS_ACUITY_SCORE: 40
ADLS_ACUITY_SCORE: 41

## 2023-09-03 NOTE — PLAN OF CARE
Goal Outcome Evaluation:         Pt alert and oriented X4, able to make needs known. No c/o chest pain, SOB, N/V. Given/Requested PRN oxycodone and robaxin this shift, on scheduled ibuprofen and Tylenol. Fentanyl patch intact on right upper arm. Pt stated pain was getting better after administration of all pain meds. Pt has generalized discomfort and pain on hip and lower back. New order for lidocaine patch to start tomorrow AM. Random bladder scan done result 402 pt encouraged to urinate. Will continue with POC.          Patient's most recent vital signs are:     Vital signs:  BP: 114/64  Temp: 98.8  HR: 110  RR: 16  SpO2: 92 %     Patient does not have new respiratory symptoms.  Patient does not have new sore throat.  Patient does not have a fever greater than 99.5.

## 2023-09-03 NOTE — PLAN OF CARE
"Goal Outcome Evaluation:  Pt.having much better night in regards to (R) hip pain and sleep. Medicated with Oxycodone 15mg po and Atarax 50mg po @ 2335. Using Aqua K pad as well. Continent using urinal indep.- staff emptied. Previous shift did bladder scan approx.2330 = 402mls and pt.voided shortly after = 225mls. Noted LBM = small on 08/30. Pt.has bowel meds ordered PRN - administered Senokot S 1t @ 0200. Has prune juice bedside table that he says he will \"work on\" today. Pt.stated he's afraid of having accidents, which is why not asking for bowel meds. Education provided and did agree to suppository if no BM today. Has DL picc RUE.        Patient's most recent vital signs are:     Vital signs:  BP: 114/64  Temp: 98.8  HR: 110  RR: 16  SpO2: 92 %     Patient does not have new respiratory symptoms.  Patient does not have new sore throat.  Patient does not have a fever greater than 99.5.                               "

## 2023-09-03 NOTE — PROGRESS NOTES
Brief Medicine Note    Pain was better overnight.     WBC up to 1.8, ANC 0.5. Will dose filgrastim again tonight (300 mcg dose based on prior provider d/w heme/onc rather than 5 mcg/kg dose written in their previous notes) and follow up CBC in AM.     Yesterday had a bladder scan w/ 402 ml and was able to void 225 ml. Still no BM. Nursing to please encourage aggressive bowel regimen (important to resolve as any potential contributor to urinary retention) and continue to follow PVRs - notify hospitalist if > 300 ml or can stop if 3 scans are normal. If retaining or complaining of any urinary symptoms would send UA/UC, especially given neutropenic state.     Omayra Solitario PA-C  Hospitalist Service  Contact information available via [facility] Paging/Directory

## 2023-09-03 NOTE — PLAN OF CARE
"Goal Outcome Evaluation:    VS: /66 (BP Location: Left arm)   Pulse 98   Temp 98.6  F (37  C) (Oral)   Resp 16   Wt 74.8 kg (164 lb 14.4 oz)   SpO2 92%   BMI 29.59 kg/m       O2: RA   Output: Urinal; staff empties, good output  PVR: 200 mL post void.    Last BM: 8/30; pt feels constipated.  Prune juice given to pt 9/2 and still at bedside. Encouraged pt to try it. Pt wants to delay BM meds DT concern for urgency. Educated pt that staff can handle it.    Activity: Participated in therapy   LLE TTWB  Sister, JENNIFER, niece, & nephew visited   Skin: L hip fxr   L lateral hand/pinky blister; blister intact; Mepilex changed for \"P\"  Inform provider if more blisters form    Pain: Oxy Q4H x2  Atarax Q6H x1  Lidocaine patches x2 applied to Left hip/thigh  Right shoulder Fentanyl patch in place   CMS:  Neuro: RUE numbness; recent, possibly DT chemo  R hip/leg pain improving  A&O   Dressing: LUE Mepilex, CDI   Diet: Reg   LDA: R PICC x2, patent, +BR   Equipment: WW, GB, w/c    Plan: Con't POC.    Additional Info: Neutropenic & chemo precautions in place.  WBC 1.8; ANS 0.5     Patient's most recent vital signs are:     Vital signs:  BP: 103/66  Temp: 98.6  HR: 98  RR: 16  SpO2: 92 %     Patient does not have new respiratory symptoms.  Patient does not have new sore throat.  Patient does not have a fever greater than 99.5.    "

## 2023-09-04 LAB
ALBUMIN SERPL BCG-MCNC: 3 G/DL (ref 3.5–5.2)
ALP SERPL-CCNC: 153 U/L (ref 40–129)
ALT SERPL W P-5'-P-CCNC: 17 U/L (ref 0–70)
ANION GAP SERPL CALCULATED.3IONS-SCNC: 11 MMOL/L (ref 7–15)
AST SERPL W P-5'-P-CCNC: 26 U/L (ref 0–45)
BILIRUB SERPL-MCNC: 0.4 MG/DL
BUN SERPL-MCNC: 11 MG/DL (ref 6–20)
CALCIUM SERPL-MCNC: 9.4 MG/DL (ref 8.6–10)
CHLORIDE SERPL-SCNC: 98 MMOL/L (ref 98–107)
CREAT SERPL-MCNC: 0.85 MG/DL (ref 0.67–1.17)
DEPRECATED HCO3 PLAS-SCNC: 28 MMOL/L (ref 22–29)
ERYTHROCYTE [DISTWIDTH] IN BLOOD BY AUTOMATED COUNT: 12.7 % (ref 10–15)
GFR SERPL CREATININE-BSD FRML MDRD: >90 ML/MIN/1.73M2
GLUCOSE SERPL-MCNC: 95 MG/DL (ref 70–99)
HCT VFR BLD AUTO: 27 % (ref 40–53)
HGB BLD-MCNC: 8.9 G/DL (ref 13.3–17.7)
MCH RBC QN AUTO: 27.8 PG (ref 26.5–33)
MCHC RBC AUTO-ENTMCNC: 33 G/DL (ref 31.5–36.5)
MCV RBC AUTO: 84 FL (ref 78–100)
PLATELET # BLD AUTO: 146 10E3/UL (ref 150–450)
POTASSIUM SERPL-SCNC: 3.7 MMOL/L (ref 3.4–5.3)
PROT SERPL-MCNC: 5.8 G/DL (ref 6.4–8.3)
RBC # BLD AUTO: 3.2 10E6/UL (ref 4.4–5.9)
SODIUM SERPL-SCNC: 137 MMOL/L (ref 136–145)
WBC # BLD AUTO: 9 10E3/UL (ref 4–11)

## 2023-09-04 PROCEDURE — 250N000013 HC RX MED GY IP 250 OP 250 PS 637: Performed by: PHYSICIAN ASSISTANT

## 2023-09-04 PROCEDURE — 120N000009 HC R&B SNF

## 2023-09-04 PROCEDURE — 250N000011 HC RX IP 250 OP 636: Mod: JZ | Performed by: PHYSICIAN ASSISTANT

## 2023-09-04 PROCEDURE — 250N000011 HC RX IP 250 OP 636: Performed by: INTERNAL MEDICINE

## 2023-09-04 PROCEDURE — 250N000013 HC RX MED GY IP 250 OP 250 PS 637: Performed by: INTERNAL MEDICINE

## 2023-09-04 PROCEDURE — 36415 COLL VENOUS BLD VENIPUNCTURE: CPT | Performed by: PHYSICIAN ASSISTANT

## 2023-09-04 PROCEDURE — 80053 COMPREHEN METABOLIC PANEL: CPT | Performed by: PHYSICIAN ASSISTANT

## 2023-09-04 PROCEDURE — 85027 COMPLETE CBC AUTOMATED: CPT | Performed by: PHYSICIAN ASSISTANT

## 2023-09-04 PROCEDURE — 85007 BL SMEAR W/DIFF WBC COUNT: CPT | Performed by: PHYSICIAN ASSISTANT

## 2023-09-04 RX ADMIN — ENOXAPARIN SODIUM 40 MG: 40 INJECTION SUBCUTANEOUS at 21:04

## 2023-09-04 RX ADMIN — HYDROXYZINE HYDROCHLORIDE 50 MG: 25 TABLET, FILM COATED ORAL at 03:54

## 2023-09-04 RX ADMIN — ACETAMINOPHEN 975 MG: 325 TABLET, FILM COATED ORAL at 09:05

## 2023-09-04 RX ADMIN — HEPARIN, PORCINE (PF) 10 UNIT/ML INTRAVENOUS SYRINGE 5 ML: at 09:06

## 2023-09-04 RX ADMIN — LIDOCAINE PATCH 4% 2 PATCH: 40 PATCH TOPICAL at 09:03

## 2023-09-04 RX ADMIN — OXYCODONE HYDROCHLORIDE 15 MG: 10 TABLET ORAL at 14:25

## 2023-09-04 RX ADMIN — METHOCARBAMOL 750 MG: 750 TABLET ORAL at 09:05

## 2023-09-04 RX ADMIN — OXYCODONE HYDROCHLORIDE 15 MG: 10 TABLET ORAL at 03:54

## 2023-09-04 RX ADMIN — ACETAMINOPHEN 975 MG: 325 TABLET, FILM COATED ORAL at 21:04

## 2023-09-04 RX ADMIN — ACETAMINOPHEN 975 MG: 325 TABLET, FILM COATED ORAL at 14:25

## 2023-09-04 RX ADMIN — PANTOPRAZOLE SODIUM 40 MG: 40 TABLET, DELAYED RELEASE ORAL at 09:05

## 2023-09-04 RX ADMIN — FENTANYL 1 PATCH: 50 PATCH TRANSDERMAL at 09:07

## 2023-09-04 RX ADMIN — SENNOSIDES AND DOCUSATE SODIUM 1 TABLET: 50; 8.6 TABLET ORAL at 03:54

## 2023-09-04 RX ADMIN — Medication 25 MG: at 21:04

## 2023-09-04 RX ADMIN — OXYCODONE HYDROCHLORIDE 15 MG: 10 TABLET ORAL at 09:05

## 2023-09-04 RX ADMIN — OXYCODONE HYDROCHLORIDE 15 MG: 10 TABLET ORAL at 21:05

## 2023-09-04 RX ADMIN — BISACODYL 10 MG: 10 SUPPOSITORY RECTAL at 09:09

## 2023-09-04 RX ADMIN — METHOCARBAMOL 750 MG: 750 TABLET ORAL at 21:05

## 2023-09-04 ASSESSMENT — ACTIVITIES OF DAILY LIVING (ADL)
ADLS_ACUITY_SCORE: 38
ADLS_ACUITY_SCORE: 38
ADLS_ACUITY_SCORE: 40
ADLS_ACUITY_SCORE: 38
ADLS_ACUITY_SCORE: 40
ADLS_ACUITY_SCORE: 41
ADLS_ACUITY_SCORE: 38
ADLS_ACUITY_SCORE: 41
ADLS_ACUITY_SCORE: 41
ADLS_ACUITY_SCORE: 38

## 2023-09-04 NOTE — PLAN OF CARE
Goal Outcome Evaluation:         Pt alert and oriented x4, able to make needs known. No c/o chest pain, SOB, N/V. Pt stated pain now manageable with PRN oxycodone, robaxin and lidocaine patch. On scheduled tylenol. R-PICC dressing changed this evening. Lidocaine patch removed from Left hip and buttocks. PRN oxycodone and robaxin given x1 this shift. Will continue with POC.          Patient's most recent vital signs are:     Vital signs:  BP: 104/65  Temp: 99.1  HR: 119  RR: 16  SpO2: 91 %     Patient does not have new respiratory symptoms.  Patient does not have new sore throat.  Patient does not have a fever greater than 99.5.

## 2023-09-04 NOTE — PLAN OF CARE
"Goal Outcome Evaluation:  Pt.sleeping well and called ~0345 to empty urinal - voided 300mls slightly cloudy,dark yellow urine. Accepted offer PRN pain meds for marcie.hip pain - medicated with Oxycodone / Atarax and PRN Senokot S. Writer again stressed the importance of having a BM - last documented was a small BM on 08/30. Also mentioned the prune juice still on bedside table that he said he would drink yesterday. Pt.voiced concern about \"not wanting to have an accident\". Writer reiterated and re-educated on the importance of having regular BMs / adverse effects of constipation. Pt.acknowledged and agreed that the plan today will be to work on his bowels since there is no therapy. Will report to oncoming RN to administer PRN bowel meds.        Patient's most recent vital signs are:     Vital signs:  BP: 104/65  Temp: 99.1  HR: 119  RR: 16  SpO2: 91 %     Patient does not have new respiratory symptoms.  Patient does not have new sore throat.  Patient does not have a fever greater than 99.5.                             "

## 2023-09-04 NOTE — PLAN OF CARE
"Goal Outcome Evaluation:    VS: /62 (BP Location: Left arm)   Pulse 119   Temp 99.8  F (37.7  C) (Oral)   Resp 16   Wt 74.8 kg (164 lb 14.4 oz)   SpO2 90%   BMI 29.59 kg/m      O2: RA   Output: Urinal; staff empties, good output  PVR:    Last BM: 9/4; pt was constipated. Given suppository and 2 prune juices.    Pt had many small and med BMs; using BSC   Activity: Family/friends visiting  LLE TTWB   Skin: L hip fxr   L lateral hand/pinky blister; blister intact; Mepilex changed for \"P\"  Inform provider if more blisters form    Pain: Oxy Q4H   Atarax Q6H   Lidocaine patches x2 applied to Left hip/thigh  Left arm Fentanyl patch in place   CMS:  Neuro: RUE some numbness; recent, possibly DT chemo  R hip/leg pain improving  A&O   Dressing: LUE Mepilex, CDI   Diet: Reg   LDA: R PICC x2, patent, +BR   Equipment: W/c, GB, WW, BSC   Plan: Con't POC.    Additional Info: Neutropenic & chemo precautions in place     Patient's most recent vital signs are:     Vital signs:  BP: 112/62  Temp: 99.8  HR: 119  RR: 16  SpO2: 90 %     Patient does not have new respiratory symptoms.  Patient does not have new sore throat.  Patient does have a fever greater than 99.5.  "

## 2023-09-04 NOTE — PLAN OF CARE
Goal Outcome Evaluation:    0953: Rec'd phone call from lab that WBC results will be delayed until Tuesday, 9/5.

## 2023-09-05 ENCOUNTER — APPOINTMENT (OUTPATIENT)
Dept: PHYSICAL THERAPY | Facility: SKILLED NURSING FACILITY | Age: 43
End: 2023-09-05
Attending: INTERNAL MEDICINE
Payer: COMMERCIAL

## 2023-09-05 ENCOUNTER — APPOINTMENT (OUTPATIENT)
Dept: OCCUPATIONAL THERAPY | Facility: SKILLED NURSING FACILITY | Age: 43
End: 2023-09-05
Attending: INTERNAL MEDICINE
Payer: COMMERCIAL

## 2023-09-05 LAB
ATRIAL RATE - MUSE: 111 BPM
BASOPHILS # BLD MANUAL: 0 10E3/UL (ref 0–0.2)
BASOPHILS NFR BLD MANUAL: 1 %
DIASTOLIC BLOOD PRESSURE - MUSE: NORMAL MMHG
EOSINOPHIL # BLD MANUAL: 0 10E3/UL (ref 0–0.7)
EOSINOPHIL NFR BLD MANUAL: 0 %
INTERPRETATION ECG - MUSE: NORMAL
LYMPHOCYTES # BLD MANUAL: 1.1 10E3/UL (ref 0.8–5.3)
LYMPHOCYTES NFR BLD MANUAL: 59 %
MONOCYTES # BLD MANUAL: 0.1 10E3/UL (ref 0–1.3)
MONOCYTES NFR BLD MANUAL: 8 %
MYELOCYTES # BLD MANUAL: 0 10E3/UL
MYELOCYTES NFR BLD MANUAL: 1 %
NEUTROPHILS # BLD MANUAL: 0.5 10E3/UL (ref 1.6–8.3)
NEUTROPHILS NFR BLD MANUAL: 25 %
NRBC # BLD AUTO: 0.1 10E3/UL
NRBC BLD MANUAL-RTO: 5 %
OTHER CELLS # BLD MANUAL: 0.1 10E3/UL
OTHER CELLS NFR BLD MANUAL: 6 %
P AXIS - MUSE: 50 DEGREES
PATH REV: ABNORMAL
PLAT MORPH BLD: ABNORMAL
PR INTERVAL - MUSE: 134 MS
QRS DURATION - MUSE: 78 MS
QT - MUSE: 296 MS
QTC - MUSE: 402 MS
R AXIS - MUSE: 38 DEGREES
RBC MORPH BLD: ABNORMAL
SYSTOLIC BLOOD PRESSURE - MUSE: NORMAL MMHG
T AXIS - MUSE: 24 DEGREES
VENTRICULAR RATE- MUSE: 111 BPM

## 2023-09-05 PROCEDURE — 97530 THERAPEUTIC ACTIVITIES: CPT | Mod: GO

## 2023-09-05 PROCEDURE — 250N000013 HC RX MED GY IP 250 OP 250 PS 637: Performed by: PHYSICIAN ASSISTANT

## 2023-09-05 PROCEDURE — 120N000009 HC R&B SNF

## 2023-09-05 PROCEDURE — 250N000011 HC RX IP 250 OP 636: Performed by: INTERNAL MEDICINE

## 2023-09-05 PROCEDURE — 97110 THERAPEUTIC EXERCISES: CPT | Mod: GP

## 2023-09-05 PROCEDURE — 250N000013 HC RX MED GY IP 250 OP 250 PS 637: Performed by: INTERNAL MEDICINE

## 2023-09-05 PROCEDURE — 250N000011 HC RX IP 250 OP 636: Mod: JZ | Performed by: PHYSICIAN ASSISTANT

## 2023-09-05 PROCEDURE — 99207 PR NO BILLABLE SERVICE THIS VISIT: CPT | Performed by: PHYSICIAN ASSISTANT

## 2023-09-05 PROCEDURE — 97530 THERAPEUTIC ACTIVITIES: CPT | Mod: GP

## 2023-09-05 RX ADMIN — HEPARIN, PORCINE (PF) 10 UNIT/ML INTRAVENOUS SYRINGE 10 ML: at 08:19

## 2023-09-05 RX ADMIN — ENOXAPARIN SODIUM 40 MG: 40 INJECTION SUBCUTANEOUS at 20:45

## 2023-09-05 RX ADMIN — HYDROXYZINE HYDROCHLORIDE 50 MG: 25 TABLET, FILM COATED ORAL at 20:46

## 2023-09-05 RX ADMIN — PANTOPRAZOLE SODIUM 40 MG: 40 TABLET, DELAYED RELEASE ORAL at 08:19

## 2023-09-05 RX ADMIN — OXYCODONE HYDROCHLORIDE 15 MG: 10 TABLET ORAL at 20:47

## 2023-09-05 RX ADMIN — ACETAMINOPHEN 975 MG: 325 TABLET, FILM COATED ORAL at 08:19

## 2023-09-05 RX ADMIN — LIDOCAINE PATCH 4% 2 PATCH: 40 PATCH TOPICAL at 08:20

## 2023-09-05 RX ADMIN — OXYCODONE HYDROCHLORIDE 15 MG: 10 TABLET ORAL at 14:35

## 2023-09-05 RX ADMIN — ACETAMINOPHEN 975 MG: 325 TABLET, FILM COATED ORAL at 20:45

## 2023-09-05 RX ADMIN — Medication 25 MG: at 20:45

## 2023-09-05 RX ADMIN — ACETAMINOPHEN 975 MG: 325 TABLET, FILM COATED ORAL at 13:44

## 2023-09-05 ASSESSMENT — ACTIVITIES OF DAILY LIVING (ADL)
ADLS_ACUITY_SCORE: 38

## 2023-09-05 NOTE — PROGRESS NOTES
Brief Medicine Note    Right hip pain significantly improved. Patient reports having diffuse bony pain over the weekend - discussed that this was most likely secondary to filgrastim. He has no other concerns today. Feels ready for discharge on 9/8. Chemo plan on 9/11 reviewed.         Janet Garcia PA-C  Hospitalist Service  Pager: [phone]

## 2023-09-05 NOTE — PLAN OF CARE
Goal Outcome Evaluation:    Pt is alert and oriented x 4 can make needs known, denied pain on this shift, denies SOB, CP, and no n/v .continent of bowel and bladder, urinal by bed side. Assist of one with walker. Fentanyl patch intact on right upper arm. PICC  double lumen on the right , dressing is CDI. On combination regular diet. No acute issue on this shift, call light within reach, continue with plan of care.          Patient's most recent vital signs are:     Vital signs:  BP: 105/67  Temp: 98  HR: 112  RR: 18  SpO2: 95 %     Patient does not have new respiratory symptoms.  Patient does not have new sore throat.  Patient does not have a fever greater than 99.5.

## 2023-09-05 NOTE — PLAN OF CARE
Patient is alert and oriented x4. Able to make needs known to staff. Patient is continent of both bowel and bladder. Voids spontaneously without difficulty. Transfers with one assist w/ walker and gait belt. Patient denied Chest pain, SOB, new onset cough and N&V. Diet: Regular diet with good appetite. Double lumen PICC to RUST is intact, dressing dry, and heparinized. No reports of pain requiring PRN pain medication this shift. Call-light within reach. Continue with POC.    Vital signs: T: 97.8,  B/P: 107/66,  P: 117,  R: 18,  SpO2: 90 %     Patient does not have new respiratory symptoms.  Patient does not have new sore throat.  Patient does not have a fever greater than 99.5.

## 2023-09-05 NOTE — PROGRESS NOTES
VAHE received a call from Kaya henley from [facility] [phone] about discharge plan.  SW said pt is going home on the 8th.  She said they are going to do chemo on the 25th.  VAHE said [facility] facility will take pt if it's rough after chemo.  They will NOT take him during chemo. She wanted to know if picc line was going to be taken out.  VAHE said yes.  They will make sure pt has port put in before chemo.     BELLE Alvarez   [facility], Transitional Care Unit   Social Work   [facility]th [place] [address]  (PH) [phone]

## 2023-09-05 NOTE — PLAN OF CARE
Goal Outcome Evaluation:         Pt alert and oriented x4, able to make needs known. No c/o chest pain, SOB, N/V. Lidocaine patch removed  from left hip x2. PRN oxycodone and robaxin given 1x this shift for pain control. Pt had multiple BM in Am after giving suppository. Fentanyl patch on right upper arm intact. Pleasant and cooperative. Will continue with POC.          Patient's most recent vital signs are:     Vital signs:  BP: 105/67  Temp: 98  HR: 112  RR: 18  SpO2: 95 %     Patient does not have new respiratory symptoms.  Patient does not have new sore throat.  Patient does not have a fever greater than 99.5.

## 2023-09-06 ENCOUNTER — APPOINTMENT (OUTPATIENT)
Dept: PHYSICAL THERAPY | Facility: SKILLED NURSING FACILITY | Age: 43
End: 2023-09-06
Attending: INTERNAL MEDICINE
Payer: COMMERCIAL

## 2023-09-06 ENCOUNTER — APPOINTMENT (OUTPATIENT)
Dept: OCCUPATIONAL THERAPY | Facility: SKILLED NURSING FACILITY | Age: 43
End: 2023-09-06
Attending: INTERNAL MEDICINE
Payer: COMMERCIAL

## 2023-09-06 LAB
BASOPHILS # BLD MANUAL: 0 10E3/UL (ref 0–0.2)
BASOPHILS NFR BLD MANUAL: 0 %
BLASTS NFR BLD MANUAL: 2 %
EOSINOPHIL # BLD MANUAL: 0 10E3/UL (ref 0–0.7)
EOSINOPHIL NFR BLD MANUAL: 0 %
ERYTHROCYTE [DISTWIDTH] IN BLOOD BY AUTOMATED COUNT: 13.5 % (ref 10–15)
HCT VFR BLD AUTO: 26 % (ref 40–53)
HGB BLD-MCNC: 8.4 G/DL (ref 13.3–17.7)
LYMPHOCYTES # BLD MANUAL: 2.5 10E3/UL (ref 0.8–5.3)
LYMPHOCYTES NFR BLD MANUAL: 18 %
MCH RBC QN AUTO: 27.3 PG (ref 26.5–33)
MCHC RBC AUTO-ENTMCNC: 32.3 G/DL (ref 31.5–36.5)
MCV RBC AUTO: 84 FL (ref 78–100)
METAMYELOCYTES # BLD MANUAL: 0.7 10E3/UL
METAMYELOCYTES NFR BLD MANUAL: 5 %
MONOCYTES # BLD MANUAL: 2.3 10E3/UL (ref 0–1.3)
MONOCYTES NFR BLD MANUAL: 16 %
MYELOCYTES # BLD MANUAL: 1.6 10E3/UL
MYELOCYTES NFR BLD MANUAL: 11 %
NEUTROPHILS # BLD MANUAL: 6.8 10E3/UL (ref 1.6–8.3)
NEUTROPHILS NFR BLD MANUAL: 48 %
NRBC # BLD AUTO: 0.3 10E3/UL
NRBC BLD MANUAL-RTO: 2 %
OTHER CELLS # BLD MANUAL: 0.3 10E3/UL
PLAT MORPH BLD: ABNORMAL
PLATELET # BLD AUTO: 169 10E3/UL (ref 150–450)
RBC # BLD AUTO: 3.08 10E6/UL (ref 4.4–5.9)
RBC MORPH BLD: ABNORMAL
WBC # BLD AUTO: 14.1 10E3/UL (ref 4–11)

## 2023-09-06 PROCEDURE — 250N000011 HC RX IP 250 OP 636: Mod: JZ | Performed by: PHYSICIAN ASSISTANT

## 2023-09-06 PROCEDURE — 120N000009 HC R&B SNF

## 2023-09-06 PROCEDURE — 85007 BL SMEAR W/DIFF WBC COUNT: CPT | Performed by: PHYSICIAN ASSISTANT

## 2023-09-06 PROCEDURE — 97535 SELF CARE MNGMENT TRAINING: CPT | Mod: GO

## 2023-09-06 PROCEDURE — 250N000013 HC RX MED GY IP 250 OP 250 PS 637: Performed by: PHYSICIAN ASSISTANT

## 2023-09-06 PROCEDURE — 97530 THERAPEUTIC ACTIVITIES: CPT | Mod: GP | Performed by: PHYSICAL THERAPIST

## 2023-09-06 PROCEDURE — 85014 HEMATOCRIT: CPT | Performed by: PHYSICIAN ASSISTANT

## 2023-09-06 PROCEDURE — 36415 COLL VENOUS BLD VENIPUNCTURE: CPT | Performed by: PHYSICIAN ASSISTANT

## 2023-09-06 PROCEDURE — 250N000013 HC RX MED GY IP 250 OP 250 PS 637: Performed by: INTERNAL MEDICINE

## 2023-09-06 PROCEDURE — 250N000011 HC RX IP 250 OP 636: Performed by: INTERNAL MEDICINE

## 2023-09-06 RX ADMIN — LIDOCAINE PATCH 4% 2 PATCH: 40 PATCH TOPICAL at 08:08

## 2023-09-06 RX ADMIN — OXYCODONE HYDROCHLORIDE 15 MG: 10 TABLET ORAL at 20:28

## 2023-09-06 RX ADMIN — PANTOPRAZOLE SODIUM 40 MG: 40 TABLET, DELAYED RELEASE ORAL at 08:07

## 2023-09-06 RX ADMIN — ACETAMINOPHEN 975 MG: 325 TABLET, FILM COATED ORAL at 20:28

## 2023-09-06 RX ADMIN — HYDROXYZINE HYDROCHLORIDE 50 MG: 25 TABLET, FILM COATED ORAL at 20:28

## 2023-09-06 RX ADMIN — Medication 25 MG: at 20:28

## 2023-09-06 RX ADMIN — HEPARIN, PORCINE (PF) 10 UNIT/ML INTRAVENOUS SYRINGE 5 ML: at 08:09

## 2023-09-06 RX ADMIN — ACETAMINOPHEN 975 MG: 325 TABLET, FILM COATED ORAL at 13:41

## 2023-09-06 RX ADMIN — ACETAMINOPHEN 975 MG: 325 TABLET, FILM COATED ORAL at 08:07

## 2023-09-06 RX ADMIN — ENOXAPARIN SODIUM 40 MG: 40 INJECTION SUBCUTANEOUS at 20:28

## 2023-09-06 RX ADMIN — OXYCODONE HYDROCHLORIDE 15 MG: 10 TABLET ORAL at 08:07

## 2023-09-06 RX ADMIN — OXYCODONE HYDROCHLORIDE 15 MG: 10 TABLET ORAL at 13:41

## 2023-09-06 ASSESSMENT — ACTIVITIES OF DAILY LIVING (ADL)
ADLS_ACUITY_SCORE: 38
ADLS_ACUITY_SCORE: 38
ADLS_ACUITY_SCORE: 39
ADLS_ACUITY_SCORE: 38

## 2023-09-06 NOTE — PLAN OF CARE
Goal Outcome Evaluation:    Pt is alert and oriented x 4, can make need known. Denies pain, SOB, CP, and no n/v . Continent of bowel and bladder, urinal by bed side. Takes medication whole, assist of 1 with walker and gait belt. No acute issue on this shift. Call light within reach ,continue with plan of care.         Patient's most recent vital signs are:     Vital signs:  BP: 110/72  Temp: 97.1  HR: 99  RR: 18  SpO2: 96 %     Patient does not have new respiratory symptoms.  Patient does not have new sore throat.  Patient does not have a fever greater than 99.5.

## 2023-09-06 NOTE — PLAN OF CARE
Goal Outcome Evaluation:    VS: /72 (BP Location: Left arm)   Pulse 95   Temp 98  F (36.7  C) (Oral)   Resp 16   Wt 74.8 kg (164 lb 14.4 oz)   SpO2 97%   BMI 29.59 kg/m     O2: RA   Output: Uses urinal; staff empties  Good output   Last BM: 9/5   Activity: Therapy, resting in bed  Wife visited, PLC training on PAC   Skin: WNL  R PICC DL   Pain: Oxy x2  Fentanyl patch to RUE  Lidocaine patches to L hip    CMS:  Neuro: Intact; NT to RUE  A&O  Pt reported not sleeping well   Dressing: R PICC CDI   Diet: Reg   LDA: R PICC, + BR   Equipment: BSC, walker, GB   Plan: Con't POC.    Additional Info:      Patient's most recent vital signs are:     Vital signs:  BP: 119/72  Temp: 98  HR: 95  RR: 16  SpO2: 97 %     Patient does not have new respiratory symptoms.  Patient does not have new sore throat.  Patient does not have a fever greater than 99.5.

## 2023-09-06 NOTE — PROGRESS NOTES
SW received a call from ARABELLA GALVEZ/ [place]  - [phone] .  She could help with any discharge issues.  SW will let pt and wife know. SW updated ARABELLA about plans.    BELLE Alvarez   [facility], Transitional Care Unit   Social Work   [address] [place] [address], [address] [place] [address]  (PH) [phone]

## 2023-09-06 NOTE — PLAN OF CARE
Pt is alert and oriented x4. Able to make need known to staff. Has a doubled lumen PICC on RUE . Assist of on with walker and galt belt. Take medication whole with thin liquid. Continent of both bowel and bladder.  Denies SOB, CP and N/V. No acute issue during shift. Will continue to follow plan of care.          Patient's most recent vital signs are:     Vital signs:  BP: 110/72  Temp: 97.1  HR: 99  RR: 18  SpO2: 96 %     Patient does not have new respiratory symptoms.  Patient does not have new sore throat.  Patient does not have a fever greater than 99.5.

## 2023-09-06 NOTE — PLAN OF CARE
Physical Therapy Discharge Summary    Reason for therapy discharge:    Discharged to home with home therapy.    Progress towards therapy goal(s). See goals on Care Plan in [facility] electronic health record for goal details.  Goals met    Therapy recommendation(s):    Continued therapy is recommended.  Rationale/Recommendations:  Patient will benefit from home therapy to address safety with functional mobility with in his home including transfer to and from bathroom, shower, recliner as well as short distance ambulation and wheelchair propulsion over carpet.

## 2023-09-06 NOTE — PROGRESS NOTES
MDS Pain Assessment    The following is the pain interview as conducted by the TCU RN caring for the patient on September / 08 / 2023. This assessment is required by the [facility] for all patients in [place] SNF (Skilled Nursing Facilities).       1. Have you had pain or hurting at any time in the last 5 days? Yes    2. How much of the time have you experienced pain or hurting over the last 5 days? frequently    3. Over the past 5 days, has pain made it hard for you to sleep at night? Yes    4. Over the past 5 days, have you limited your day-to-day activities because of pain? Yes    5. Pain intensity (Numeric scale used first-if patient unable to answer, verbal scale to be used.)    Numeric Scale: Please rate your worst pain over the last 5 days on a zero to ten scale, with zero being no pain and ten being the worst pain you can imagine.     7    Verbal Scale: USED ONLY if unable to give numeric, otherwise N/A  Please rate the intensity of your worst pain over the last 5 days.     not applicable

## 2023-09-06 NOTE — CONSULTS
09/06/23 4514 Sonia Boyd, LIVIER   Patient and wife seen at bedside for PORT education. PICC is being pulled before discharge and a port is being placed , per wife. Discussed placement of port, how to prepare for placement of port and care of site and when to call care team for any concerns or problems. State they understand all information presented. Literature given: Your Implanted Port, Caring for Your Port at Home.

## 2023-09-07 ENCOUNTER — APPOINTMENT (OUTPATIENT)
Dept: PHYSICAL THERAPY | Facility: SKILLED NURSING FACILITY | Age: 43
End: 2023-09-07
Attending: INTERNAL MEDICINE
Payer: COMMERCIAL

## 2023-09-07 ENCOUNTER — APPOINTMENT (OUTPATIENT)
Dept: OCCUPATIONAL THERAPY | Facility: SKILLED NURSING FACILITY | Age: 43
End: 2023-09-07
Attending: INTERNAL MEDICINE
Payer: COMMERCIAL

## 2023-09-07 LAB
BASOPHILS # BLD MANUAL: 0 10E3/UL (ref 0–0.2)
BASOPHILS NFR BLD MANUAL: 0 %
BLASTS # BLD MANUAL: 0.2 10E3/UL
BLASTS NFR BLD MANUAL: 2 %
EOSINOPHIL # BLD MANUAL: 0 10E3/UL (ref 0–0.7)
EOSINOPHIL NFR BLD MANUAL: 0 %
ERYTHROCYTE [DISTWIDTH] IN BLOOD BY AUTOMATED COUNT: 13.3 % (ref 10–15)
HCT VFR BLD AUTO: 26.3 % (ref 40–53)
HGB BLD-MCNC: 8.7 G/DL (ref 13.3–17.7)
HOLD SPECIMEN: NORMAL
LYMPHOCYTES # BLD MANUAL: 1.3 10E3/UL (ref 0.8–5.3)
LYMPHOCYTES NFR BLD MANUAL: 11 %
MCH RBC QN AUTO: 28.2 PG (ref 26.5–33)
MCHC RBC AUTO-ENTMCNC: 33.1 G/DL (ref 31.5–36.5)
MCV RBC AUTO: 85 FL (ref 78–100)
METAMYELOCYTES # BLD MANUAL: 0.5 10E3/UL
METAMYELOCYTES NFR BLD MANUAL: 4 %
MONOCYTES # BLD MANUAL: 1.2 10E3/UL (ref 0–1.3)
MONOCYTES NFR BLD MANUAL: 10 %
MYELOCYTES # BLD MANUAL: 1.9 10E3/UL
MYELOCYTES NFR BLD MANUAL: 16 %
NEUTROPHILS # BLD MANUAL: 6.7 10E3/UL (ref 1.6–8.3)
NEUTROPHILS NFR BLD MANUAL: 57 %
NRBC # BLD AUTO: 0.2 10E3/UL
NRBC BLD MANUAL-RTO: 2 %
PLAT MORPH BLD: ABNORMAL
PLATELET # BLD AUTO: 186 10E3/UL (ref 150–450)
POLYCHROMASIA BLD QL SMEAR: SLIGHT
RBC # BLD AUTO: 3.09 10E6/UL (ref 4.4–5.9)
RBC MORPH BLD: ABNORMAL
WBC # BLD AUTO: 11.8 10E3/UL (ref 4–11)

## 2023-09-07 PROCEDURE — 250N000013 HC RX MED GY IP 250 OP 250 PS 637: Performed by: INTERNAL MEDICINE

## 2023-09-07 PROCEDURE — 250N000013 HC RX MED GY IP 250 OP 250 PS 637: Performed by: PHYSICIAN ASSISTANT

## 2023-09-07 PROCEDURE — 250N000011 HC RX IP 250 OP 636: Performed by: INTERNAL MEDICINE

## 2023-09-07 PROCEDURE — 99316 NF DSCHRG MGMT 30 MIN+: CPT | Performed by: PHYSICIAN ASSISTANT

## 2023-09-07 PROCEDURE — 85007 BL SMEAR W/DIFF WBC COUNT: CPT | Performed by: PHYSICIAN ASSISTANT

## 2023-09-07 PROCEDURE — 97535 SELF CARE MNGMENT TRAINING: CPT | Mod: GO

## 2023-09-07 PROCEDURE — 250N000011 HC RX IP 250 OP 636: Mod: JZ | Performed by: PHYSICIAN ASSISTANT

## 2023-09-07 PROCEDURE — 120N000009 HC R&B SNF

## 2023-09-07 PROCEDURE — 36415 COLL VENOUS BLD VENIPUNCTURE: CPT | Performed by: PHYSICIAN ASSISTANT

## 2023-09-07 PROCEDURE — 97530 THERAPEUTIC ACTIVITIES: CPT | Mod: GP | Performed by: PHYSICAL THERAPIST

## 2023-09-07 PROCEDURE — 85014 HEMATOCRIT: CPT | Performed by: PHYSICIAN ASSISTANT

## 2023-09-07 RX ORDER — PANTOPRAZOLE SODIUM 40 MG/1
40 TABLET, DELAYED RELEASE ORAL DAILY
Qty: 30 TABLET | Refills: 0 | Status: SHIPPED | OUTPATIENT
Start: 2023-09-08

## 2023-09-07 RX ORDER — ONDANSETRON 4 MG/1
4 TABLET, ORALLY DISINTEGRATING ORAL EVERY 6 HOURS PRN
Qty: 20 TABLET | Refills: 0 | Status: SHIPPED | OUTPATIENT
Start: 2023-09-07

## 2023-09-07 RX ORDER — ACETAMINOPHEN 325 MG/1
975 TABLET ORAL 3 TIMES DAILY
Qty: 270 TABLET | Refills: 0 | Status: SHIPPED | OUTPATIENT
Start: 2023-09-07

## 2023-09-07 RX ORDER — METHOCARBAMOL 750 MG/1
750 TABLET, FILM COATED ORAL EVERY 6 HOURS PRN
Qty: 30 TABLET | Refills: 0 | Status: SHIPPED | OUTPATIENT
Start: 2023-09-07

## 2023-09-07 RX ORDER — ENOXAPARIN SODIUM 100 MG/ML
40 INJECTION SUBCUTANEOUS EVERY 24 HOURS
Qty: 12 ML | Refills: 0 | Status: SHIPPED | OUTPATIENT
Start: 2023-09-07

## 2023-09-07 RX ORDER — SENNOSIDES 8.6 MG
1 TABLET ORAL 2 TIMES DAILY PRN
Qty: 60 TABLET | Refills: 0 | Status: SHIPPED | OUTPATIENT
Start: 2023-09-07

## 2023-09-07 RX ORDER — HYDROXYZINE HYDROCHLORIDE 25 MG/1
25-50 TABLET, FILM COATED ORAL EVERY 6 HOURS PRN
Qty: 30 TABLET | Refills: 0 | Status: SHIPPED | OUTPATIENT
Start: 2023-09-07

## 2023-09-07 RX ORDER — OXYCODONE HYDROCHLORIDE 10 MG/1
10-15 TABLET ORAL EVERY 4 HOURS PRN
Qty: 63 TABLET | Refills: 0 | Status: SHIPPED | OUTPATIENT
Start: 2023-09-07

## 2023-09-07 RX ORDER — TRAZODONE HYDROCHLORIDE 50 MG/1
25 TABLET, FILM COATED ORAL AT BEDTIME
Qty: 15 TABLET | Refills: 0 | Status: SHIPPED | OUTPATIENT
Start: 2023-09-07

## 2023-09-07 RX ORDER — FENTANYL 50 UG/1
1 PATCH TRANSDERMAL
Qty: 5 PATCH | Refills: 0 | Status: SHIPPED | OUTPATIENT
Start: 2023-09-10 | End: 2023-09-08

## 2023-09-07 RX ORDER — LIDOCAINE 4 G/G
1-2 PATCH TOPICAL
COMMUNITY
Start: 2023-09-08

## 2023-09-07 RX ADMIN — Medication 25 MG: at 20:15

## 2023-09-07 RX ADMIN — OXYCODONE HYDROCHLORIDE 10 MG: 10 TABLET ORAL at 22:08

## 2023-09-07 RX ADMIN — ACETAMINOPHEN 975 MG: 325 TABLET, FILM COATED ORAL at 20:15

## 2023-09-07 RX ADMIN — PANTOPRAZOLE SODIUM 40 MG: 40 TABLET, DELAYED RELEASE ORAL at 08:14

## 2023-09-07 RX ADMIN — ENOXAPARIN SODIUM 40 MG: 40 INJECTION SUBCUTANEOUS at 20:16

## 2023-09-07 RX ADMIN — HEPARIN, PORCINE (PF) 10 UNIT/ML INTRAVENOUS SYRINGE 10 ML: at 08:14

## 2023-09-07 RX ADMIN — HYDROXYZINE HYDROCHLORIDE 50 MG: 25 TABLET, FILM COATED ORAL at 22:08

## 2023-09-07 RX ADMIN — OXYCODONE HYDROCHLORIDE 15 MG: 10 TABLET ORAL at 14:27

## 2023-09-07 RX ADMIN — LIDOCAINE PATCH 4% 2 PATCH: 40 PATCH TOPICAL at 08:16

## 2023-09-07 RX ADMIN — ACETAMINOPHEN 975 MG: 325 TABLET, FILM COATED ORAL at 14:11

## 2023-09-07 RX ADMIN — ACETAMINOPHEN 975 MG: 325 TABLET, FILM COATED ORAL at 08:14

## 2023-09-07 RX ADMIN — OXYCODONE HYDROCHLORIDE 15 MG: 10 TABLET ORAL at 08:24

## 2023-09-07 RX ADMIN — FENTANYL 1 PATCH: 50 PATCH TRANSDERMAL at 08:18

## 2023-09-07 ASSESSMENT — ACTIVITIES OF DAILY LIVING (ADL)
ADLS_ACUITY_SCORE: 39

## 2023-09-07 NOTE — DISCHARGE SUMMARY
Occupational Therapy Discharge Summary    Reason for therapy discharge:    Pt discharging home to [place] tomorrow with wife and supportive services for chemo treatment close to home and continued rehab.     Progress towards therapy goal(s). See goals on Care Plan in [facility] electronic health record for goal details.  Goals partially met.  Barriers to achieving goals:   current medical conditions impacting/limiting progress in therapy .    Therapy recommendation(s):    Continued therapy is recommended.  Rationale/Recommendations:  pt discharging home for chemo treatment and continued rehab in [place]. Will benefit from skilled OT to address low tolerance, FB drsg w/ equipment, strengthening and I/ADL independence.  AE needs met, training provided to pt and wife.    Co-sign Siria Mcgraw OTIRASEMAL

## 2023-09-07 NOTE — PLAN OF CARE
Patient is alert and oriented x4. Able to make needs known to staff. Patient is continent of both bowel and bladder. Voids spontaneously without difficulty. Transfers with one assist. w/ walker and gait belt Patient denied Chest pain, SOB, new onset cough and N&V. Diet: Regular diet with good appetite. PICC to [facility] is intact, dressing dry, and heparinized. Pain managed with PRN oxycodone x1 which was somewhat effective. Fentanyl patch to right shoulder. Two lidocaine patches to left hip. PICC line removed per order. Call-light within reach. Continue with POC.    Vital signs: T: 98.5,  B/P: 126/80,  P: 103,  R: 16,  SpO2: 92 %     Patient does not have new respiratory symptoms.  Patient does not have new sore throat.  Patient does not have a fever greater than 99.5.

## 2023-09-07 NOTE — DISCHARGE SUMMARY
[facility]  Internal Medicine Discharge Summary    Date of Admission: 8/24/2023  Date of Discharge: 9/8/2023  Discharging Provider: Janet Garcia PA-C    Follow-ups Needed After Discharge    - Follow-up with Dr. Palma as planned   - Needs Port placement, local Oncology team to arrange   - Next chemotherapy infusion scheduled for 9/25   - Repeat post-op x-rays week of 9/25, can be done locally   - Will need follow-up with Dr. Avila of Orthopedics      Discharge Diagnoses   Physical deconditioning  Metastatic high grade chondroblastic osteosarcoma  Pathologic L femoral neck and acetabular fractures s/p CRPP of L femoral neck (8/14/23)  Pancytopenia with neutropenia  Chronic chest pain  Acute on chronic cancer-related opioid-dependent pain  Sinus tachycardia  Opioid-related constipation  Left hand blister  Moderate malnutrition  Insomnia    Reason for Admission / Rehab Course   Sarbjit Swain is a 43 year old man with a history of BPH, Greene's esophagus, and BRCA1 mutation with multiple cancers including childhood medulloblastoma s/p  shunt and whole spine radiation, rectal cancer July 2021 s/p hemicolectomy and CAPOX partial therapy (discontinued d/t intolerance; had re-anastomosis done in 12/2021), and 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 He was admitted to [facility] 8/10-8/24/23 for pathologic left femoral neck and acetabulum fractures for which he underwent closed reduction percutaneous pinning (CRPP) of left femoral neck on 8/14. Bronchoscopy done 8/17 w/ biopsy confirming metastatic osteosarcoma. Started chemotherapy 8/19. Stay c/b delirium and hyponatremia which have resolved, as well as acute on chronic pain and anemia. Transferred to TCU for physical rehabilitation.     Physical deconditioning: PT, OT followed throughout TCU stay. He was cleared for discharge home with home PT and OT services.     Metastatic high grade chondroblastic  osteosarcoma  Pathologic L femoral neck and acetabular fractures s/p CRPP of L femoral neck (8/14/23)  Recently diagnosed osteosarcoma and PET CT was concerning for mets to lungs. Admitted w/ acute pathologic fractures and underwent pinning of the femoral neck w/ Dr. Avila on 8/14 to allow for more comfort (not anticipated to heal or have reliable weight bearing) before revisiting the primary tumor after doing chemotherapy. For the lung lesions, underwent bronch on 8/17 w/ path confirming metastatic osteosarcoma. Oncology consulted and started chemotherapy 8/19-22 w/ doxorubicin/cisplatin. Last post op x-rays 8/30 were stable.    - Weight bearing: [facility] LLE and not expected to advance from there   - Access: PICC line removed 9/7, local Oncologist arranging for port placement   - DVT ppx: needs a minimum of 4 weeks of Lovenox post-op per Ortho. Patient is high risk for DVT/PE in setting of ongoing immobility and malignancy. Discharged on Lovenox 40 mg daily, will need ongoing risk/benefit discussions regarding preventive AC continuation with primary oncologist.    - Post-op x-rays due 9/25 - 10/9, can be done locally. Dr. Avila notified of patient's discharge date, anticipate further follow-up will be after more chemo   - Next chemotherapy infusion scheduled for 9/25 with local Oncology provider    Pancytopenia with neutropenia: Secondary to chemotherapy. Counts started dropping on 8.28. Received Neupogen 5 mcg/kg daily 8/31 - 9/3 for ANC <1 with cell count recovery noted on 9/4. He had severe diffuse bony pain with Neupogen noted below.     Chronic chest pain: Patient reports intermittent throbbing chest, back, and rib pain throughout the course of recent cancer dx. Chest pain worsened after Neupogen injection - EKG, troponin, CXR all unremarkable.     Acute on chronic cancer-related opioid-dependent pain: On 50 mcg Fentanyl patch and Percocet  mg (up to Q4hr PRN) PTA prescribed by Oncology. Oxycodone dose  post op is 10-15 mg Q4hr PRN. Also on Robaxin, Tylenol, hydroxyzine. Had acute worsening of generalized bony pain lasting 1-2 days after Neupogen injections, particularly of his right hip. Hip XR unrevealing without acute findings.   - Discharged with 2 week supply of Fentanyl 50 mcg patch q72h, Tylenol 975 mg TID, Robaxin 750 mg q6h PRN, hydroxyzine 25-50 mg q6h PRN, oxycodone 10-15 mg q4h PRN   - Patient will need additional pain management support with future doses of Neupogen    Sinus tachycardia: HR 100s-110s. Asymptomatic. Multifactorial secondary to pain, anxiety, deconditioning, malignancy.     Opioid-related constipation: Patient was hesitant to take laxative medications out of concern for incontinence and inconveniencing staff. Discharging with Senna and Miralax.     Left hand blister: Appears friction related, no e/o infection.     Moderate malnutrition: In context of chronic illness. RD followed.      Insomnia: Continue trazodone 25 mg at bedtime     Consultations This Stay   PHYSICAL THERAPY ADULT IP CONSULT  OCCUPATIONAL THERAPY ADULT IP CONSULT  PSYCHOLOGY ADULT IP CONSULT  NUTRITION SERVICES ADULT IP CONSULT    Physical Exam   Blood pressure 126/80, pulse 103, temperature 98.5  F (36.9  C), temperature source Oral, resp. rate 16, weight 74.8 kg (164 lb 14.4 oz), SpO2 92 %.  Constitutional: Awake and alert, in no apparent distress. Sitting up comfortably in chair.   Eyes: Sclera clear, anicteric   Respiratory: Breathing non-labored. CTAB.   Cardiovascular:  RRR, normal S1/S2. No rubs or murmurs. No peripheral edema.   GI: Soft, non-tender, non-distended. Normoactive bowel sounds.   Skin:  Good color. No jaundice. No visible rashes, lesions, or bruising of concern.   Neurologic: Alert and oriented. No focal deficits.     Significant Results and Procedures   Results for orders placed or performed during the hospital encounter of 08/24/23   XR Hip Left 2-3 Views    Narrative    EXAM: XR HIP LEFT 2-3  VIEWS  LOCATION: [facility] TRANSITION[facility]ARE  DATE: 8/30/2023    INDICATION: Follow up imaging s p CRPP of left femoral neck, acute pathologic left femoral neck fx and acetabulum fx  COMPARISON: Radiographs from 8/11/2023.      Impression    IMPRESSION:   1.  Interval leg screw placement across the left femoral neck fracture. No change in position/alignment. The superior most protrudes slightly through the subchondral bone plate of the femoral head. There is no bridging callus or bone. Radiolucency   adjacent to the fracture appears to have progressed and suggests an aggressive malignancy.   2.  Extensive destructive changes in the left superior pubic ramus and acetabulum also appear to have progressed suggesting an aggressive malignancy. Pathologic fracture with absence of most of the superior pubic ramus.   XR Pelvis w Hip Right G/E 2 Views    Narrative    EXAM: XR PELVIS AND HIP RIGHT 2 VIEWS  LOCATION: [facility] TRANSITIONAL CARE  DATE: 09/02/2023    INDICATION: acute right hip pain, history of osteosarcoma with left hip pinning. Rule out new pathologic fracture.  COMPARISON: 08/30/2023.      Impression    IMPRESSION: Postop lag screw fixation left proximal femur across the femoral neck for management of pathologic femoral neck fracture. Hardware is in unchanged position. No bridging bone across the fracture. Extensive destructive changes remain at the   left superior pubic ramus and acetabulum consistent with aggressive neoplasm such as sarcoma. Multifocal lucencies in the left proximal femur compatible with aggressive neoplasm and tumor involvement. Pathologic fracture with absence of the majority of   the left superior pubic ramus redemonstrated.    Anatomic alignment right femur. No acute displaced right femur fracture identified. Normal right hip joint space. Overall, there is little interval change compared with 08/30/2023.       XR Chest 2 Views    Narrative    Exam: XR CHEST 2 VIEWS,  9/2/2023 1:01 PM    Indication: acute on chronic chest pain, known lung mets, r/o new  pathology    Comparison: Chest radiograph 8/17/2023, CT chest 8/16/2023    Findings:   AP and lateral upright views of the abdomen were obtained. Right-sided  central venous catheter tip projects over the right atrium.  shunt  can be seen coursing down the right medial hemithorax and below the  diaphragm, coiling in the upper abdomen. Trachea is midline. Normal  cardiomediastinal and hemidiaphragmatic silhouette. Clear costophrenic  angles. Numerous bilateral pulmonary nodules are grossly stable  compared to prior chest radiograph. No pneumothorax or pleural  effusion. No focal pulmonary opacities. No acute osseous abnormality.      Impression    Impression:   1. Stable pulmonary nodules.  2. No acute pathology within the chest.     I have personally reviewed the examination and initial interpretation  and I agree with the findings.    NALINI TAYLOR DO         SYSTEM ID:  [id number]       Pending Results   None    Primary Care Physician   Dann Gaines    Discharge Disposition   Discharged to home with home therapies  Condition at discharge: Stable    Code Status   Full Code    No discharge procedures on file.     Discharge Medications   Current Discharge Medication List        CONTINUE these medications which have NOT CHANGED    Details   acetaminophen (TYLENOL) 325 MG tablet Take 2 tablets (650 mg) by mouth every 4 hours as needed for other (For optimal non-opioid multimodal pain management to improve pain control.)    Associated Diagnoses: Osteosarcoma of tibia, unspecified laterality (H); Left displaced femoral neck fracture (H)      fentaNYL (DURAGESIC) 50 mcg/hr 72 hr patch Place 1 patch onto the skin every 72 hours remove old patch.  Qty: 1 patch, Refills: 0    Associated Diagnoses: Osteosarcoma of tibia, unspecified laterality (H)      !! hydrOXYzine (ATARAX) 25 MG tablet Take 1 tablet (25 mg) by mouth every 6 hours  as needed for other or anxiety (adjuvant pain)    Associated Diagnoses: Osteosarcoma of tibia, unspecified laterality (H)      !! hydrOXYzine (ATARAX) 50 MG tablet Take 1 tablet (50 mg) by mouth every 6 hours as needed for other or anxiety (adjuvant pain)    Associated Diagnoses: Osteosarcoma of tibia, unspecified laterality (H)      loratadine (CLARITIN) 10 MG tablet Take 10 mg by mouth daily as needed for allergies      methocarbamol (ROBAXIN) 750 MG tablet Take 1 tablet (750 mg) by mouth every 6 hours as needed for muscle spasms    Associated Diagnoses: Osteosarcoma of tibia, unspecified laterality (H)      oxyCODONE IR (ROXICODONE) 5 MG tablet Take 2-3 tablets (10-15 mg) by mouth every 4 hours as needed for moderate to severe pain  Qty: 12 tablet, Refills: 0    Associated Diagnoses: Osteosarcoma of tibia, unspecified laterality (H); Left displaced femoral neck fracture (H)      pantoprazole (PROTONIX) 40 MG EC tablet Take 1 tablet (40 mg) by mouth daily    Associated Diagnoses: Gastroesophageal reflux disease without esophagitis      polyethylene glycol (MIRALAX) 17 g packet Take 17 g by mouth daily    Associated Diagnoses: Osteosarcoma of tibia, unspecified laterality (H)      psyllium (METAMUCIL) WAFR Take 2 Wafers by mouth daily    Associated Diagnoses: Osteosarcoma of tibia, unspecified laterality (H)      senna-docusate (SENOKOT-S/PERICOLACE) 8.6-50 MG tablet Take 1 tablet by mouth 2 times daily    Associated Diagnoses: Osteosarcoma of tibia, unspecified laterality (H)      traZODone (DESYREL) 50 MG tablet Take 0.5 tablets (25 mg) by mouth At Bedtime    Associated Diagnoses: Primary insomnia       !! - Potential duplicate medications found. Please discuss with provider.          Patient seen and examined on 9/7/2023 in anticipation of discharge on 9/8/23.    IJanet PA-C, personally saw the patient today and spent greater than 30 minutes discharging this patient.    Janet Garcia,  LUIS  Hospitalist Service  Pager: [phone]

## 2023-09-07 NOTE — PLAN OF CARE
Physical Therapy Discharge Summary     Reason for therapy discharge:    Discharged to home with home therapy.     Progress towards therapy goal(s). See goals on Care Plan in [facility] electronic health record for goal details.  Goals met     Therapy recommendation(s):    Continued therapy is recommended.  Rationale/Recommendations:  Patient will benefit from home therapy to address safety with functional mobility with in his home including transfer to and from bathroom, shower, recliner as well as short distance ambulation and wheelchair propulsion over carpet.

## 2023-09-07 NOTE — PROGRESS NOTES
Discharge Plan     Discharge Date: 9/8/23  Discharge Disposition:  Home.     Discharge Services:  [facility] PT/OT/RN      ORDERS AND DISCHARGE SUMMARY WILL NEED TO BE FAXED TO      Discharge Supplies: All DME supplied by PT/OT     Discharge Transportation: Wife     Lucy Elmer, BELLE   [facility], Transitional Care Unit   Social Work   [facility], [address] [place] [address]  () [phone]

## 2023-09-07 NOTE — PLAN OF CARE
Pt is alert and oriented x4. Able to make need known to staff. Has a doubled lumen PICC on RUE . Assist of on with walker and galt belt. Take medication whole with thin liquid. Continent of both bowel and bladder.  Denies SOB, CP and N/V.  PRN Atarax 50 mg and oxycodone 15 mg was given. No acute issue during shift. Will continue to follow plan of care.             Patient's most recent vital signs are:     Vital signs:  BP: 117/72  Temp: 97.4  HR: 74  RR: 18  SpO2: 95 %     Patient does not have new respiratory symptoms.  Patient does not have new sore throat.  Patient does not have a fever greater than 99.5.

## 2023-09-07 NOTE — CONSULTS
PLC does not teach injections in pre-filled syringes. Bedside RN to teach. PLC consult discontinued.

## 2023-09-07 NOTE — PLAN OF CARE
Goal Outcome Evaluation:  No acute issues overnight. Appears sleeping well as noted during rounds. Has no c/o's pain, discomfort, CP/SOB or any other c/o's during shift. Continent using urinal indep.- staff empties. Planning to discharge home tomorrow.        Patient's most recent vital signs are:     Vital signs:  BP: 117/72  Temp: 97.4  HR: 74  RR: 18  SpO2: 95 %     Patient does not have new respiratory symptoms.  Patient does not have new sore throat.  Patient does not have a fever greater than 99.5.

## 2023-09-08 VITALS
RESPIRATION RATE: 16 BRPM | HEART RATE: 99 BPM | DIASTOLIC BLOOD PRESSURE: 78 MMHG | BODY MASS INDEX: 29.59 KG/M2 | OXYGEN SATURATION: 94 % | SYSTOLIC BLOOD PRESSURE: 128 MMHG | TEMPERATURE: 98.1 F | WEIGHT: 164.9 LBS

## 2023-09-08 LAB
BASOPHILS # BLD MANUAL: 0 10E3/UL (ref 0–0.2)
BASOPHILS NFR BLD MANUAL: 0 %
CREAT SERPL-MCNC: 0.86 MG/DL (ref 0.67–1.17)
DACRYOCYTES BLD QL SMEAR: SLIGHT
EGFRCR SERPLBLD CKD-EPI 2021: >90 ML/MIN/1.73M2
EOSINOPHIL # BLD MANUAL: 0 10E3/UL (ref 0–0.7)
EOSINOPHIL NFR BLD MANUAL: 0 %
ERYTHROCYTE [DISTWIDTH] IN BLOOD BY AUTOMATED COUNT: 13.4 % (ref 10–15)
FRAGMENTS BLD QL SMEAR: SLIGHT
HCT VFR BLD AUTO: 25.4 % (ref 40–53)
HGB BLD-MCNC: 8.6 G/DL (ref 13.3–17.7)
LYMPHOCYTES # BLD MANUAL: 2 10E3/UL (ref 0.8–5.3)
LYMPHOCYTES NFR BLD MANUAL: 19 %
MCH RBC QN AUTO: 28.7 PG (ref 26.5–33)
MCHC RBC AUTO-ENTMCNC: 33.9 G/DL (ref 31.5–36.5)
MCV RBC AUTO: 85 FL (ref 78–100)
MONOCYTES # BLD MANUAL: 2.1 10E3/UL (ref 0–1.3)
MONOCYTES NFR BLD MANUAL: 20 %
NEUTROPHILS # BLD MANUAL: 6.4 10E3/UL (ref 1.6–8.3)
NEUTROPHILS NFR BLD MANUAL: 61 %
NRBC # BLD AUTO: 0.2 10E3/UL
NRBC BLD MANUAL-RTO: 2 %
PLAT MORPH BLD: ABNORMAL
PLATELET # BLD AUTO: 180 10E3/UL (ref 150–450)
POLYCHROMASIA BLD QL SMEAR: SLIGHT
RBC # BLD AUTO: 3 10E6/UL (ref 4.4–5.9)
RBC MORPH BLD: ABNORMAL
WBC # BLD AUTO: 10.5 10E3/UL (ref 4–11)

## 2023-09-08 PROCEDURE — 250N000013 HC RX MED GY IP 250 OP 250 PS 637: Performed by: PHYSICIAN ASSISTANT

## 2023-09-08 PROCEDURE — 82565 ASSAY OF CREATININE: CPT | Performed by: PHYSICIAN ASSISTANT

## 2023-09-08 PROCEDURE — 85007 BL SMEAR W/DIFF WBC COUNT: CPT | Performed by: PHYSICIAN ASSISTANT

## 2023-09-08 PROCEDURE — 36415 COLL VENOUS BLD VENIPUNCTURE: CPT | Performed by: PHYSICIAN ASSISTANT

## 2023-09-08 PROCEDURE — 85027 COMPLETE CBC AUTOMATED: CPT | Performed by: PHYSICIAN ASSISTANT

## 2023-09-08 PROCEDURE — 250N000013 HC RX MED GY IP 250 OP 250 PS 637: Performed by: INTERNAL MEDICINE

## 2023-09-08 RX ORDER — FENTANYL 50 UG/1
1 PATCH TRANSDERMAL
Qty: 5 PATCH | Refills: 0 | Status: SHIPPED | OUTPATIENT
Start: 2023-09-10

## 2023-09-08 RX ADMIN — LIDOCAINE PATCH 4% 2 PATCH: 40 PATCH TOPICAL at 08:13

## 2023-09-08 RX ADMIN — PANTOPRAZOLE SODIUM 40 MG: 40 TABLET, DELAYED RELEASE ORAL at 08:13

## 2023-09-08 RX ADMIN — OXYCODONE HYDROCHLORIDE 15 MG: 10 TABLET ORAL at 10:31

## 2023-09-08 RX ADMIN — ACETAMINOPHEN 975 MG: 325 TABLET, FILM COATED ORAL at 08:13

## 2023-09-08 ASSESSMENT — ACTIVITIES OF DAILY LIVING (ADL)
ADLS_ACUITY_SCORE: 39

## 2023-09-08 NOTE — PROGRESS NOTES
[facility] called [facility].  They need paperwork faxed.  [phone].  [facility] sent it.     BELLE Alvarez   [facility], Transitional Care Unit   Social Work   [address] [place] [address], [address] [place] [address]  (PH) [phone]

## 2023-09-08 NOTE — PLAN OF CARE
Goal Outcome Evaluation:      A&Ox4. Denies CP, SOB, N&V, and chill or fever. A 1 with walker and gait belt. Take med whole with thin liquid. Continent to B&B. PRN Oxy and Atarax adm 22:08 pm this shift for pain omayra. Fentany patch right shoulder CDI. Lidocaine patch to the hip removed. Eats regular diet with good appetite. Report no pain this shift. Able to make needs known. Urinal at bed side. Call light within reach. Continue to monitor POC.           Patient's most recent vital signs are:     Vital signs:  BP: 118/79  Temp: 98.1  HR: 100  RR: 18  SpO2: 95 %     Patient does not have new respiratory symptoms.  Patient does not have new sore throat.  Patient does not have a fever greater than 99.5.

## 2023-09-08 NOTE — PLAN OF CARE
Goal Outcome Evaluation:  Pt.A&Ox4. Awake initial rounds - talked a bit about discharge home this AM, looking forward to it but also a little nervous about how things will be moving forward. Therapeutic listening, reassurance and encouragement provided. Had no c/o's or requests @ that time. Used urinal indep.- staff emptied. Reminded to call if he needs pain meds. Subsequent rounds - appears sleeping well.        Patient's most recent vital signs are:     Vital signs:  BP: 118/79  Temp: 98.1  HR: 100  RR: 18  SpO2: 95 %     Patient does not have new respiratory symptoms.  Patient does not have new sore throat.  Patient does not have a fever greater than 99.5.

## 2023-09-08 NOTE — PLAN OF CARE
Patient is alert and oriented x4. Able to make needs known to staff. Patient is continent of both bowel and bladder. Voids spontaneously without difficulty. Transfers with one assist. w/ walker and gait belt. Patient denied Chest pain, SOB, new onset cough and N&V. Diet: Regular diet with good appetite. Two Lidocaine patches to left hip. Fentanyl pacth in pace on right shoulder. Pain managed with PRN oxycodone before discharge.  Reviewed discharge orders and After Visit Summary (AVS) with the patient. Medication orders were reviewed and instructions given as to the frequency to take each med, along with when last medication was given. Patient belongings sent with patient. Patient instructed to follow up with primary physican with any further questions they may have. Patient states they understand discharge orders as they are written and has no questions. Patient was discharged at 1033.     Vital signs: T: 98.1,  B/P: 128/78,  P: 99,  R: 16,  SpO2: 94 %     Patient does not have new respiratory symptoms.  Patient does not have new sore throat.  Patient does not have a fever greater than 99.5.

## 2023-09-28 LAB
BASOPHILS # BLD MANUAL: 0 10E3/UL (ref 0–0.2)
BASOPHILS NFR BLD MANUAL: 0 %
EOSINOPHIL # BLD MANUAL: 0.1 10E3/UL (ref 0–0.7)
EOSINOPHIL NFR BLD MANUAL: 1 %
LYMPHOCYTES # BLD MANUAL: 2.1 10E3/UL (ref 0.8–5.3)
LYMPHOCYTES NFR BLD MANUAL: 23 %
METAMYELOCYTES # BLD MANUAL: 0.4 10E3/UL
METAMYELOCYTES NFR BLD MANUAL: 4 %
MONOCYTES # BLD MANUAL: 0.5 10E3/UL (ref 0–1.3)
MONOCYTES NFR BLD MANUAL: 6 %
MYELOCYTES # BLD MANUAL: 0.2 10E3/UL
MYELOCYTES NFR BLD MANUAL: 2 %
NEUTROPHILS # BLD MANUAL: 3.8 10E3/UL (ref 1.6–8.3)
NEUTROPHILS NFR BLD MANUAL: 42 %
NRBC # BLD AUTO: 0.4 10E3/UL
NRBC BLD MANUAL-RTO: 4 %
OTHER CELLS # BLD MANUAL: 2 10E3/UL
OTHER CELLS NFR BLD MANUAL: 22 %
PATH REV: ABNORMAL
PLAT MORPH BLD: ABNORMAL
RBC MORPH BLD: ABNORMAL

## 2023-10-02 NOTE — PROGRESS NOTES
Brief Medicine Note    CBC with diff results from 9/4 were routed to me, showing 6% other cells being blasts. Patient was receiving Neupogen at the time and likely the reason. Called patient's outpatient oncology clinic Dr. Huynh and spoke with RN in clinic to inform them of this result. They state they will follow up with patient, with plans to see him in 2 weeks.     Tatiana Grissom [facility] Medicine

## 2023-11-16 NOTE — ADDENDUM NOTE
Addendum  created 11/16/23 1620 by Perry Gonzalez MD    Attestation recorded in Intraprocedure, Clinical Note Signed, Intraprocedure Attestations filed, SmartForm saved

## 2024-04-17 NOTE — H&P
[facility] Medicine Attestation     I have reviewed the H&P dated 8/26/23 by Dr. Rebecca Killian.    Tatiana Grissom PA-C  Hospitalist Service  Contact information available via [facility] Paging/Directory     LVM letting her know script has been called in. And if she needs a podiatrist referral to call or leave a message.

## (undated) DEVICE — DRAPE IOBAN ISOLATION VERTICAL 6619

## (undated) DEVICE — SYR 30ML SLIP TIP W/O NDL 302833

## (undated) DEVICE — CONNECTOR SWIVEL 7.0MM ION 490108

## (undated) DEVICE — SU VICRYL 2-0 CT-1 27" UND J259H

## (undated) DEVICE — LINEN BACK PACK 5440

## (undated) DEVICE — TUBING SUCTION 10'X3/16" N510

## (undated) DEVICE — TRAY PREP DRY SKIN SCRUB 067

## (undated) DEVICE — ADAPTER PROBE/SUCTION VISION ION 490101

## (undated) DEVICE — PREP CHLORAPREP 26ML TINTED HI-LITE ORANGE 930815

## (undated) DEVICE — Device

## (undated) DEVICE — GLOVE BIOGEL PI ULTRATOUCH G SZ 7.0 42170

## (undated) DEVICE — DRAPE IOBAN INCISE 23X17" 6650EZ

## (undated) DEVICE — KIT PATIENT CARE HANA PROFX W/BOOT LINER SUPINE 6851

## (undated) DEVICE — BLADE CLIPPER SGL USE 9680

## (undated) DEVICE — GOWN XLG DISP 9545

## (undated) DEVICE — KIT ENDO FIRST STEP DISINFECTANT 200ML W/POUCH EP-4

## (undated) DEVICE — PITCHER STERILE 1000ML  SSK9004A

## (undated) DEVICE — PREP POVIDONE-IODINE 7.5% SCRUB 4OZ BOTTLE MDS093945

## (undated) DEVICE — ENDO VALVE BX EVIS MAJ-210

## (undated) DEVICE — LUBRICANT INST KIT ENDO-LUBE 220-90

## (undated) DEVICE — LABEL MEDICATION SYSTEM 3303-P

## (undated) DEVICE — SOL WATER IRRIG 1000ML BOTTLE 2F7114

## (undated) DEVICE — PACK TOTAL HIP W/POUCH RIVERSIDE LATEX FREE

## (undated) DEVICE — ESU PENCIL W/SMOKE EVAC NEPTUNE STRYKER 0703-046-000

## (undated) DEVICE — BAG INSTRUMENT IV VISION ION 490127

## (undated) DEVICE — SUCTION MANIFOLD NEPTUNE 2 SYS 4 PORT 0702-020-000

## (undated) DEVICE — SU MONOCRYL 4-0 PS-2 18" UND Y496G

## (undated) DEVICE — DRSG STERI STRIP 1/2X4" R1547

## (undated) DEVICE — LINEN TOWEL PACK X5 5464

## (undated) DEVICE — ENDO VALVE SUCTION BRONCH EVIS MAJ-209

## (undated) DEVICE — DRILL TWIST CANNULATED 6.5MM

## (undated) DEVICE — SU VICRYL 0 CT-1 27" J340H

## (undated) DEVICE — NDL ASPIRATION EBUS-VIZISHOT 22G NA-U401SX-4022-A

## (undated) DEVICE — ESU GROUND PAD UNIVERSAL W/O CORD

## (undated) DEVICE — SOL NACL 0.9% IRRIG 1000ML BOTTLE 2F7124

## (undated) RX ORDER — FENTANYL CITRATE 50 UG/ML
INJECTION, SOLUTION INTRAMUSCULAR; INTRAVENOUS
Status: DISPENSED
Start: 2023-08-14

## (undated) RX ORDER — FENTANYL CITRATE 50 UG/ML
INJECTION, SOLUTION INTRAMUSCULAR; INTRAVENOUS
Status: DISPENSED
Start: 2023-08-17

## (undated) RX ORDER — ONDANSETRON 2 MG/ML
INJECTION INTRAMUSCULAR; INTRAVENOUS
Status: DISPENSED
Start: 2023-08-14

## (undated) RX ORDER — HYDROMORPHONE HYDROCHLORIDE 1 MG/ML
INJECTION, SOLUTION INTRAMUSCULAR; INTRAVENOUS; SUBCUTANEOUS
Status: DISPENSED
Start: 2023-08-14

## (undated) RX ORDER — HYDROMORPHONE HCL IN WATER/PF 6 MG/30 ML
PATIENT CONTROLLED ANALGESIA SYRINGE INTRAVENOUS
Status: DISPENSED
Start: 2023-08-17

## (undated) RX ORDER — DEXAMETHASONE SODIUM PHOSPHATE 4 MG/ML
INJECTION, SOLUTION INTRA-ARTICULAR; INTRALESIONAL; INTRAMUSCULAR; INTRAVENOUS; SOFT TISSUE
Status: DISPENSED
Start: 2023-08-14

## (undated) RX ORDER — OXYCODONE HYDROCHLORIDE 5 MG/1
TABLET ORAL
Status: DISPENSED
Start: 2023-08-14

## (undated) RX ORDER — PROPOFOL 10 MG/ML
INJECTION, EMULSION INTRAVENOUS
Status: DISPENSED
Start: 2023-08-14

## (undated) RX ORDER — LABETALOL HYDROCHLORIDE 5 MG/ML
INJECTION, SOLUTION INTRAVENOUS
Status: DISPENSED
Start: 2023-08-14